# Patient Record
Sex: MALE | Race: WHITE | NOT HISPANIC OR LATINO
[De-identification: names, ages, dates, MRNs, and addresses within clinical notes are randomized per-mention and may not be internally consistent; named-entity substitution may affect disease eponyms.]

---

## 2017-07-13 ENCOUNTER — APPOINTMENT (OUTPATIENT)
Dept: UROLOGY | Facility: CLINIC | Age: 62
End: 2017-07-13

## 2017-07-13 DIAGNOSIS — Z87.09 PERSONAL HISTORY OF OTHER DISEASES OF THE RESPIRATORY SYSTEM: ICD-10-CM

## 2017-07-13 DIAGNOSIS — Z87.19 PERSONAL HISTORY OF OTHER DISEASES OF THE DIGESTIVE SYSTEM: ICD-10-CM

## 2017-07-13 DIAGNOSIS — R97.20 ELEVATED PROSTATE, SPECIFIC ANTIGEN [PSA]: ICD-10-CM

## 2017-08-02 ENCOUNTER — FORM ENCOUNTER (OUTPATIENT)
Age: 62
End: 2017-08-02

## 2017-08-03 ENCOUNTER — OUTPATIENT (OUTPATIENT)
Dept: OUTPATIENT SERVICES | Facility: HOSPITAL | Age: 62
LOS: 1 days | End: 2017-08-03
Payer: COMMERCIAL

## 2017-08-03 ENCOUNTER — APPOINTMENT (OUTPATIENT)
Dept: MRI IMAGING | Facility: IMAGING CENTER | Age: 62
End: 2017-08-03
Payer: COMMERCIAL

## 2017-08-03 DIAGNOSIS — R97.20 ELEVATED PROSTATE SPECIFIC ANTIGEN [PSA]: ICD-10-CM

## 2017-08-03 PROCEDURE — 72197 MRI PELVIS W/O & W/DYE: CPT

## 2017-08-03 PROCEDURE — 82565 ASSAY OF CREATININE: CPT

## 2017-08-03 PROCEDURE — A9585: CPT

## 2017-08-03 PROCEDURE — 72197 MRI PELVIS W/O & W/DYE: CPT | Mod: 26

## 2017-08-15 ENCOUNTER — APPOINTMENT (OUTPATIENT)
Dept: UROLOGY | Facility: CLINIC | Age: 62
End: 2017-08-15
Payer: COMMERCIAL

## 2017-08-15 VITALS — SYSTOLIC BLOOD PRESSURE: 124 MMHG | HEART RATE: 82 BPM | DIASTOLIC BLOOD PRESSURE: 86 MMHG | TEMPERATURE: 98.7 F

## 2017-08-15 PROCEDURE — 99214 OFFICE O/P EST MOD 30 MIN: CPT

## 2017-08-18 ENCOUNTER — TRANSCRIPTION ENCOUNTER (OUTPATIENT)
Age: 62
End: 2017-08-18

## 2017-08-20 LAB — PSA SERPL-MCNC: 30.84 NG/ML

## 2017-09-05 ENCOUNTER — APPOINTMENT (OUTPATIENT)
Dept: UROLOGY | Facility: CLINIC | Age: 62
End: 2017-09-05
Payer: COMMERCIAL

## 2017-09-05 ENCOUNTER — RECORD ABSTRACTING (OUTPATIENT)
Age: 62
End: 2017-09-05

## 2017-09-05 ENCOUNTER — OUTPATIENT (OUTPATIENT)
Dept: OUTPATIENT SERVICES | Facility: HOSPITAL | Age: 62
LOS: 1 days | End: 2017-09-05
Payer: COMMERCIAL

## 2017-09-05 VITALS
TEMPERATURE: 98.4 F | HEART RATE: 67 BPM | SYSTOLIC BLOOD PRESSURE: 128 MMHG | RESPIRATION RATE: 16 BRPM | DIASTOLIC BLOOD PRESSURE: 78 MMHG

## 2017-09-05 DIAGNOSIS — R35.0 FREQUENCY OF MICTURITION: ICD-10-CM

## 2017-09-05 PROCEDURE — 76942 ECHO GUIDE FOR BIOPSY: CPT | Mod: 26,59

## 2017-09-05 PROCEDURE — 76872 US TRANSRECTAL: CPT

## 2017-09-05 PROCEDURE — 55700: CPT

## 2017-09-05 PROCEDURE — 76872 US TRANSRECTAL: CPT | Mod: 26

## 2017-09-05 PROCEDURE — 76942 ECHO GUIDE FOR BIOPSY: CPT | Mod: 59

## 2017-09-05 RX ORDER — AMIKACIN SULFATE 250 MG/ML
500 INJECTION, SOLUTION INTRAMUSCULAR; INTRAVENOUS
Refills: 0 | Status: COMPLETED | OUTPATIENT
Start: 2017-09-05

## 2017-09-05 RX ORDER — AMIKACIN SULFATE 250 MG/ML
500 INJECTION, SOLUTION INTRAMUSCULAR; INTRAVENOUS
Qty: 1 | Refills: 0 | Status: COMPLETED | OUTPATIENT
Start: 2017-09-05 | End: 2017-09-05

## 2017-09-05 RX ADMIN — AMIKACIN SULFATE 2 MG/2ML: 250 INJECTION, SOLUTION INTRAMUSCULAR; INTRAVENOUS at 00:00

## 2017-09-08 LAB — CORE LAB BIOPSY: NORMAL

## 2017-09-11 DIAGNOSIS — R97.20 ELEVATED PROSTATE SPECIFIC ANTIGEN [PSA]: ICD-10-CM

## 2017-09-19 ENCOUNTER — APPOINTMENT (OUTPATIENT)
Dept: UROLOGY | Facility: CLINIC | Age: 62
End: 2017-09-19

## 2017-09-26 ENCOUNTER — APPOINTMENT (OUTPATIENT)
Dept: UROLOGY | Facility: CLINIC | Age: 62
End: 2017-09-26
Payer: COMMERCIAL

## 2017-09-26 PROCEDURE — 99214 OFFICE O/P EST MOD 30 MIN: CPT

## 2017-10-03 ENCOUNTER — FORM ENCOUNTER (OUTPATIENT)
Age: 62
End: 2017-10-03

## 2017-10-04 ENCOUNTER — APPOINTMENT (OUTPATIENT)
Dept: NUCLEAR MEDICINE | Facility: IMAGING CENTER | Age: 62
End: 2017-10-04
Payer: COMMERCIAL

## 2017-10-04 ENCOUNTER — OUTPATIENT (OUTPATIENT)
Dept: OUTPATIENT SERVICES | Facility: HOSPITAL | Age: 62
LOS: 1 days | End: 2017-10-04
Payer: COMMERCIAL

## 2017-10-04 ENCOUNTER — APPOINTMENT (OUTPATIENT)
Dept: CT IMAGING | Facility: IMAGING CENTER | Age: 62
End: 2017-10-04
Payer: COMMERCIAL

## 2017-10-04 DIAGNOSIS — C61 MALIGNANT NEOPLASM OF PROSTATE: ICD-10-CM

## 2017-10-04 PROCEDURE — 78306 BONE IMAGING WHOLE BODY: CPT | Mod: 26

## 2017-10-04 PROCEDURE — 78320: CPT | Mod: 26

## 2017-10-04 PROCEDURE — 74177 CT ABD & PELVIS W/CONTRAST: CPT

## 2017-10-04 PROCEDURE — 82565 ASSAY OF CREATININE: CPT

## 2017-10-04 PROCEDURE — 78999 UNLISTED MISC PX DX NUC MED: CPT

## 2017-10-04 PROCEDURE — 74177 CT ABD & PELVIS W/CONTRAST: CPT | Mod: 26

## 2017-10-04 PROCEDURE — A9561: CPT

## 2017-10-04 PROCEDURE — 78306 BONE IMAGING WHOLE BODY: CPT

## 2017-10-11 ENCOUNTER — RECORD ABSTRACTING (OUTPATIENT)
Age: 62
End: 2017-10-11

## 2017-10-19 ENCOUNTER — OUTPATIENT (OUTPATIENT)
Dept: OUTPATIENT SERVICES | Facility: HOSPITAL | Age: 62
LOS: 1 days | End: 2017-10-19
Payer: COMMERCIAL

## 2017-10-19 VITALS
DIASTOLIC BLOOD PRESSURE: 98 MMHG | HEART RATE: 88 BPM | SYSTOLIC BLOOD PRESSURE: 134 MMHG | HEIGHT: 71 IN | TEMPERATURE: 98 F | WEIGHT: 197.09 LBS | RESPIRATION RATE: 18 BRPM

## 2017-10-19 DIAGNOSIS — H91.90 UNSPECIFIED HEARING LOSS, UNSPECIFIED EAR: ICD-10-CM

## 2017-10-19 DIAGNOSIS — K40.90 UNILATERAL INGUINAL HERNIA, WITHOUT OBSTRUCTION OR GANGRENE, NOT SPECIFIED AS RECURRENT: Chronic | ICD-10-CM

## 2017-10-19 DIAGNOSIS — C61 MALIGNANT NEOPLASM OF PROSTATE: ICD-10-CM

## 2017-10-19 DIAGNOSIS — K21.9 GASTRO-ESOPHAGEAL REFLUX DISEASE WITHOUT ESOPHAGITIS: ICD-10-CM

## 2017-10-19 DIAGNOSIS — I10 ESSENTIAL (PRIMARY) HYPERTENSION: ICD-10-CM

## 2017-10-19 DIAGNOSIS — G47.33 OBSTRUCTIVE SLEEP APNEA (ADULT) (PEDIATRIC): ICD-10-CM

## 2017-10-19 LAB
ALBUMIN SERPL ELPH-MCNC: 4.3 G/DL — SIGNIFICANT CHANGE UP (ref 3.3–5)
ALP SERPL-CCNC: 60 U/L — SIGNIFICANT CHANGE UP (ref 40–120)
ALT FLD-CCNC: 19 U/L — SIGNIFICANT CHANGE UP (ref 4–41)
APPEARANCE UR: CLEAR — SIGNIFICANT CHANGE UP
AST SERPL-CCNC: 21 U/L — SIGNIFICANT CHANGE UP (ref 4–40)
BILIRUB SERPL-MCNC: 0.3 MG/DL — SIGNIFICANT CHANGE UP (ref 0.2–1.2)
BILIRUB UR-MCNC: NEGATIVE — SIGNIFICANT CHANGE UP
BLOOD UR QL VISUAL: NEGATIVE — SIGNIFICANT CHANGE UP
BUN SERPL-MCNC: 20 MG/DL — SIGNIFICANT CHANGE UP (ref 7–23)
CALCIUM SERPL-MCNC: 9.4 MG/DL — SIGNIFICANT CHANGE UP (ref 8.4–10.5)
CHLORIDE SERPL-SCNC: 101 MMOL/L — SIGNIFICANT CHANGE UP (ref 98–107)
CO2 SERPL-SCNC: 28 MMOL/L — SIGNIFICANT CHANGE UP (ref 22–31)
COLOR SPEC: SIGNIFICANT CHANGE UP
CREAT SERPL-MCNC: 1.32 MG/DL — HIGH (ref 0.5–1.3)
GLUCOSE SERPL-MCNC: 76 MG/DL — SIGNIFICANT CHANGE UP (ref 70–99)
GLUCOSE UR-MCNC: NEGATIVE — SIGNIFICANT CHANGE UP
HCT VFR BLD CALC: 45.8 % — SIGNIFICANT CHANGE UP (ref 39–50)
HGB BLD-MCNC: 14.4 G/DL — SIGNIFICANT CHANGE UP (ref 13–17)
KETONES UR-MCNC: NEGATIVE — SIGNIFICANT CHANGE UP
LEUKOCYTE ESTERASE UR-ACNC: NEGATIVE — SIGNIFICANT CHANGE UP
MCHC RBC-ENTMCNC: 30.3 PG — SIGNIFICANT CHANGE UP (ref 27–34)
MCHC RBC-ENTMCNC: 31.4 % — LOW (ref 32–36)
MCV RBC AUTO: 96.2 FL — SIGNIFICANT CHANGE UP (ref 80–100)
MUCOUS THREADS # UR AUTO: SIGNIFICANT CHANGE UP
NITRITE UR-MCNC: NEGATIVE — SIGNIFICANT CHANGE UP
NRBC # FLD: 0 — SIGNIFICANT CHANGE UP
PH UR: 6 — SIGNIFICANT CHANGE UP (ref 4.6–8)
PLATELET # BLD AUTO: 204 K/UL — SIGNIFICANT CHANGE UP (ref 150–400)
PMV BLD: 12.2 FL — SIGNIFICANT CHANGE UP (ref 7–13)
POTASSIUM SERPL-MCNC: 4 MMOL/L — SIGNIFICANT CHANGE UP (ref 3.5–5.3)
POTASSIUM SERPL-SCNC: 4 MMOL/L — SIGNIFICANT CHANGE UP (ref 3.5–5.3)
PROT SERPL-MCNC: 7.5 G/DL — SIGNIFICANT CHANGE UP (ref 6–8.3)
PROT UR-MCNC: 20 — SIGNIFICANT CHANGE UP
RBC # BLD: 4.76 M/UL — SIGNIFICANT CHANGE UP (ref 4.2–5.8)
RBC # FLD: 12.4 % — SIGNIFICANT CHANGE UP (ref 10.3–14.5)
SODIUM SERPL-SCNC: 144 MMOL/L — SIGNIFICANT CHANGE UP (ref 135–145)
SP GR SPEC: 1.01 — SIGNIFICANT CHANGE UP (ref 1–1.03)
UROBILINOGEN FLD QL: NORMAL E.U. — SIGNIFICANT CHANGE UP (ref 0.1–0.2)
WBC # BLD: 5.1 K/UL — SIGNIFICANT CHANGE UP (ref 3.8–10.5)
WBC # FLD AUTO: 5.1 K/UL — SIGNIFICANT CHANGE UP (ref 3.8–10.5)
WBC UR QL: SIGNIFICANT CHANGE UP (ref 0–?)

## 2017-10-19 PROCEDURE — 93010 ELECTROCARDIOGRAM REPORT: CPT

## 2017-10-19 RX ORDER — SODIUM CHLORIDE 9 MG/ML
1000 INJECTION, SOLUTION INTRAVENOUS
Refills: 0 | Status: DISCONTINUED | OUTPATIENT
Start: 2017-10-30 | End: 2017-10-30

## 2017-10-19 NOTE — H&P PST ADULT - NEGATIVE ALLERGY TYPES
no reactions to food/no outdoor environmental allergies/no indoor environmental allergies/no reactions to medicines

## 2017-10-19 NOTE — H&P PST ADULT - NEGATIVE ENMT SYMPTOMS
no nasal congestion/no nasal obstruction/no post-nasal discharge/no tinnitus/no nasal discharge/no vertigo/no sinus symptoms/no ear pain/no throat pain/no dysphagia

## 2017-10-19 NOTE — H&P PST ADULT - FAMILY HISTORY
Mother  Still living? No  Family history of breast cancer, Age at diagnosis: Age Unknown     Father  Still living? No  Family history of stroke, Age at diagnosis: Age Unknown

## 2017-10-19 NOTE — H&P PST ADULT - ENMT COMMENTS
had an ear infection, treated with wrong med prefers use of , deaf right ear, 50% left ear - pt states, from using wrong medication for ear infection when he was a baby

## 2017-10-19 NOTE — H&P PST ADULT - PMH
Gastritis    GERD (gastroesophageal reflux disease)    Hyperlipidemia    Hypertension Gastritis    GERD (gastroesophageal reflux disease)    Hearing loss  deaf right ear, 50 % loss left ear  Hyperlipidemia    Hypertension

## 2017-10-19 NOTE — H&P PST ADULT - PROBLEM SELECTOR PLAN 1
Scheduled for robotic radical prostatectomy on 10/30/2017. labs done and results pending. Surgical scrub & preop instruction given and explained. Famotidine provided with instruction. Verbalized understanding.

## 2017-10-19 NOTE — H&P PST ADULT - HISTORY OF PRESENT ILLNESS
61 yo male with hx of HTN, HLD, GERD presents to have PST evaluation for robotic radical prostatectomy on 10/30/2017. Patient states, elevated PSA 61 yo male with hx of HTN, HLD, GERD presents to have PST evaluation for robotic radical prostatectomy on 10/30/2017. Patient c/o slowly rising PSA level over 7 years, with 4 negative biopsies, monitored by urologist, recent bx (9/2017) showed "malignancy", surgical intervention was recommended.    Patient is accompanied by .

## 2017-10-19 NOTE — H&P PST ADULT - ABILITY TO HEAR (WITH HEARING AID OR HEARING APPLIANCE IF NORMALLY USED):
Mildly to Moderately Impaired: difficulty hearing in some environments or speaker may need to increase volume or speak distinctly/prefers use of , deaf right ear, 50% left ear

## 2017-10-19 NOTE — H&P PST ADULT - NSANTHOSAYNRD_GEN_A_CORE
No. ANKUSH screening performed.  STOP BANG Legend: 0-2 = LOW Risk; 3-4 = INTERMEDIATE Risk; 5-8 = HIGH Risk

## 2017-10-20 LAB
BLD GP AB SCN SERPL QL: NEGATIVE — SIGNIFICANT CHANGE UP
RH IG SCN BLD-IMP: POSITIVE — SIGNIFICANT CHANGE UP

## 2017-10-21 LAB
BACTERIA UR CULT: SIGNIFICANT CHANGE UP
SPECIMEN SOURCE: SIGNIFICANT CHANGE UP

## 2017-10-22 ENCOUNTER — FORM ENCOUNTER (OUTPATIENT)
Age: 62
End: 2017-10-22

## 2017-10-23 ENCOUNTER — OUTPATIENT (OUTPATIENT)
Dept: OUTPATIENT SERVICES | Facility: HOSPITAL | Age: 62
LOS: 1 days | End: 2017-10-23
Payer: COMMERCIAL

## 2017-10-23 ENCOUNTER — APPOINTMENT (OUTPATIENT)
Dept: MRI IMAGING | Facility: IMAGING CENTER | Age: 62
End: 2017-10-23
Payer: COMMERCIAL

## 2017-10-23 DIAGNOSIS — K40.90 UNILATERAL INGUINAL HERNIA, WITHOUT OBSTRUCTION OR GANGRENE, NOT SPECIFIED AS RECURRENT: Chronic | ICD-10-CM

## 2017-10-23 DIAGNOSIS — C61 MALIGNANT NEOPLASM OF PROSTATE: ICD-10-CM

## 2017-10-23 PROCEDURE — 72195 MRI PELVIS W/O DYE: CPT | Mod: 26

## 2017-10-23 PROCEDURE — 72195 MRI PELVIS W/O DYE: CPT

## 2017-10-25 ENCOUNTER — LABORATORY RESULT (OUTPATIENT)
Age: 62
End: 2017-10-25

## 2017-10-26 ENCOUNTER — FORM ENCOUNTER (OUTPATIENT)
Age: 62
End: 2017-10-26

## 2017-10-26 LAB
INR PPP: 0.93 RATIO
PT BLD: 10.5 SEC

## 2017-10-27 ENCOUNTER — RESULT REVIEW (OUTPATIENT)
Age: 62
End: 2017-10-27

## 2017-10-27 ENCOUNTER — OUTPATIENT (OUTPATIENT)
Dept: OUTPATIENT SERVICES | Facility: HOSPITAL | Age: 62
LOS: 1 days | End: 2017-10-27
Payer: COMMERCIAL

## 2017-10-27 ENCOUNTER — APPOINTMENT (OUTPATIENT)
Dept: CT IMAGING | Facility: HOSPITAL | Age: 62
End: 2017-10-27
Payer: COMMERCIAL

## 2017-10-27 DIAGNOSIS — M89.9 DISORDER OF BONE, UNSPECIFIED: ICD-10-CM

## 2017-10-27 DIAGNOSIS — K40.90 UNILATERAL INGUINAL HERNIA, WITHOUT OBSTRUCTION OR GANGRENE, NOT SPECIFIED AS RECURRENT: Chronic | ICD-10-CM

## 2017-10-27 PROCEDURE — 20225 BONE BIOPSY TROCAR/NDL DEEP: CPT

## 2017-10-27 PROCEDURE — 77012 CT SCAN FOR NEEDLE BIOPSY: CPT

## 2017-10-27 PROCEDURE — 77012 CT SCAN FOR NEEDLE BIOPSY: CPT | Mod: 26

## 2017-10-27 PROCEDURE — 88305 TISSUE EXAM BY PATHOLOGIST: CPT

## 2017-10-27 PROCEDURE — 88305 TISSUE EXAM BY PATHOLOGIST: CPT | Mod: 26

## 2017-10-27 PROCEDURE — 88172 CYTP DX EVAL FNA 1ST EA SITE: CPT

## 2017-10-27 PROCEDURE — 88173 CYTOPATH EVAL FNA REPORT: CPT | Mod: 26

## 2017-10-27 PROCEDURE — 88173 CYTOPATH EVAL FNA REPORT: CPT

## 2017-10-27 NOTE — ASU PATIENT PROFILE, ADULT - PMH
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

## 2017-10-30 ENCOUNTER — RESULT REVIEW (OUTPATIENT)
Age: 62
End: 2017-10-30

## 2017-10-30 ENCOUNTER — APPOINTMENT (OUTPATIENT)
Dept: UROLOGY | Facility: HOSPITAL | Age: 62
End: 2017-10-30

## 2017-10-30 ENCOUNTER — INPATIENT (INPATIENT)
Facility: HOSPITAL | Age: 62
LOS: 1 days | Discharge: HOME | End: 2017-11-01
Attending: UROLOGY | Admitting: UROLOGY
Payer: COMMERCIAL

## 2017-10-30 VITALS
HEIGHT: 71 IN | RESPIRATION RATE: 16 BRPM | SYSTOLIC BLOOD PRESSURE: 145 MMHG | OXYGEN SATURATION: 96 % | TEMPERATURE: 98 F | WEIGHT: 197.09 LBS | DIASTOLIC BLOOD PRESSURE: 96 MMHG | HEART RATE: 92 BPM

## 2017-10-30 DIAGNOSIS — C61 MALIGNANT NEOPLASM OF PROSTATE: ICD-10-CM

## 2017-10-30 DIAGNOSIS — K40.90 UNILATERAL INGUINAL HERNIA, WITHOUT OBSTRUCTION OR GANGRENE, NOT SPECIFIED AS RECURRENT: Chronic | ICD-10-CM

## 2017-10-30 LAB
NON-GYNECOLOGICAL CYTOLOGY STUDY: SIGNIFICANT CHANGE UP
RH IG SCN BLD-IMP: POSITIVE — SIGNIFICANT CHANGE UP

## 2017-10-30 PROCEDURE — 88309 TISSUE EXAM BY PATHOLOGIST: CPT | Mod: 26

## 2017-10-30 PROCEDURE — 88307 TISSUE EXAM BY PATHOLOGIST: CPT | Mod: 26

## 2017-10-30 PROCEDURE — 55866 LAPS SURG PRST8ECT RPBIC RAD: CPT

## 2017-10-30 PROCEDURE — 38571 LAPAROSCOPY LYMPHADENECTOMY: CPT

## 2017-10-30 RX ORDER — FAMOTIDINE 10 MG/ML
20 INJECTION INTRAVENOUS
Refills: 0 | Status: DISCONTINUED | OUTPATIENT
Start: 2017-10-30 | End: 2017-11-01

## 2017-10-30 RX ORDER — OXYCODONE AND ACETAMINOPHEN 5; 325 MG/1; MG/1
2 TABLET ORAL EVERY 6 HOURS
Refills: 0 | Status: DISCONTINUED | OUTPATIENT
Start: 2017-10-30 | End: 2017-10-31

## 2017-10-30 RX ORDER — HEPARIN SODIUM 5000 [USP'U]/ML
5000 INJECTION INTRAVENOUS; SUBCUTANEOUS EVERY 8 HOURS
Refills: 0 | Status: DISCONTINUED | OUTPATIENT
Start: 2017-10-30 | End: 2017-11-01

## 2017-10-30 RX ORDER — OXYBUTYNIN CHLORIDE 5 MG
5 TABLET ORAL ONCE
Refills: 0 | Status: COMPLETED | OUTPATIENT
Start: 2017-10-30 | End: 2017-10-30

## 2017-10-30 RX ORDER — VALSARTAN 80 MG/1
160 TABLET ORAL DAILY
Refills: 0 | Status: DISCONTINUED | OUTPATIENT
Start: 2017-10-30 | End: 2017-11-01

## 2017-10-30 RX ORDER — ATORVASTATIN CALCIUM 80 MG/1
10 TABLET, FILM COATED ORAL AT BEDTIME
Refills: 0 | Status: DISCONTINUED | OUTPATIENT
Start: 2017-10-30 | End: 2017-11-01

## 2017-10-30 RX ORDER — MORPHINE SULFATE 50 MG/1
2 CAPSULE, EXTENDED RELEASE ORAL EVERY 6 HOURS
Refills: 0 | Status: DISCONTINUED | OUTPATIENT
Start: 2017-10-30 | End: 2017-10-31

## 2017-10-30 RX ORDER — SENNA PLUS 8.6 MG/1
2 TABLET ORAL AT BEDTIME
Refills: 0 | Status: DISCONTINUED | OUTPATIENT
Start: 2017-10-30 | End: 2017-11-01

## 2017-10-30 RX ORDER — HYDROCHLOROTHIAZIDE 25 MG
12.5 TABLET ORAL DAILY
Refills: 0 | Status: DISCONTINUED | OUTPATIENT
Start: 2017-10-30 | End: 2017-11-01

## 2017-10-30 RX ORDER — DOCUSATE SODIUM 100 MG
100 CAPSULE ORAL THREE TIMES A DAY
Refills: 0 | Status: DISCONTINUED | OUTPATIENT
Start: 2017-10-30 | End: 2017-11-01

## 2017-10-30 RX ORDER — HYDROMORPHONE HYDROCHLORIDE 2 MG/ML
0.5 INJECTION INTRAMUSCULAR; INTRAVENOUS; SUBCUTANEOUS
Refills: 0 | Status: DISCONTINUED | OUTPATIENT
Start: 2017-10-30 | End: 2017-10-30

## 2017-10-30 RX ORDER — SODIUM CHLORIDE 9 MG/ML
1000 INJECTION, SOLUTION INTRAVENOUS
Refills: 0 | Status: DISCONTINUED | OUTPATIENT
Start: 2017-10-30 | End: 2017-10-31

## 2017-10-30 RX ORDER — OXYCODONE AND ACETAMINOPHEN 5; 325 MG/1; MG/1
1 TABLET ORAL EVERY 4 HOURS
Refills: 0 | Status: DISCONTINUED | OUTPATIENT
Start: 2017-10-30 | End: 2017-11-01

## 2017-10-30 RX ORDER — ONDANSETRON 8 MG/1
4 TABLET, FILM COATED ORAL ONCE
Refills: 0 | Status: DISCONTINUED | OUTPATIENT
Start: 2017-10-30 | End: 2017-10-30

## 2017-10-30 RX ADMIN — HYDROMORPHONE HYDROCHLORIDE 0.5 MILLIGRAM(S): 2 INJECTION INTRAMUSCULAR; INTRAVENOUS; SUBCUTANEOUS at 15:20

## 2017-10-30 RX ADMIN — ATORVASTATIN CALCIUM 10 MILLIGRAM(S): 80 TABLET, FILM COATED ORAL at 21:14

## 2017-10-30 RX ADMIN — SENNA PLUS 2 TABLET(S): 8.6 TABLET ORAL at 21:14

## 2017-10-30 RX ADMIN — HYDROMORPHONE HYDROCHLORIDE 0.5 MILLIGRAM(S): 2 INJECTION INTRAMUSCULAR; INTRAVENOUS; SUBCUTANEOUS at 15:05

## 2017-10-30 RX ADMIN — Medication 100 MILLIGRAM(S): at 21:14

## 2017-10-30 RX ADMIN — Medication 5 MILLIGRAM(S): at 16:27

## 2017-10-30 RX ADMIN — FAMOTIDINE 20 MILLIGRAM(S): 10 INJECTION INTRAVENOUS at 19:53

## 2017-10-30 RX ADMIN — HYDROMORPHONE HYDROCHLORIDE 0.5 MILLIGRAM(S): 2 INJECTION INTRAMUSCULAR; INTRAVENOUS; SUBCUTANEOUS at 14:50

## 2017-10-30 RX ADMIN — HEPARIN SODIUM 5000 UNIT(S): 5000 INJECTION INTRAVENOUS; SUBCUTANEOUS at 19:53

## 2017-10-30 NOTE — PROGRESS NOTE ADULT - ASSESSMENT
63 yo M s/p RALP with post-op pain likely bladder spasms, otherwise appears well    Strict I&O's  Analgesia Antiemetics  DVT prophylaxis  Incentive spirometry  Clears / OOB  AM labs  Keep monroe until outpatient visit    Walt Yuan, PGY-1  Urology, pager #[phone]

## 2017-10-30 NOTE — PROGRESS NOTE ADULT - SUBJECTIVE AND OBJECTIVE BOX
Note    Post op Check    s/p : RALP, JOANA drain and monroe in place    Pt seen / examined, complaining of suprapubic pain unrelieved by 2mg dilaudid. After irrigating catheter to exclude retention 5mg ditropan ordered for bladder spasms. Monroe secured in stat-lock    Vital Signs Last 24 Hrs  T(C): 36.5 (30 Oct 2017 14:30), Max: 36.5 (30 Oct 2017 09:43)  T(F): 97.7 (30 Oct 2017 14:30), Max: 97.7 (30 Oct 2017 09:43)  HR: 69 (30 Oct 2017 16:15) (66 - 92)  BP: 137/73 (30 Oct 2017 16:15) (122/67 - 158/87)  BP(mean): --  RR: 98 (30 Oct 2017 16:15) (10 - 98)  SpO2: 98% (30 Oct 2017 16:15) (95% - 98%)    I&O's Summary    30 Oct 2017 07:01  -  30 Oct 2017 16:47  --------------------------------------------------------  IN: 215 mL / OUT: 225 mL / NET: -10 mL        PHYSICAL EXAM:       Constitutional: Gen: appears in pain    Respiratory: normal rate and work of breathing    Cardiovascular: normal rate, RR    Gastrointestinal: Soft, tender suprapubic, ice pack in place. JOANA SS    Genitourinary: Monroe in place, secured with stat-lock, draining yellow urine    Extremities: pink, warm

## 2017-10-30 NOTE — BRIEF OPERATIVE NOTE - PROCEDURE
<<-----Click on this checkbox to enter Procedure Prostatectomy, laparoscopic, using da Adarsh Si system  10/30/2017    Active  PKHANDGE

## 2017-10-31 LAB
BASOPHILS # BLD AUTO: 0.01 K/UL — SIGNIFICANT CHANGE UP (ref 0–0.2)
BASOPHILS NFR BLD AUTO: 0.1 % — SIGNIFICANT CHANGE UP (ref 0–2)
BUN SERPL-MCNC: 12 MG/DL — SIGNIFICANT CHANGE UP (ref 7–23)
CALCIUM SERPL-MCNC: 8.3 MG/DL — LOW (ref 8.4–10.5)
CHLORIDE SERPL-SCNC: 101 MMOL/L — SIGNIFICANT CHANGE UP (ref 98–107)
CO2 SERPL-SCNC: 26 MMOL/L — SIGNIFICANT CHANGE UP (ref 22–31)
CREAT SERPL-MCNC: 1.07 MG/DL — SIGNIFICANT CHANGE UP (ref 0.5–1.3)
EOSINOPHIL # BLD AUTO: 0.01 K/UL — SIGNIFICANT CHANGE UP (ref 0–0.5)
EOSINOPHIL NFR BLD AUTO: 0.1 % — SIGNIFICANT CHANGE UP (ref 0–6)
GLUCOSE SERPL-MCNC: 87 MG/DL — SIGNIFICANT CHANGE UP (ref 70–99)
HCT VFR BLD CALC: 39.1 % — SIGNIFICANT CHANGE UP (ref 39–50)
HGB BLD-MCNC: 12.5 G/DL — LOW (ref 13–17)
IMM GRANULOCYTES # BLD AUTO: 0.03 # — SIGNIFICANT CHANGE UP
IMM GRANULOCYTES NFR BLD AUTO: 0.4 % — SIGNIFICANT CHANGE UP (ref 0–1.5)
LYMPHOCYTES # BLD AUTO: 1.12 K/UL — SIGNIFICANT CHANGE UP (ref 1–3.3)
LYMPHOCYTES # BLD AUTO: 15.7 % — SIGNIFICANT CHANGE UP (ref 13–44)
MCHC RBC-ENTMCNC: 30.6 PG — SIGNIFICANT CHANGE UP (ref 27–34)
MCHC RBC-ENTMCNC: 32 % — SIGNIFICANT CHANGE UP (ref 32–36)
MCV RBC AUTO: 95.8 FL — SIGNIFICANT CHANGE UP (ref 80–100)
MONOCYTES # BLD AUTO: 0.74 K/UL — SIGNIFICANT CHANGE UP (ref 0–0.9)
MONOCYTES NFR BLD AUTO: 10.4 % — SIGNIFICANT CHANGE UP (ref 2–14)
NEUTROPHILS # BLD AUTO: 5.23 K/UL — SIGNIFICANT CHANGE UP (ref 1.8–7.4)
NEUTROPHILS NFR BLD AUTO: 73.3 % — SIGNIFICANT CHANGE UP (ref 43–77)
NRBC # FLD: 0 — SIGNIFICANT CHANGE UP
PLATELET # BLD AUTO: 168 K/UL — SIGNIFICANT CHANGE UP (ref 150–400)
PMV BLD: 12.8 FL — SIGNIFICANT CHANGE UP (ref 7–13)
POTASSIUM SERPL-MCNC: 3.6 MMOL/L — SIGNIFICANT CHANGE UP (ref 3.5–5.3)
POTASSIUM SERPL-SCNC: 3.6 MMOL/L — SIGNIFICANT CHANGE UP (ref 3.5–5.3)
RBC # BLD: 4.08 M/UL — LOW (ref 4.2–5.8)
RBC # FLD: 12.2 % — SIGNIFICANT CHANGE UP (ref 10.3–14.5)
SODIUM SERPL-SCNC: 139 MMOL/L — SIGNIFICANT CHANGE UP (ref 135–145)
WBC # BLD: 7.14 K/UL — SIGNIFICANT CHANGE UP (ref 3.8–10.5)
WBC # FLD AUTO: 7.14 K/UL — SIGNIFICANT CHANGE UP (ref 3.8–10.5)

## 2017-10-31 RX ORDER — HYDROMORPHONE HYDROCHLORIDE 2 MG/ML
1 INJECTION INTRAMUSCULAR; INTRAVENOUS; SUBCUTANEOUS
Refills: 0 | Status: DISCONTINUED | OUTPATIENT
Start: 2017-10-31 | End: 2017-11-01

## 2017-10-31 RX ORDER — OXYCODONE AND ACETAMINOPHEN 5; 325 MG/1; MG/1
2 TABLET ORAL EVERY 4 HOURS
Refills: 0 | Status: DISCONTINUED | OUTPATIENT
Start: 2017-10-31 | End: 2017-11-01

## 2017-10-31 RX ORDER — SODIUM CHLORIDE 9 MG/ML
1000 INJECTION, SOLUTION INTRAVENOUS
Refills: 0 | Status: DISCONTINUED | OUTPATIENT
Start: 2017-10-31 | End: 2017-11-01

## 2017-10-31 RX ADMIN — OXYCODONE AND ACETAMINOPHEN 2 TABLET(S): 5; 325 TABLET ORAL at 10:19

## 2017-10-31 RX ADMIN — SENNA PLUS 2 TABLET(S): 8.6 TABLET ORAL at 21:42

## 2017-10-31 RX ADMIN — OXYCODONE AND ACETAMINOPHEN 2 TABLET(S): 5; 325 TABLET ORAL at 23:53

## 2017-10-31 RX ADMIN — FAMOTIDINE 20 MILLIGRAM(S): 10 INJECTION INTRAVENOUS at 17:55

## 2017-10-31 RX ADMIN — HEPARIN SODIUM 5000 UNIT(S): 5000 INJECTION INTRAVENOUS; SUBCUTANEOUS at 21:42

## 2017-10-31 RX ADMIN — Medication 12.5 MILLIGRAM(S): at 05:47

## 2017-10-31 RX ADMIN — SODIUM CHLORIDE 75 MILLILITER(S): 9 INJECTION, SOLUTION INTRAVENOUS at 21:42

## 2017-10-31 RX ADMIN — VALSARTAN 160 MILLIGRAM(S): 80 TABLET ORAL at 07:28

## 2017-10-31 RX ADMIN — OXYCODONE AND ACETAMINOPHEN 2 TABLET(S): 5; 325 TABLET ORAL at 16:11

## 2017-10-31 RX ADMIN — OXYCODONE AND ACETAMINOPHEN 2 TABLET(S): 5; 325 TABLET ORAL at 04:03

## 2017-10-31 RX ADMIN — ATORVASTATIN CALCIUM 10 MILLIGRAM(S): 80 TABLET, FILM COATED ORAL at 21:42

## 2017-10-31 RX ADMIN — FAMOTIDINE 20 MILLIGRAM(S): 10 INJECTION INTRAVENOUS at 05:47

## 2017-10-31 RX ADMIN — OXYCODONE AND ACETAMINOPHEN 2 TABLET(S): 5; 325 TABLET ORAL at 09:47

## 2017-10-31 RX ADMIN — Medication 100 MILLIGRAM(S): at 13:05

## 2017-10-31 RX ADMIN — Medication 100 MILLIGRAM(S): at 05:47

## 2017-10-31 RX ADMIN — HEPARIN SODIUM 5000 UNIT(S): 5000 INJECTION INTRAVENOUS; SUBCUTANEOUS at 04:04

## 2017-10-31 RX ADMIN — OXYCODONE AND ACETAMINOPHEN 2 TABLET(S): 5; 325 TABLET ORAL at 16:52

## 2017-10-31 RX ADMIN — HEPARIN SODIUM 5000 UNIT(S): 5000 INJECTION INTRAVENOUS; SUBCUTANEOUS at 13:02

## 2017-10-31 RX ADMIN — Medication 100 MILLIGRAM(S): at 21:42

## 2017-10-31 NOTE — PROGRESS NOTE ADULT - SUBJECTIVE AND OBJECTIVE BOX
POD #1    Afeb 126/70 91 96%RA    Pt has no c/o; is able to read lips; hears enough to answer questions    Abd- soft NT ND; no flatus      wounds C&D    Alex 500  JOANA 25

## 2017-10-31 NOTE — PROGRESS NOTE ADULT - ASSESSMENT
61 yo M s/p RALP    Strict I&O's  Analgesia Antiemetics  DVT prophylaxis  Incentive spirometry  Clears / OOB  AM labs  Keep monroe until outpatient visit    Walt Yuan, PGY-1  Urology, pager #[phone] 61 yo M s/p VISHAL

## 2017-11-01 ENCOUNTER — TRANSCRIPTION ENCOUNTER (OUTPATIENT)
Age: 62
End: 2017-11-01

## 2017-11-01 VITALS
TEMPERATURE: 99 F | OXYGEN SATURATION: 97 % | DIASTOLIC BLOOD PRESSURE: 80 MMHG | HEART RATE: 106 BPM | RESPIRATION RATE: 16 BRPM | SYSTOLIC BLOOD PRESSURE: 141 MMHG

## 2017-11-01 LAB
BUN SERPL-MCNC: 9 MG/DL — SIGNIFICANT CHANGE UP (ref 7–23)
CALCIUM SERPL-MCNC: 8.5 MG/DL — SIGNIFICANT CHANGE UP (ref 8.4–10.5)
CHLORIDE SERPL-SCNC: 104 MMOL/L — SIGNIFICANT CHANGE UP (ref 98–107)
CO2 SERPL-SCNC: 29 MMOL/L — SIGNIFICANT CHANGE UP (ref 22–31)
CREAT FLD-MCNC: 1.07 MG/DL — SIGNIFICANT CHANGE UP
CREAT SERPL-MCNC: 1.01 MG/DL — SIGNIFICANT CHANGE UP (ref 0.5–1.3)
GLUCOSE SERPL-MCNC: 100 MG/DL — HIGH (ref 70–99)
POTASSIUM SERPL-MCNC: 3.9 MMOL/L — SIGNIFICANT CHANGE UP (ref 3.5–5.3)
POTASSIUM SERPL-SCNC: 3.9 MMOL/L — SIGNIFICANT CHANGE UP (ref 3.5–5.3)
SODIUM SERPL-SCNC: 143 MMOL/L — SIGNIFICANT CHANGE UP (ref 135–145)

## 2017-11-01 RX ORDER — INFLUENZA VIRUS VACCINE 15; 15; 15; 15 UG/.5ML; UG/.5ML; UG/.5ML; UG/.5ML
0.5 SUSPENSION INTRAMUSCULAR ONCE
Refills: 0 | Status: COMPLETED | OUTPATIENT
Start: 2017-11-01 | End: 2017-11-01

## 2017-11-01 RX ADMIN — Medication 12.5 MILLIGRAM(S): at 06:18

## 2017-11-01 RX ADMIN — VALSARTAN 160 MILLIGRAM(S): 80 TABLET ORAL at 06:18

## 2017-11-01 RX ADMIN — FAMOTIDINE 20 MILLIGRAM(S): 10 INJECTION INTRAVENOUS at 06:18

## 2017-11-01 RX ADMIN — HEPARIN SODIUM 5000 UNIT(S): 5000 INJECTION INTRAVENOUS; SUBCUTANEOUS at 15:00

## 2017-11-01 RX ADMIN — HEPARIN SODIUM 5000 UNIT(S): 5000 INJECTION INTRAVENOUS; SUBCUTANEOUS at 06:17

## 2017-11-01 RX ADMIN — OXYCODONE AND ACETAMINOPHEN 2 TABLET(S): 5; 325 TABLET ORAL at 15:30

## 2017-11-01 RX ADMIN — Medication 100 MILLIGRAM(S): at 06:18

## 2017-11-01 RX ADMIN — OXYCODONE AND ACETAMINOPHEN 2 TABLET(S): 5; 325 TABLET ORAL at 14:59

## 2017-11-01 RX ADMIN — Medication 100 MILLIGRAM(S): at 15:00

## 2017-11-01 RX ADMIN — INFLUENZA VIRUS VACCINE 0.5 MILLILITER(S): 15; 15; 15; 15 SUSPENSION INTRAMUSCULAR at 17:15

## 2017-11-01 RX ADMIN — OXYCODONE AND ACETAMINOPHEN 2 TABLET(S): 5; 325 TABLET ORAL at 10:01

## 2017-11-01 RX ADMIN — OXYCODONE AND ACETAMINOPHEN 2 TABLET(S): 5; 325 TABLET ORAL at 00:25

## 2017-11-01 RX ADMIN — OXYCODONE AND ACETAMINOPHEN 2 TABLET(S): 5; 325 TABLET ORAL at 09:31

## 2017-11-01 NOTE — DISCHARGE NOTE ADULT - MEDICATION SUMMARY - MEDICATIONS TO TAKE
I will START or STAY ON the medications listed below when I get home from the hospital:    oxyCODONE-acetaminophen 5 mg-325 mg oral tablet  -- 1-2 tab(s) by mouth every 6 hours, As Needed -Moderate Pain (4 - 6) MDD:8  -- Indication: For pain    atorvastatin 10 mg oral tablet  -- 1 tab(s) by mouth once a day am  -- Indication: For Home med    valsartan-hydrochlorothiazide 160mg-12.5mg oral tablet  -- 1 tab(s) by mouth once a day am  -- Indication: For Home med    famotidine 20 mg oral tablet  -- 1 tab(s) by mouth 2 times a day  -- Indication: For Home med

## 2017-11-01 NOTE — PROGRESS NOTE ADULT - SUBJECTIVE AND OBJECTIVE BOX
POD #2    Afeb 130/79 96  96%RA    Pt has no c/o    Abd- soft NT ND; no flatus     wounds C&D    Johnson 1600  JOANA 72    Has been OOB

## 2017-11-01 NOTE — DISCHARGE NOTE ADULT - CONDITIONS AT DISCHARGE
stable stable, abdominal steri strips MERYL clean dry and intact, monroe to leg bag drainage draing without difficulty, oob, tolerating diet stable, abdominal steri strips MERYL clean dry and intact, monroe to leg bag drainage draining without difficulty, oob, tolerating diet

## 2017-11-01 NOTE — DISCHARGE NOTE ADULT - PATIENT PORTAL LINK FT
“You can access the FollowHealth Patient Portal, offered by [facility], by registering with the following website: [email or web address]”

## 2017-11-01 NOTE — DISCHARGE NOTE ADULT - HOSPITAL COURSE
Pt had RALP; did well; OOB; tolerating clears; passed gas this AM and murphy reg diet; JOANA creat/serum OK; JOANA removed; home with monroe

## 2017-11-01 NOTE — DISCHARGE NOTE ADULT - PLAN OF CARE
prostatectomy as above; drink plenty of fluids; change dressing daily on drain site or as needed until dry then may remove; monroe care as instructed; call office for fever over 101; nausea/vomiting; avoid constipation while taking pain meds with stool softener/fiber

## 2017-11-01 NOTE — DISCHARGE NOTE ADULT - NS AS DC FOLLOWUP STROKE INST
carenotes on surgical procedure, d/c medications, monroe and leg bag care carenotes on surgical procedure, d/c medications, monroe and leg bag care/Influenza vaccination (VIS Pub Date: August 19, 2014)

## 2017-11-01 NOTE — DISCHARGE NOTE ADULT - INSTRUCTIONS
as tolerated Notify Dr High if you experience any increase in pain not relieved with pain medication, or any redness drainage or swelling around incision or if you develop a fever >100.5.  Drink plenty of fluids.  Use leg bag during the day and large bedside bag at night.  Notify Dr High of in bright red blood or clots are present in urine.  use over the counter stool softeners to assist with constipation which can be a side effect of your pain medication.

## 2017-11-01 NOTE — DISCHARGE NOTE ADULT - CARE PLAN
Principal Discharge DX:	Malignant neoplasm of prostate  Goal:	prostatectomy  Instructions for follow-up, activity and diet:	as above; drink plenty of fluids; change dressing daily on drain site or as needed until dry then may remove; monroe care as instructed; call office for fever over 101; nausea/vomiting; avoid constipation while taking pain meds with stool softener/fiber

## 2017-11-02 LAB — SURGICAL PATHOLOGY STUDY: SIGNIFICANT CHANGE UP

## 2017-11-07 ENCOUNTER — APPOINTMENT (OUTPATIENT)
Dept: UROLOGY | Facility: CLINIC | Age: 62
End: 2017-11-07

## 2017-11-08 RX ORDER — AMOXICILLIN AND CLAVULANATE POTASSIUM 875; 125 MG/1; MG/1
875-125 TABLET, COATED ORAL
Qty: 12 | Refills: 0 | Status: COMPLETED | OUTPATIENT
Start: 2017-08-15 | End: 2017-11-08

## 2017-11-10 ENCOUNTER — APPOINTMENT (OUTPATIENT)
Dept: UROLOGY | Facility: CLINIC | Age: 62
End: 2017-11-10
Payer: COMMERCIAL

## 2017-11-10 ENCOUNTER — TRANSCRIPTION ENCOUNTER (OUTPATIENT)
Age: 62
End: 2017-11-10

## 2017-11-10 PROCEDURE — 99024 POSTOP FOLLOW-UP VISIT: CPT

## 2017-11-20 ENCOUNTER — OUTPATIENT (OUTPATIENT)
Dept: OUTPATIENT SERVICES | Facility: HOSPITAL | Age: 62
LOS: 1 days | Discharge: HOME | End: 2017-11-20

## 2017-11-20 ENCOUNTER — APPOINTMENT (OUTPATIENT)
Dept: RADIATION ONCOLOGY | Facility: CLINIC | Age: 62
End: 2017-11-20
Payer: COMMERCIAL

## 2017-11-20 VITALS
RESPIRATION RATE: 16 BRPM | SYSTOLIC BLOOD PRESSURE: 142 MMHG | OXYGEN SATURATION: 98 % | TEMPERATURE: 98.1 F | DIASTOLIC BLOOD PRESSURE: 90 MMHG | HEIGHT: 71 IN | HEART RATE: 114 BPM | WEIGHT: 192.79 LBS | BODY MASS INDEX: 26.99 KG/M2

## 2017-11-20 DIAGNOSIS — K40.90 UNILATERAL INGUINAL HERNIA, WITHOUT OBSTRUCTION OR GANGRENE, NOT SPECIFIED AS RECURRENT: Chronic | ICD-10-CM

## 2017-11-20 PROCEDURE — 77263 THER RADIOLOGY TX PLNG CPLX: CPT

## 2017-11-20 PROCEDURE — 99205 OFFICE O/P NEW HI 60 MIN: CPT | Mod: 25

## 2017-12-05 PROCEDURE — 77470 SPECIAL RADIATION TREATMENT: CPT | Mod: 26

## 2017-12-06 LAB — PSA SERPL-MCNC: 1.69 NG/ML

## 2017-12-12 ENCOUNTER — OUTPATIENT (OUTPATIENT)
Dept: OUTPATIENT SERVICES | Facility: HOSPITAL | Age: 62
LOS: 1 days | End: 2017-12-12
Payer: COMMERCIAL

## 2017-12-12 ENCOUNTER — APPOINTMENT (OUTPATIENT)
Dept: UROLOGY | Facility: CLINIC | Age: 62
End: 2017-12-12
Payer: COMMERCIAL

## 2017-12-12 VITALS — HEART RATE: 79 BPM | DIASTOLIC BLOOD PRESSURE: 91 MMHG | SYSTOLIC BLOOD PRESSURE: 147 MMHG | TEMPERATURE: 97.5 F

## 2017-12-12 DIAGNOSIS — K40.90 UNILATERAL INGUINAL HERNIA, WITHOUT OBSTRUCTION OR GANGRENE, NOT SPECIFIED AS RECURRENT: Chronic | ICD-10-CM

## 2017-12-12 DIAGNOSIS — R35.0 FREQUENCY OF MICTURITION: ICD-10-CM

## 2017-12-12 PROCEDURE — 99024 POSTOP FOLLOW-UP VISIT: CPT

## 2017-12-12 PROCEDURE — 96402 CHEMO HORMON ANTINEOPL SQ/IM: CPT

## 2017-12-12 RX ORDER — LEUPROLIDE ACETATE 45 MG
45 KIT INTRAMUSCULAR
Qty: 1 | Refills: 0 | Status: COMPLETED | OUTPATIENT
Start: 2017-12-12 | End: 2017-12-12

## 2017-12-12 RX ORDER — LEUPROLIDE ACETATE 45 MG
45 KIT INTRAMUSCULAR
Qty: 1 | Refills: 0 | Status: COMPLETED | OUTPATIENT
Start: 2017-12-12

## 2017-12-12 RX ADMIN — LEUPROLIDE ACETATE 1 MG: KIT at 00:00

## 2017-12-18 DIAGNOSIS — C61 MALIGNANT NEOPLASM OF PROSTATE: ICD-10-CM

## 2018-01-08 PROCEDURE — 77300 RADIATION THERAPY DOSE PLAN: CPT | Mod: 26

## 2018-01-08 PROCEDURE — 77338 DESIGN MLC DEVICE FOR IMRT: CPT | Mod: 26

## 2018-01-08 PROCEDURE — 77301 RADIOTHERAPY DOSE PLAN IMRT: CPT | Mod: 26

## 2018-01-22 PROCEDURE — 77387B: CUSTOM | Mod: 26

## 2018-01-23 PROCEDURE — 77387B: CUSTOM | Mod: 26

## 2018-01-24 PROCEDURE — 77387B: CUSTOM | Mod: 26

## 2018-01-25 PROCEDURE — 77387B: CUSTOM | Mod: 26

## 2018-01-26 PROCEDURE — 77427 RADIATION TX MANAGEMENT X5: CPT

## 2018-01-26 PROCEDURE — 77387B: CUSTOM | Mod: 26

## 2018-01-29 PROCEDURE — 77387B: CUSTOM | Mod: 26

## 2018-01-30 PROCEDURE — 77387B: CUSTOM | Mod: 26

## 2018-01-31 PROCEDURE — 77387B: CUSTOM | Mod: 26

## 2018-02-01 PROCEDURE — 77387B: CUSTOM | Mod: 26

## 2018-02-02 PROCEDURE — 77387B: CUSTOM | Mod: 26

## 2018-02-02 PROCEDURE — 77427 RADIATION TX MANAGEMENT X5: CPT

## 2018-02-05 PROCEDURE — 77387B: CUSTOM | Mod: 26

## 2018-02-06 PROCEDURE — 77387B: CUSTOM | Mod: 26

## 2018-02-07 PROCEDURE — 77387B: CUSTOM | Mod: 26

## 2018-02-08 PROCEDURE — 77387B: CUSTOM | Mod: 26

## 2018-02-09 PROCEDURE — 77427 RADIATION TX MANAGEMENT X5: CPT

## 2018-02-09 PROCEDURE — 77387B: CUSTOM | Mod: 26

## 2018-02-12 PROCEDURE — 77387B: CUSTOM | Mod: 26

## 2018-02-13 PROCEDURE — 77387B: CUSTOM | Mod: 26

## 2018-02-14 PROCEDURE — 77387B: CUSTOM | Mod: 26

## 2018-02-15 PROCEDURE — 77387B: CUSTOM | Mod: 26

## 2018-02-16 PROCEDURE — 77387B: CUSTOM | Mod: 26

## 2018-02-16 PROCEDURE — 77427 RADIATION TX MANAGEMENT X5: CPT

## 2018-02-20 PROCEDURE — 77387B: CUSTOM | Mod: 26

## 2018-02-21 PROCEDURE — 77387B: CUSTOM | Mod: 26

## 2018-02-22 PROCEDURE — 77387B: CUSTOM | Mod: 26

## 2018-02-23 PROCEDURE — 77387B: CUSTOM | Mod: 26

## 2018-02-26 PROCEDURE — 77387B: CUSTOM | Mod: 26

## 2018-02-26 PROCEDURE — 77427 RADIATION TX MANAGEMENT X5: CPT

## 2018-02-27 PROCEDURE — 77387B: CUSTOM | Mod: 26

## 2018-02-28 PROCEDURE — 77387B: CUSTOM | Mod: 26

## 2018-03-01 PROCEDURE — 77387B: CUSTOM | Mod: 26

## 2018-03-02 PROCEDURE — 77387B: CUSTOM | Mod: 26

## 2018-03-05 PROCEDURE — 77387B: CUSTOM | Mod: 26

## 2018-03-05 PROCEDURE — 77427 RADIATION TX MANAGEMENT X5: CPT

## 2018-03-06 PROCEDURE — 77387B: CUSTOM | Mod: 26

## 2018-03-08 PROCEDURE — 77387B: CUSTOM | Mod: 26

## 2018-03-09 PROCEDURE — 77387B: CUSTOM | Mod: 26

## 2018-03-10 PROCEDURE — 77387B: CUSTOM | Mod: 26

## 2018-03-12 PROCEDURE — 77387B: CUSTOM | Mod: 26

## 2018-03-12 PROCEDURE — 77427 RADIATION TX MANAGEMENT X5: CPT

## 2018-03-13 PROCEDURE — 77387B: CUSTOM | Mod: 26

## 2018-03-14 PROCEDURE — 77387B: CUSTOM | Mod: 26

## 2018-03-15 PROCEDURE — 77387B: CUSTOM | Mod: 26

## 2018-03-16 PROCEDURE — 77387B: CUSTOM | Mod: 26

## 2018-04-19 ENCOUNTER — APPOINTMENT (OUTPATIENT)
Dept: RADIATION ONCOLOGY | Facility: CLINIC | Age: 63
End: 2018-04-19
Payer: COMMERCIAL

## 2018-04-19 VITALS
DIASTOLIC BLOOD PRESSURE: 85 MMHG | BODY MASS INDEX: 27.3 KG/M2 | HEIGHT: 71 IN | SYSTOLIC BLOOD PRESSURE: 120 MMHG | RESPIRATION RATE: 16 BRPM | HEART RATE: 83 BPM | WEIGHT: 195 LBS | TEMPERATURE: 98.2 F | OXYGEN SATURATION: 100 %

## 2018-04-19 PROCEDURE — 99024 POSTOP FOLLOW-UP VISIT: CPT

## 2018-09-20 ENCOUNTER — APPOINTMENT (OUTPATIENT)
Dept: RADIATION ONCOLOGY | Facility: CLINIC | Age: 63
End: 2018-09-20
Payer: COMMERCIAL

## 2018-09-20 VITALS
DIASTOLIC BLOOD PRESSURE: 89 MMHG | BODY MASS INDEX: 28.38 KG/M2 | TEMPERATURE: 98.2 F | WEIGHT: 202.71 LBS | OXYGEN SATURATION: 97 % | HEIGHT: 71 IN | SYSTOLIC BLOOD PRESSURE: 142 MMHG | HEART RATE: 8 BPM | RESPIRATION RATE: 16 BRPM

## 2018-09-20 PROCEDURE — 99213 OFFICE O/P EST LOW 20 MIN: CPT

## 2019-01-28 ENCOUNTER — APPOINTMENT (OUTPATIENT)
Dept: OPHTHALMOLOGY | Facility: CLINIC | Age: 64
End: 2019-01-28
Payer: COMMERCIAL

## 2019-01-28 DIAGNOSIS — H43.392 OTHER VITREOUS OPACITIES, LEFT EYE: ICD-10-CM

## 2019-01-28 DIAGNOSIS — H26.9 UNSPECIFIED CATARACT: ICD-10-CM

## 2019-01-28 PROCEDURE — 92004 COMPRE OPH EXAM NEW PT 1/>: CPT

## 2019-02-11 ENCOUNTER — APPOINTMENT (OUTPATIENT)
Dept: OPHTHALMOLOGY | Facility: CLINIC | Age: 64
End: 2019-02-11

## 2019-03-04 ENCOUNTER — TRANSCRIPTION ENCOUNTER (OUTPATIENT)
Age: 64
End: 2019-03-04

## 2019-03-21 ENCOUNTER — APPOINTMENT (OUTPATIENT)
Dept: RADIATION ONCOLOGY | Facility: CLINIC | Age: 64
End: 2019-03-21
Payer: COMMERCIAL

## 2019-03-21 VITALS
TEMPERATURE: 98.1 F | DIASTOLIC BLOOD PRESSURE: 88 MMHG | OXYGEN SATURATION: 99 % | WEIGHT: 199.52 LBS | SYSTOLIC BLOOD PRESSURE: 140 MMHG | RESPIRATION RATE: 16 BRPM | BODY MASS INDEX: 27.93 KG/M2 | HEIGHT: 71 IN | HEART RATE: 93 BPM

## 2019-03-21 PROCEDURE — 99213 OFFICE O/P EST LOW 20 MIN: CPT

## 2019-03-21 NOTE — REVIEW OF SYSTEMS
[Nocturia] : nocturia [Urinary Frequency: Grade 0] : Urinary Frequency: Grade 0 [Urinary Frequency] : no urinary frequency [FreeTextEntry2] : constant dribbling

## 2019-03-21 NOTE — HISTORY OF PRESENT ILLNESS
[FreeTextEntry1] : Mr. Turk is a 62 years old male with [place] 8 adenocarcinoma of the prostate. He was s/p radical prostatectomy on  10/30/17. He was treated with salvage radiation therapy with a dose of 7000 cGy. he completed treatment on 3/16/18. Patient tolerated radiation treatment well. He did not develop any grade 3 or higher acute toxicity or taking any unscheduled treatment breaks.\par \par Patient present here today for  follow up.  Patient report nocturia 1-2 times per night, urgency and constant dribbling. Patient stated he does not feel when he dribbling but only feel his pant wet. He denies dysuria, hematuria, and hesitancy. Has regular daily bowel function. Patient is not sexually active.

## 2019-03-21 NOTE — DISEASE MANAGEMENT
[BiopsyDate] : 9/5/17 [TotalCores] : 14 [TotalPositiveCores] : 4 [CTresults] : small sclerotic lesion in the right iliac bone is indeterminate. No lymphadenopathy. [MaxCoreInvolvement] : 95% [RadicalProstatectomyDate] : 10/30/17 [TotalNumberofnodesresected] : 3 [PositiveNodes] : 1

## 2019-07-23 NOTE — H&P PST ADULT - URINARY CATHETER
7/23/2019        RE: Tosha Barahona  [facility]  [address]  [address]  [facility] [address]        [facility]  [facility] Medical Record Number:  [id number]  Place of Service where encounter took place:  [facility] [[id number]]  Chief Complaint   Patient presents with     Nursing Home Acute       HPI:    Tosha Barahona  is a 72 year old (1946) female with PMH significant for dementia, asthma, CAD, DMTII, GERD, hypertension, seizure disorde, who is being seen today for an episodic care visit.      HPI information obtained from: facility chart records, facility staff, patient report, [facility] chart review and family/first contact friend (Jessica) report.     Resident of [facility] since October 2016, moved to Memory Care unit in March 2019 due to worsening cognition with safety concerns, potential to wander, with some psychotic features in the evening. She is managed with Donepezil, Memantine, and Quetiapine - neurology (Dr. Reza) manages these medications. She also takes Melatonin for sleep. She is followed by neurology for her dementia, as well as seizure disorder. Hospitalized for new onset seizure in 2016, started on levetiracetam, dose adjusted in Oct 2018 due to staring episodes with concern for postictal state. She was also hospitalized in December 2018 due to asthma exacerbation treated with steroids, supplemental oxygen, and nebulizer, convalesced in TCU, and returned to [facility] in January 2019. Current regimen in Budesonide nebs BID, Duonebs QID, Albuterol HFA PRN.    Other medical concerns include DMTII managed with basaglar and Prandial Humalog, metformin was stopped due to loose stools. Taking Plavix and Losartan. Hgb A1C 9.3% on 4/4/19. Hypertension managed with Atenolol, Losartan, and amlodipine. CAD on angiogram on 12/19/2001 at [facility] showed LAD 70-75% at D2, D2 40%, ramus intermedius 50-60%, and RCA 30%, managed with Plavix.  "Statin stopped due to memory concerns, patient and family have refused to resume, fenofibrate was also stopped. Seasonal allergies managed with certrizine and Fluticasone nasal spray. GERD managed with famotidine BID. Osteoporosis managed with vitamin D supplement and Fosamax (which she has been on for about 2.5 years), Loose stools intromittently over last several years, family has attributed to sugar substitute in diet soda, as well as dairy in diet. Improved with course of loperamide and Lactase TID with meals.     Today's concern is:  Follow-up today at request of friend, Jessica, to review Fosamax side effects. Concern that Fosamax causing increased phlegm production, family friend told her about GI side effects with medication. Fosamax started by her former PCP after compression fracture in back. Tosha does not want stop Fosamax, feels it is \"helping my bones.\"   PharmD reviewed FRAX score with Jessica today via telephone: BMI: 38.2 The ten year probability of fracture (%) without BMD Major osteoporotic 16 Hip Fracture 3.5. Resident denies stomach update or heartburn.   Says phlegm is much better. No loose stools or constipation since stopping loperamide.    Past Medical and Surgical History reviewed in Epic today.    MEDICATIONS:  Current Outpatient Medications   Medication Sig Dispense Refill     ACETAMINOPHEN PO Take 1,000 mg by mouth 3 times daily as needed for pain        albuterol (PROAIR HFA/PROVENTIL HFA/VENTOLIN HFA) 108 (90 Base) MCG/ACT inhaler Inhale 2 puffs into the lungs every 4 hours as needed for shortness of breath / dyspnea or wheezing       alendronate (FOSAMAX) 70 MG tablet TAKE 1 TABLET BY MOUTH ONCE WEEKLY ON AN EMPTY STOMACH WITH FULL GLASS OF H2O. 4 tablet 97     amLODIPine (NORVASC) 10 MG tablet TAKE 1 TABLET BY MOUTH ONCE DAILY 28 tablet 98     atenolol (TENORMIN) 100 MG tablet TAKE 1 TABLET BY MOUTH ONCE DAILY 28 tablet 98     Bioflavonoid Products (VITAMIN C) CHEW Take 1 tablet by mouth " every other day       budesonide (PULMICORT) 0.5 MG/2ML neb solution NEBULIZE THE CONTENT OF 1 VIAL INTO THE LUNGS TWICE DAILY 120 mL 97     cetirizine (ZYRTEC) 10 MG tablet TAKE 1 TABLET BY MOUTH ONCE DAILY 100 tablet 97     CLINDAMYCIN HCL PO Take 600 mg by mouth daily as needed (prior to dental work)       clopidogrel (PLAVIX) 75 MG tablet TAKE 1 TABLET BY MOUTH ONCE DAILY 28 tablet 98     donepezil (ARICEPT) 5 MG tablet TAKE 1 TABLET BY MOUTH ONCE DAILY 28 tablet 98     famotidine (PEPCID) 20 MG tablet TAKE 1 TABLET BY MOUTH TWICE DAILY 56 tablet 98     fluticasone (FLONASE) 50 MCG/ACT spray Spray 2 sprays into both nostrils daily       glucose 4 G CHEW chewable tablet Take 1-2 tablets by mouth as needed for low blood sugar 1 tablet for BS 70 or less  2 tablets for BS 70 or less and symptomatic       insulin lispro (HUMALOG VIAL) 100 UNIT/ML vial Give 4 units before breakfast, 10 units before lunch, and 12 units before dinner. Hold if not eating or  BG <120       insulin pen needle (NOVOFINE 30) 30G X 8 MM miscellaneous USE AS DIRECTED. TO INJECT INSULIN FOUR TIMES A  each 98     ipratropium - albuterol 0.5 mg/2.5 mg/3 mL (DUONEB) 0.5-2.5 (3) MG/3ML neb solution NEBULIZE THE CONTENT OF 1 VIAL INTO THE LUNGS FOUR TIMES A DAY;AND NEBULIZE THE CONTENT OF 1 VIAL INTO THE LUNGS TWICE DAILY AS NEEDED 180 mL 97     lactase (LACTAID) 3000 UNIT tablet Take 1 tablet (3,000 Units) by mouth 3 times daily (with meals) 90 tablet 97     levETIRAcetam (KEPPRA) 500 MG tablet TAKE 1 TABLET BY MOUTH EVERY MORNING 60 tablet 97     levETIRAcetam (KEPPRA) 750 MG tablet Take 750 mg by mouth At Bedtime       loperamide (IMODIUM) 2 MG capsule TAKE ONE CAPSULE BY MOUTH ONCE DAILY ON MONDAY, WEDNESDAY AND FRIDAY;& TAKE ONE CAPSULE BY MOUTH TWICE DAILY AS NEEDED FOR LOOSE STOOL. **DO 12 capsule 98     losartan (COZAAR) 100 MG tablet TAKE 1 TABLET BY MOUTH ONCE DAILY 28 tablet 98     MELATONIN PO Take 6 mg by mouth At Bedtime         memantine (NAMENDA) 10 MG tablet TAKE 1 TABLET BY MOUTH TWICE DAILY 120 tablet 0     menthol-zinc oxide (CALMOSEPTINE) 0.44-20.6 % OINT ointment Apply topically 4 times daily as needed for skin protection        miconazole (MICATIN; MICRO GUARD) 2 % powder twice daily as needed       QUEtiapine (SEROQUEL) 25 MG tablet TAKE ONE-FOURTH TABLET (6.25MG) BY MOUTH AT BEDTIME FOR 60 DOSES 7 tablet 98     senna-docusate (SENNA S) 8.6-50 MG tablet Take 2 tablets by mouth daily as needed for constipation       Skin Protectants, Misc. (EUCERIN) cream Apply topically 4 times daily as needed To lower extremeties       STATIN NOT PRESCRIBED (INTENTIONAL) Please choose reason not prescribed, below       triamcinolone (KENALOG) 0.1 % external cream Apply topically 2 times daily as needed for irritation       Vitamin D, Cholecalciferol, 1000 units CAPS Take 2,000 Units by mouth daily          REVIEW OF SYSTEMS:  4 point ROS including Respiratory, CV, GI and , other than that noted in the HPI,  is negative.  Meaningful history limited by cognitive impairment.     Objective:  /73   Pulse 83   Temp 97.7  F (36.5  C)   Resp 18   Wt 101.6 kg (224 lb)   BMI 37.28 kg/m     Exam:  GENERAL APPEARANCE:  Alert, in no distress, pleasant, cooperative, well dressed, in activity room.  EYES:  Sclera clear and conjunctiva normal, no discharge    RESP:  Non-labored breathing, palpation of chest normal, no chest wall tenderness, no respiratory distress, Lung sounds clear without adventitious breath sounds, patient is on room air. No cough.  CV:  Palpation - no murmur/non-displaced PMI, Auscultation - rate and rhythm regular, no murmur, no rub or gallop.   VASCULAR: No edema bilateral lower extremities.  ABDOMEN:  Rounded abd, normal bowel sounds, soft, nontender, no grimacing or guarding with palpation. .  M/S:   Gait and station ambulates independently with 4WW from activity room to her apartment..  NEURO: cranial nerves II-XII grossly  intact, no facial asymmetry, follows simple commands, moves all extremities symmetrically, no tremor  PSYCH: awake and alert, speech fluent, memory impaired, calm and cooperative.    Labs:   Recent labs in [facility] reviewed by me today.    CBC RESULTS:   Recent Labs   Lab Test 01/14/19  0554 01/07/19  0601   WBC 10.4 12.4*   RBC 4.38 4.51   HGB 12.4 12.6   HCT 38.3 39.2   MCV 87 87   MCH 28.3 27.9   MCHC 32.4 32.1   RDW 12.9 12.8    323       Last Basic Metabolic Panel:  Recent Labs   Lab Test 07/02/19 04/04/19    139   POTASSIUM 4.4 4.3   CHLORIDE 104 104   WU 9.0 9.1   CO2 35* 29   BUN 15 15   CR 0.79 0.75   GLC 62* 108*     GFR Estimate   Date Value Ref Range Status   07/02/2019 73 >60 ml/min/1.73_m2 Final     Liver Function Studies -   Recent Labs   Lab Test 10/01/18 11/25/17  2356   PROTTOTAL 7.2 7.1   ALBUMIN 3.6 3.6   BILITOTAL 0.4 0.3   ALKPHOS 58 69   AST 22 23   ALT 16 16       TSH   Date Value Ref Range Status   10/30/2018 1.03 0.40 - 4.00 mU/L Final   10/01/2018 0.05 (A) 0.40 - 4.00 mU/L Final     Lab Results   Component Value Date    A1C 9.3 04/04/2019    A1C 8.4 10/01/2018     DEXA 12/12/16   70 y.o. female with clinical osteoporosis by WHO/ISCD criteria  as evidenced by a T-score of -1.4 and a vertebral compression at the L2 spine.     The patient's 10 year probability of suffering a major osteoporotic fracture is 14% and the 10 year probability of suffering a hip fracture is 1.5%. The National Osteoporosis Foundation recommends pharmacologic   treatment for patient's with T-scores of negative 2.5 or less, patient's with prior history of fragility fractures, or patient's with 10 year probability of greater than 3% at hips or greater than 20% of suffering major osteoporotic fractures.    Consider pharmcologic therapy for unprovoked vertebral compression fracture which suggests osteoporosis.  A follow-up bone density measurement again in 2 years to assess efficacy of therapy is recommended.  Recommend continued Calcium and vitamin D supplementation and encourage regular weight bearing exercise.     Montserrat Booth MD CCD      ASSESSMENT/PLAN:  (M81.0) Osteoporosis   Comment: Started on Fosamax in January 2017 by PCP. Last DEXA January 2016. Family concerned about medication side effects, but patient denies any clear side effects and would like to continue medication.   Plan:   - Continue Fosamax  - Gets Calcium from diet, continue vitamin D supplement  - Since it has been two years consider repeat DEXA     (R19.5) Loose Stools  Comment: Loose stools resolved since starting Lactase. Family not open to stopping Aricept, aware it can have GI side effects. Not interested in GI follow-up.  Plan:  - Continue Lactase with meals    (R09.89) Phlegm in throat  Comment: Family concerned Phlegm in throat is side effect of Fosamax, PharmD reviewed today this is no typical side effect. Patient report Phlegm improved.  Plan:   - Continue to monitor clinically.     No new orders.    Reviewed family concerns with geriatric PharmD.    Electronically signed by:  REJI Red CNP       Sincerely,        REJI Red CNP     no

## 2019-10-01 ENCOUNTER — APPOINTMENT (OUTPATIENT)
Dept: UROLOGY | Facility: CLINIC | Age: 64
End: 2019-10-01
Payer: MEDICARE

## 2019-10-01 VITALS — DIASTOLIC BLOOD PRESSURE: 78 MMHG | HEART RATE: 91 BPM | SYSTOLIC BLOOD PRESSURE: 112 MMHG | TEMPERATURE: 98.8 F

## 2019-10-01 PROCEDURE — 99213 OFFICE O/P EST LOW 20 MIN: CPT

## 2019-10-01 NOTE — ASSESSMENT
[FreeTextEntry1] : Repeat PSA in 6 months\par Continuous dribbling noted on exam.  Incontinence pad moderately saturated\par Referral to Dr. Wadsworth given to patient to discuss surgical management.

## 2019-10-01 NOTE — HISTORY OF PRESENT ILLNESS
[FreeTextEntry1] : Patient underwent RALP in October 2017\par Pathology=GS 8, pT2, N1\par Started XRT in December 2017 and completed March 2018, received 1 dose of ADT in December 2017 but patient did not continue due to insurance not covering therapy.\par \par Most Recent PSA 9/9/19 was 1.06\par 3/2019-0.2\par 9/2018-0.1\par \par Denies any bone pain.  Negative biopsy of right iliac back in October 2017.\par Wears 3 pads a day, reports urinary urgency worse in am.  Krystal any hematuria or dysuria.  Stopped Tamsulosin related to side effects patient was experiencing.  States he has some palpitations and dry mouth from medication.

## 2019-10-01 NOTE — PHYSICAL EXAM
[General Appearance - Well Developed] : well developed [General Appearance - Well Nourished] : well nourished [Normal Appearance] : normal appearance [Well Groomed] : well groomed [General Appearance - In No Acute Distress] : no acute distress [] : no respiratory distress [Respiration, Rhythm And Depth] : normal respiratory rhythm and effort [Exaggerated Use Of Accessory Muscles For Inspiration] : no accessory muscle use [Oriented To Time, Place, And Person] : oriented to person, place, and time [Affect] : the affect was normal [Mood] : the mood was normal [Not Anxious] : not anxious [Abdomen Soft] : soft [Abdomen Tenderness] : non-tender [Costovertebral Angle Tenderness] : no ~M costovertebral angle tenderness [Normal Station and Gait] : the gait and station were normal for the patient's age

## 2019-10-04 ENCOUNTER — APPOINTMENT (OUTPATIENT)
Dept: UROLOGY | Facility: CLINIC | Age: 64
End: 2019-10-04
Payer: MEDICARE

## 2019-10-04 PROCEDURE — 99214 OFFICE O/P EST MOD 30 MIN: CPT

## 2019-10-04 NOTE — PHYSICAL EXAM
[General Appearance - Well Developed] : well developed [General Appearance - Well Nourished] : well nourished [Normal Appearance] : normal appearance [Well Groomed] : well groomed [General Appearance - In No Acute Distress] : no acute distress [Edema] : no peripheral edema [Respiration, Rhythm And Depth] : normal respiratory rhythm and effort [Exaggerated Use Of Accessory Muscles For Inspiration] : no accessory muscle use [Abdomen Soft] : soft [Abdomen Tenderness] : non-tender [Costovertebral Angle Tenderness] : no ~M costovertebral angle tenderness [Normal Station and Gait] : the gait and station were normal for the patient's age [] : no rash [Sensation] : the sensory exam was normal to light touch and pinprick [Oriented To Time, Place, And Person] : oriented to person, place, and time [Urethral Meatus] : meatus normal [Testes Tenderness] : no tenderness of the testes [Scrotum] : the scrotum was normal [Penis Abnormality] : normal uncircumcised penis [Testes Mass (___cm)] : there were no testicular masses [Anus Abnormality] : the anus and perineum were normal [FreeTextEntry1] : NEO - empty prostatic fossa.

## 2019-10-04 NOTE — HISTORY OF PRESENT ILLNESS
[FreeTextEntry1] : 63 yo gentleman with PMH prostate cancer s/p RALP 2017 with subsequent XRT referred for PPI. He c/o inc with cough, sneeze, lifting and exercise. He wears 5 pads during the day. He is dry at night, does not wear pads. He denies urge inc. He denies any other irr/obs urinary symptoms.

## 2019-10-04 NOTE — ASSESSMENT
[FreeTextEntry1] : \par \par Impression/plan: 63 yo gentleman with PMH prostate cancer s/p RALP 2017 with subsequent XRT referred for PPI.\par \par 1. UDS and cystoscopy.\par \par 2. Options for management of post-prostatectomy discussed with the patient at length today. Conservative options including pelvic floor physical therapy and exercises, behavioral modification techniques and bladder training, and Cunningham clamp were discussed at length. Surgical options discussed at length with the patient today as well. Surgical options include periurethral bulking agent, male sling, and artificial urinary sphincter. The patient is leaning towards an AUS. \par

## 2019-10-07 LAB — PSA SERPL-MCNC: 1.53 NG/ML

## 2019-10-10 ENCOUNTER — APPOINTMENT (OUTPATIENT)
Dept: RADIATION ONCOLOGY | Facility: CLINIC | Age: 64
End: 2019-10-10
Payer: MEDICARE

## 2019-10-10 VITALS
DIASTOLIC BLOOD PRESSURE: 81 MMHG | WEIGHT: 194.66 LBS | SYSTOLIC BLOOD PRESSURE: 119 MMHG | TEMPERATURE: 98.2 F | BODY MASS INDEX: 27.25 KG/M2 | HEART RATE: 86 BPM | HEIGHT: 71 IN | OXYGEN SATURATION: 96 % | RESPIRATION RATE: 16 BRPM

## 2019-10-10 PROCEDURE — 99213 OFFICE O/P EST LOW 20 MIN: CPT

## 2019-10-10 NOTE — DISEASE MANAGEMENT
[3] : T3 [a] : a [0] : M0 [5] : 5 [] : Patient had a Prostate MRI [IV] : IV [Radical Prostatectomy] : Radical Prostatectomy [Radiation Therapy] : Radiation  Therapy [8] : [place] Score 8 [Patient had a radical prostatectomy] : Patient had a radical prostatectomy  [Positive] : Positive margins [2] : T2 [X] : MX [1] : N1 [BiopsyDate] : 9/5/17 [TotalCores] : 14 [TotalPositiveCores] : 4 [MaxCoreInvolvement] : 95% [CTresults] : small sclerotic lesion in the right iliac bone is indeterminate. No lymphadenopathy. [RadicalProstatectomyDate] : 10/30/17 [TotalNumberofnodesresected] : 3 [PositiveNodes] : 1

## 2019-10-10 NOTE — HISTORY OF PRESENT ILLNESS
[FreeTextEntry1] : Mr. Turk is a 62 years old male with [place] 8 adenocarcinoma of the prostate. He was s/p radical prostatectomy on  10/30/17. He was treated with salvage radiation therapy with a dose of 7000 cGy. he completed treatment on 3/16/18. Patient tolerated radiation treatment well. He did not develop any grade 3 or higher acute toxicity or taking any unscheduled treatment breaks.\par \par Patient present here today for  follow up. His PSA is 1.53 ng/ml done on 10/1/19. Patient report nocturia 2-3 times per night and constant dribbling. uses 5 pads per day. He denies any other urinary bother. Has regular daily bowel function and not sexually active. Follow up with Dr. High on 10/1/19 regarding his dribbling. He was referred to Dr. Hampton who plan to do artificial urinary sphincter on patient in November.

## 2019-10-10 NOTE — REVIEW OF SYSTEMS
[Negative] : Endocrine [Urinary Incontinence: Grade 1 - Occasional (e.g., with coughing, sneezing, etc.), pads not indicated] : Urinary Incontinence: Grade 1 - Occasional (e.g., with coughing, sneezing, etc.), pads not indicated [Hematuria: Grade 0] : Hematuria: Grade 0 [Urinary Tract Pain: Grade 0] : Urinary Tract Pain: Grade 0 [Urinary Retention: Grade 0] : Urinary Retention: Grade 0 [Urinary Urgency: Grade 1 - Present] : Urinary Urgency: Grade 1 - Present [Urinary Frequency: Grade 0] : Urinary Frequency: Grade 0 [Ejaculation Disorder: Grade 1 - Diminished ejaculation] : Ejaculation Disorder: Grade 1 - Diminished ejaculation  [Erectile Dysfunction: Grade 1 - Decrease in erectile function (frequency or rigidity of erections) but intervention not indicated (e.g., medication or use of mechanical device, penile pump)] : Erectile Dysfunction: Grade 1 - Decrease in erectile function (frequency or rigidity of erections) but intervention not indicated (e.g., medication or use of mechanical device, penile pump) [Nocturia] : nocturia [IPSS Score (0-40): ___] : IPSS score: [unfilled] [EPIC-CP Score (0-60): ___] : EPIC-CP score: [unfilled] [FreeTextEntry2] : constant dribbling

## 2019-10-11 ENCOUNTER — APPOINTMENT (OUTPATIENT)
Dept: UROLOGY | Facility: CLINIC | Age: 64
End: 2019-10-11

## 2019-10-30 ENCOUNTER — TRANSCRIPTION ENCOUNTER (OUTPATIENT)
Age: 64
End: 2019-10-30

## 2019-11-15 ENCOUNTER — APPOINTMENT (OUTPATIENT)
Dept: UROLOGY | Facility: CLINIC | Age: 64
End: 2019-11-15
Payer: MEDICARE

## 2019-11-15 VITALS
BODY MASS INDEX: 27.16 KG/M2 | SYSTOLIC BLOOD PRESSURE: 124 MMHG | TEMPERATURE: 97.4 F | DIASTOLIC BLOOD PRESSURE: 85 MMHG | WEIGHT: 194 LBS | HEIGHT: 71 IN

## 2019-11-15 DIAGNOSIS — Z00.00 ENCOUNTER FOR GENERAL ADULT MEDICAL EXAMINATION W/OUT ABNORMAL FINDINGS: ICD-10-CM

## 2019-11-15 DIAGNOSIS — N39.41 URGE INCONTINENCE: ICD-10-CM

## 2019-11-15 LAB
BILIRUB UR QL STRIP: NORMAL
CLARITY UR: CLEAR
COLLECTION METHOD: NORMAL
GLUCOSE UR-MCNC: NORMAL
HCG UR QL: 0.2 EU/DL
HGB UR QL STRIP.AUTO: NORMAL
KETONES UR-MCNC: NORMAL
LEUKOCYTE ESTERASE UR QL STRIP: NORMAL
NITRITE UR QL STRIP: NORMAL
PH UR STRIP: 7
PROT UR STRIP-MCNC: NORMAL
SP GR UR STRIP: 1.02

## 2019-11-15 PROCEDURE — 51797 INTRAABDOMINAL PRESSURE TEST: CPT

## 2019-11-15 PROCEDURE — 51784 ANAL/URINARY MUSCLE STUDY: CPT

## 2019-11-15 PROCEDURE — 51728 CYSTOMETROGRAM W/VP: CPT

## 2019-11-15 PROCEDURE — 51741 ELECTRO-UROFLOWMETRY FIRST: CPT

## 2019-11-15 PROCEDURE — 81003 URINALYSIS AUTO W/O SCOPE: CPT | Mod: QW

## 2019-11-15 PROCEDURE — 99214 OFFICE O/P EST MOD 30 MIN: CPT | Mod: 25

## 2019-12-20 ENCOUNTER — OTHER (OUTPATIENT)
Age: 64
End: 2019-12-20

## 2020-01-21 ENCOUNTER — APPOINTMENT (OUTPATIENT)
Dept: UROLOGY | Facility: CLINIC | Age: 65
End: 2020-01-21
Payer: MEDICARE

## 2020-01-21 ENCOUNTER — APPOINTMENT (OUTPATIENT)
Dept: UROLOGY | Facility: CLINIC | Age: 65
End: 2020-01-21

## 2020-01-21 VITALS — TEMPERATURE: 98.1 F | HEART RATE: 103 BPM | SYSTOLIC BLOOD PRESSURE: 155 MMHG | DIASTOLIC BLOOD PRESSURE: 78 MMHG

## 2020-01-21 PROCEDURE — 99213 OFFICE O/P EST LOW 20 MIN: CPT

## 2020-01-21 NOTE — PHYSICAL EXAM
[Normal Appearance] : normal appearance [General Appearance - Well Developed] : well developed [General Appearance - Well Nourished] : well nourished [Well Groomed] : well groomed [General Appearance - In No Acute Distress] : no acute distress [Oriented To Time, Place, And Person] : oriented to person, place, and time [Affect] : the affect was normal [Mood] : the mood was normal [Not Anxious] : not anxious

## 2020-01-21 NOTE — HISTORY OF PRESENT ILLNESS
[FreeTextEntry1] : CC: Elevated PSA\par \par Patient underwent RALP in October 2017\par Pathology=GS 8, pT2, N1\par Started XRT in December 2017 and completed March 2018, received 1 dose of ADT in December 2017 but patient did not continue due to insurance not covering therapy.\par \par Most Recent PSA 1/7/20 2.64\par 3/2019-0.2\par 9/2018-0.1\par 10/2019-1.53\par \par Denies any bone pain. Negative biopsy of right iliac back in October 2017.\par

## 2020-01-31 ENCOUNTER — OUTPATIENT (OUTPATIENT)
Dept: OUTPATIENT SERVICES | Facility: HOSPITAL | Age: 65
LOS: 1 days | End: 2020-01-31
Payer: MEDICARE

## 2020-01-31 DIAGNOSIS — K40.90 UNILATERAL INGUINAL HERNIA, WITHOUT OBSTRUCTION OR GANGRENE, NOT SPECIFIED AS RECURRENT: Chronic | ICD-10-CM

## 2020-01-31 PROCEDURE — 78815 PET IMAGE W/CT SKULL-THIGH: CPT

## 2020-01-31 PROCEDURE — A9588: CPT

## 2020-01-31 PROCEDURE — 82962 GLUCOSE BLOOD TEST: CPT

## 2020-01-31 PROCEDURE — 78815 PET IMAGE W/CT SKULL-THIGH: CPT | Mod: 26,PI

## 2020-02-06 ENCOUNTER — APPOINTMENT (OUTPATIENT)
Dept: RADIATION ONCOLOGY | Facility: CLINIC | Age: 65
End: 2020-02-06

## 2020-02-08 ENCOUNTER — OUTPATIENT (OUTPATIENT)
Dept: OUTPATIENT SERVICES | Facility: HOSPITAL | Age: 65
LOS: 1 days | Discharge: HOME | End: 2020-02-08

## 2020-02-08 DIAGNOSIS — C61 MALIGNANT NEOPLASM OF PROSTATE: ICD-10-CM

## 2020-02-08 DIAGNOSIS — K40.90 UNILATERAL INGUINAL HERNIA, WITHOUT OBSTRUCTION OR GANGRENE, NOT SPECIFIED AS RECURRENT: Chronic | ICD-10-CM

## 2020-02-11 ENCOUNTER — APPOINTMENT (OUTPATIENT)
Dept: HEMATOLOGY ONCOLOGY | Facility: CLINIC | Age: 65
End: 2020-02-11
Payer: MEDICARE

## 2020-02-11 ENCOUNTER — RESULT REVIEW (OUTPATIENT)
Age: 65
End: 2020-02-11

## 2020-02-11 VITALS
TEMPERATURE: 98.06 F | HEART RATE: 113 BPM | SYSTOLIC BLOOD PRESSURE: 135 MMHG | WEIGHT: 196.21 LBS | OXYGEN SATURATION: 98 % | BODY MASS INDEX: 26.87 KG/M2 | RESPIRATION RATE: 17 BRPM | HEIGHT: 71.65 IN | DIASTOLIC BLOOD PRESSURE: 88 MMHG

## 2020-02-11 DIAGNOSIS — Z82.3 FAMILY HISTORY OF STROKE: ICD-10-CM

## 2020-02-11 DIAGNOSIS — Z80.3 FAMILY HISTORY OF MALIGNANT NEOPLASM OF BREAST: ICD-10-CM

## 2020-02-11 DIAGNOSIS — Z86.79 PERSONAL HISTORY OF OTHER DISEASES OF THE CIRCULATORY SYSTEM: ICD-10-CM

## 2020-02-11 DIAGNOSIS — Z81.1 FAMILY HISTORY OF ALCOHOL ABUSE AND DEPENDENCE: ICD-10-CM

## 2020-02-11 LAB
HCT VFR BLD CALC: 46.9 % — SIGNIFICANT CHANGE UP (ref 39–50)
HGB BLD-MCNC: 15 G/DL — SIGNIFICANT CHANGE UP (ref 13–17)
MCHC RBC-ENTMCNC: 30.8 PG — SIGNIFICANT CHANGE UP (ref 27–34)
MCHC RBC-ENTMCNC: 32.1 G/DL — SIGNIFICANT CHANGE UP (ref 32–36)
MCV RBC AUTO: 96 FL — SIGNIFICANT CHANGE UP (ref 80–100)
PLATELET # BLD AUTO: 185 K/UL — SIGNIFICANT CHANGE UP (ref 150–400)
RBC # BLD: 4.88 M/UL — SIGNIFICANT CHANGE UP (ref 4.2–5.8)
RBC # FLD: 11.3 % — SIGNIFICANT CHANGE UP (ref 10.3–14.5)
WBC # BLD: 5.2 K/UL — SIGNIFICANT CHANGE UP (ref 3.8–10.5)
WBC # FLD AUTO: 5.2 K/UL — SIGNIFICANT CHANGE UP (ref 3.8–10.5)

## 2020-02-11 PROCEDURE — 99205 OFFICE O/P NEW HI 60 MIN: CPT

## 2020-02-11 RX ORDER — TAMSULOSIN HYDROCHLORIDE 0.4 MG/1
0.4 CAPSULE ORAL
Qty: 90 | Refills: 3 | Status: DISCONTINUED | COMMUNITY
Start: 2018-01-29 | End: 2020-02-11

## 2020-02-11 RX ORDER — LOSARTAN POTASSIUM AND HYDROCHLOROTHIAZIDE 12.5; 1 MG/1; MG/1
100-12.5 TABLET ORAL
Refills: 0 | Status: DISCONTINUED | COMMUNITY
End: 2020-02-11

## 2020-02-11 RX ORDER — OXYBUTYNIN CHLORIDE 10 MG/1
10 TABLET, EXTENDED RELEASE ORAL DAILY
Qty: 30 | Refills: 6 | Status: DISCONTINUED | COMMUNITY
Start: 2019-11-15 | End: 2020-02-11

## 2020-02-11 RX ORDER — OMEPRAZOLE MAGNESIUM 20 MG/1
20 CAPSULE, DELAYED RELEASE ORAL
Refills: 0 | Status: DISCONTINUED | COMMUNITY
End: 2020-02-11

## 2020-02-11 RX ORDER — TAMSULOSIN HYDROCHLORIDE 0.4 MG/1
0.4 CAPSULE ORAL
Qty: 90 | Refills: 3 | Status: DISCONTINUED | COMMUNITY
Start: 2018-01-02 | End: 2020-02-11

## 2020-02-11 NOTE — REVIEW OF SYSTEMS
[Loss of Hearing] : loss of hearing [Incontinence] : incontinence [Fever] : no fever [Chills] : no chills [Fatigue] : no fatigue [Recent Change In Weight] : ~T no recent weight change [Dysphagia] : no dysphagia [Odynophagia] : no odynophagia [Nosebleeds] : no nosebleeds [Hoarseness] : no hoarseness [Mucosal Pain] : no mucosal pain [Chest Pain] : no chest pain [Lower Ext Edema] : no lower extremity edema [Palpitations] : no palpitations [SOB on Exertion] : no shortness of breath during exertion [Cough] : no cough [Abdominal Pain] : no abdominal pain [Vomiting] : no vomiting [Diarrhea] : no diarrhea [Dysuria] : no dysuria [Constipation] : no constipation [Joint Pain] : no joint pain [Joint Stiffness] : no joint stiffness [Muscle Pain] : no muscle pain [Skin Rash] : no skin rash [Dizziness] : no dizziness [Anxiety] : no anxiety [Depression] : no depression [Muscle Weakness] : no muscle weakness [Easy Bruising] : no tendency for easy bruising [Easy Bleeding] : no tendency for easy bleeding [FreeTextEntry3] : glasses for reading [FreeTextEntry2] : appetite good [FreeTextEntry4] : 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

## 2020-02-11 NOTE — ASSESSMENT
[FreeTextEntry1] : Isaac Turk was seen in consultation today. This visit was conducted with an . In 2017, he was found to have a markedly elevated PSA at nearly 31. He had a prior negative biopsy in 2015. He underwent a radical prostatectomy. His PSA was undetectable and he received salvage radiation therapy until March 2018. He received one 6 month dose of Lupron with the intention of treating him for 3 years duration, but there was some issue with insurance would not pay for it, and he only received one dose. His PSA has been rising recently. In January it reached 2.64.\par \par Today his PSA was 3.74. The testosterone level was normal. The chemistry panel was normal as was the CBC.\par \par He feels well. He reports no pains. Urinary flow is good. He does have incontinence. Nocturia occurs 2 times per night. He wears a pad during the day. He was apparently recommended a Cunningham clamp, but was then told not to use it at this particular time.\par \par A comprehensive history was obtained, the physical examination was performed.\par \par He had a recent auxumin PET scan done at [facility]. We had to call for the results as they apparently not found in our system. He were to areas of focal activity, associated with normal size lymph nodes. Due to dosimetry and normal appearance of the lymph nodes along with their small size, metastatic disease was not strongly suspected.\par \par We talked about the natural history of prostate cancer, the meaning of the [place] score. His risk for recurrence given his high risk features at the time of diagnosis. We discussed the role of androgen deprivation therapy along with the risks and benefits of such. Despite the fact that there were no findings of note on the PET scan, the elevated PSA indicates that there are malignant cells growing, but they are not detectable at this particular time. He understands that the objective of treatment is for disease control and not cure.\par \par We discussed the adverse effects of androgen deprivation therapy at length, and I am making arrangements for him to receive treatment this next week. I have asked him to return to see me in 3 months time where his PSA will be repeated and he will receive his next injection.\par \par All questions were answered to the best of my ability and to his apparent satisfaction.

## 2020-02-11 NOTE — REASON FOR VISIT
[Initial Consultation] : an initial consultation [FreeTextEntry2] : Milagros Keller [TWNoteComboBox1] : American Sign Language

## 2020-02-11 NOTE — CONSULT LETTER
[Dear  ___] : Dear  [unfilled], [Consult Letter:] : I had the pleasure of evaluating your patient, [unfilled]. [Please see my note below.] : Please see my note below. [Consult Closing:] : Thank you very much for allowing me to participate in the care of this patient.  If you have any questions, please do not hesitate to contact me. [Sincerely,] : Sincerely, [DrCheng  ___] : Dr. YEUNG

## 2020-02-11 NOTE — RESULTS/DATA
[FreeTextEntry1] : Final Diagnosis\par 1. Prostate, MRI target lesion, biopsy\par      -Benign fibromuscular tissue.\par      -Prostatic glands are not identified.\par 2, 3. Prostate, right base, right mid, biopsies\par      -Benign prostate tissue. \par 4. Prostate, right apex, biopsy\par      -Benign fibromuscular tissue.\par      -Prostatic glands are not identified.\par 5. Prostate, left base, biopsy\par      -Benign prostate tissue. \par 6. Prostate, left mid, biopsy\par      -Adenocarcinoma of the prostate, Prognostic Grade Group 5 ([place] score 4+5=9) involving 80% (11 mm in length) of 1 of 2 core(s).\par      Adenocarcinoma of the prostate, Prognostic Grade Group 4 ([place] score 4+4=8) involving 25% (3.5 mm in length) of 1 of 2 core(s).\par      -A total of 2 of 2 cores involved by carcinoma.\par 7. Prostate, left apex, biopsy\par      -Adenocarcinoma of the prostate, Prognostic Grade Group 4 ([place] score 4+4=8) involving 95% and 30% (16 mm and 1 mm in length) of 2 of 2 core(s).\par Comment:\par Dr. 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  Bone marrow elements show trilineage hematopoiesis. These changes are reactive in nature most compatible with bone infarction. No epithelial malignancy identified. Clinical-pathological-radiographic correlation is essential.\par This case was reviewed for  with bone pathologists Deborah Rouse and Nighat and cytopathologists who concur(s) with the diagnosis on 10/30/17.\par Dr. High was notified of the diagnosis on 10/30/17 11:10.\par Screened by: Leo Vickers CT(ASCP)\par Verified by: JADEN Urbano\par (Electronic Signature)\par Reported on: 10/30/17 1\par ****************************************************\par Final Diagnosis\par 1. Pelvic lymph nodes, bilateral, excision\par      - One of three lymph nodes positive for metastatic carcinoma (1/3)\par 2. Prostate and seminal vesicles, radical prostatectomy\par      - Adenocarcinoma of the prostate, Prognostic Grade Group 4 ([place] score 4+4=8),\par        Tertiary pattern: 5\par      - See synoptic summary\par Verified by: JADEN Urbano\par (Electronic Signature)\par Reported on: 11/02/17 16:34 EDT,[address] [place] [address]\par _________________________________________________________________\par Synoptic Summary\par PROSTATE GLAND: Radical Prostatectomy\par Prostate Size:  49 gm\par Histologic Type:  Adenocarcinoma (conventional, not otherwise specified)\par Histologic Grade:  4 + 4 = 8 (Group 4) Tertiary pattern: 5\par Treatment Effect on Carcinoma:  Not identified\par Tumor Quantitation:  Moderate, 10%-50% of the gland\par Location: Left anterior (apex to base), left posterior (apex, mid), right anterior (apex to base), right posterior (apex to mid) 	\par Extraprostatic Extension Absent\par Margins:  \par Margin(s) involved by invasive carcinoma: Location and nature of positive margin: \par      Left anterior apex focal (slide 2G)\par      Right  posterior apex focal  (slide AJ)    \par Seminal Vesicle Invasion:  Absent\par Pelvic Lymph Nodes (including all parts):  Metastatic carcinoma in 1 of 3 lymph nodes   Extranodal extension: not identified (tumor is present in lymphatics within adipose tissue)\par Diameter of largest lymph node metastasis: 9 mm\par Pathologic Staging (pTNM)\par      TNM Descriptors:  Not applicable\par      Primary Tumor (pT):  pT2:  Organ confined \par      Regional Lymph Nodes (pN):  pN1:Metastasis in regional lymph node or nodes\par      Distant Metastasis (pM):  pMX:	Distant metastasis cannot be assessed\par Lymph-Vascular Invasion:  Not identified\par Additional Pathologic Findings:  High-grade prostatic intraepithelial neoplasia (PIN)\par ******************************************************\par EXAM: MRI PELVIS W & W O CONTRAST \par PROCEDURE DATE: 08/03/2017 \par INTERPRETATION: CLINICAL INFORMATION: Elevated PSA of 28. Negative biopsy in 2015. \par 3T MRI of the prostate is performed using a phase array cardiac coil. \par The following sequences were obtained: \par Small field-of-view Axial, coronal and sagittal fast spin-echo T2-weighted images. \par Small field-of-view axial T1-weighted images. \par Dynamic Axial T1 prior and following administration of 10 cc of Gadavist. 0 cc were discarded. \par Large field-of-view axial T1 and T2 weight images of the pelvis. \par Axial Diffusion. \par Study was processed using a Drifty software. Gland segmentation and lesion marking are performed on a stand alone Drifty workstation. \par Comparison: None. \par FINDINGS: \par Prostate gland: \par Size: 5.0 x 4.2 x 3.6 cm. Volume 40 cc. \par Hemorrhage: none. \par Central gland: Mild nodular Central gland hyperplasia without mass effect on the urinary bladder. \par Peripheral zone: No suspicious lesions. \par Lesion #: 1 \par Location: Low apex central gland at the midline. \par Zone: 5 and 6 anterior. \par Slice# range: Series 5, image 20-22, Center: Image 21. \par Size (Transverse, AP, CC): 16 x 16 x 19 mm. \par T2: Low charcoal-like T2 signal, PIRAD score 4. \par ADC/Diffusion: ADC: Low signal. High B value: High signal, PIRAD score 5. \par Enhancement: Early, PIRAD score 4. \par Adjacent capsule: Lesion is in the very low apex where there is no fibrous capsule. \par Overall lesion suspicion score: 5 \par Neurovascular bundle: Unremarkable \par Seminal vesicles: Unremarkable \par Lymph nodes: No pelvic adenopathy \par Osseous structures: No suspicious lesions identified \par Urinary bladder: unremarkable \par Additional findings: None. \par IMPRESSION: \par Large central gland lesion in the low apex consistent with BI-RADS 5. This is amenable to MRI/ultrasound fusion biopsy. \par Overall score 5: Score 5= Clinically significant disease is highly likely to be present \par ESUR prostate MR guidelines 2012 Eur Radiol(2012) 22:746-757 \par Score 1= Clinically significant disease is highly unlikely to be present \par Score 2= Clinically significant disease is unlikely to be present \par Score 3= Clinically significant disease is equivocal \par Score 4= Clinically significant disease is likely to be present \par Score 5= Clinically significant disease is highly likely to be present \par KADIE FITZGERALD M.D., ATTENDING RADIOLOGIST \par This document has been electronically signed. Aug 7 2017 3\par ******************************************************\par EXAM: NM BONE IMG WHOLE BODY \par EXAM: NM BONE SPECT CT \par PROCEDURE DATE: 10/04/2017 \par INTERPRETATION: CLINICAL INFORMATION: 62 year-old male with prostate carcinoma and elevated PSA, referred for evaluation \par RADIOPHARMACEUTICAL: 21.2 mCi Tc-99m HDP, I.V. \par TECHNIQUE: Whole-body planar images were obtained in the anterior and posterior projections approximately 2-3 hours after tracer injection. \par SPECT/CT of the pelvis and lumbar spine was also performed. The CT component of the study was used for attenuation correction and anatomic localization. \par COMPARISON: No previous bone scan for comparison. \par FINDINGS: There are small foci of increased tracer accumulation in the spinous processes of L3 and L4 vertebrae and in the right posterior iliac \par bone adjacent to the right sacroiliac joint, with corresponding mild sclerosis on the CT component of the study. There are degenerative changes \par in the major joints. There is physiologic tracer distribution in the remainder of the visualized skeleton. \par Both kidneys are visualized and appear symmetric. \par IMPRESSION: Bone scan demonstrates: \par Small, non specific foci of increased tracer accumulation in the spinous processes of L3 and L4 vertebrae and in the right iliac bone adjacent to the \par right sacroiliac joint. While these findings may represent post traumatic or degenerative changes, metastatic disease cannot be excluded. \par Degenerative disease in the major joints. \par PIEDAD LARA M.D., NUCLEAR MEDICINE ATTENDING \par This document has been electronically signed. Oct 4 2017 4:51PM \par **************************************************************\par EXAM: CT ABDOMEN AND PELVIS OC IC \par PROCEDURE DATE: 10/04/2017 \par INTERPRETATION: CLINICAL INFORMATION: Prostate cancer. \par COMPARISON: None. \par PROCEDURE: \par CT of the Abdomen and Pelvis was performed with intravenous contrast. Intravenous contrast: 90 ml Omnipaque 350. 10 ml discarded. \par Oral contrast: positive contrast was administered. Sagittal and coronal reformats were performed. \par FINDINGS: \par LOWER CHEST: Within normal limits. \par LIVER: Within normal limits. \par BILE DUCTS: Normal caliber. \par GALLBLADDER: Within normal limits. \par SPLEEN: Within normal limits. \par PANCREAS: Within normal limits. \par ADRENALS: Within normal limits. \par KIDNEYS/URETERS: Within normal limits except for a subcentimeter hypodense right lower pole lesion too small to characterize. \par BLADDER: Within normal limits. \par REPRODUCTIVE ORGANS: Enlarged prostate. \par BOWEL: No bowel obstruction. Appendix is normal. \par PERITONEUM: No ascites. \par VESSELS: Within normal limits. \par RETROPERITONEUM: No lymphadenopathy. \par ABDOMINAL WALL: Within normal limits. \par BONES: Degenerative changes involving the L3 and L4 spinous processes. A small sclerotic lesion in the upper right iliac bone adjacent to the SI \par joint on series 2 image 83 is indeterminate. \par IMPRESSION: Small sclerotic lesion in the right iliac bone is indeterminate. \par No lymphadenopathy. \par MATIAS NIETO M.D., ATTENDING RADIOLOGIST \par This document has been electronically signed. Oct 5 2017 8:35AM \par *******************************************************\par EXAM: MRI PELVIS BONY ONLY WO CONT \par PROCEDURE DATE: 10/23/2017 \par INTERPRETATION: MRI OF THE BONY PELVIS. \par CLINICAL INFORMATION: Lesion seen in the right iliac bone on prior CT scan. Prostate cancer. \par TECHNIQUE: Multiplanar MR imaging was obtained of the bony pelvis. Comparison is made to prior CT scan from 10/4/2017 and prior bone scan from 10/4/2017. \par FINDINGS: \par There is a 1.2 cm round lesion immediately adjacent to the right SI joint within the right iliac bone. This does not demonstrate discernible fat on \par T1-weighted imaging and demonstrates central low signal with minimal edema signal peripherally on T2 fat-suppressed imaging. CT-guided biopsy is \par warranted for complete evaluation as this lesion is concerning for osseous metastatic disease. \par There is mild degenerative changes of the hips bilaterally with a small right hip joint effusion. The abductors, adductors, iliopsoas and hamstring \par muscles and tendons are normal. There is moderate multilevel lower lumbar facet arthrosis. \par IMPRESSION: \par 1.2 cm round low signal lesion adjacent to the right SI joint within the right iliac bone that does not demonstrate internal fat and has mild \par peripheral edema signal on T2 fat-suppressed imaging. CT-guided biopsy is warranted to exclude metastatic disease. \par ELANA ISBELL M.D., ATTENDING RADIOLOGIST \par This document has been electronically signed. Oct 25 2017 8:58AM \par *******************************************************\par EXAM: CT BX BONE NDL DEEP # \par PROCEDURE DATE: 10/27/2017 \par INTERPRETATION: \par CT GUIDED RIGHT ILIAC BONE BIOPSY. \par CLINICAL INFORMATION: 61-year-old male with history of prostate cancer presented for biopsy of a new sclerotic lesion in the right iliac bone. \par Comparison: MRI of the bony pelvis on 10/23/2017 [address] CT scan of the abdomen and pelvis on 10/4/2017. \par ANESTHESIA: Generalized \par CONSENT: The risks, benefits and alternatives of the procedure were explained to the patient and written informed consent was obtained. \par FINDINGS: \par Axial CT images demonstrate a 0.7 x 0.8 cm sclerotic lesion in the upper right iliac bone adjacent to the sacroiliac joint. \par After administration of local anesthesia a 13-gauge biopsy needle was advanced into the lesion with 3 core biopsies utilizing a coaxial technique \par obtained and 2 fine needle aspirations performed with a 22-gaugeneedle. \par Confirmation of needle placement was performed by CT. A cytopathologist was on site for evaluation of the specimen. \par The patient tolerated the procedure well and was discharged in stable condition. \par IMPRESSION: \par Successful biopsy of sclerotic lesion in the right iliac bone adjacent to the SI joint. \par SANDHYA MIMS M.D., RADIOLOGY FELLOW \par This document has been electronically signed. \par MARCELO MOROCHO M.D., ATTENDING RADIOLOGIST \par This document has been electronically signed. Oct 27 2017 3:51PM \par **********************************************\par PET scan at [facility] negative..\par \par \par

## 2020-02-11 NOTE — HISTORY OF PRESENT ILLNESS
[Disease: _____________________] : Disease: [unfilled] [T: ___] : T[unfilled] [N: ___] : N[unfilled] [de-identified] : John 4+4, tertiary pattern 5 at prostatectomy [de-identified] : Mr. Turk is seen in consultation on February 11, 2020. In 2017, he was found to have a markedly elevated PSA. PSA was 30.8. \par He had a history of prior negative prostate biopsy in (2015). He had an MRI performed which revealed clinically significant disease. 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 Tumor was grade group 5 involving 80% of one core. Another biopsy in the same zone was grade group for involving 25% of one core. The left apex showed adenocarcinoma of prostate, grade group 4 involving 95% and 30%, of 2 out of 2 cores. All other cores were negative. CT-guided core biopsy was performed of the right iliac bone, which did not reveal evidence of metastases. He underwent a radical prostatectomy in the latter part of 2017. Adenocarcinoma of the prostate, prognostic grade group for ([place] 4+4 equals 8) with a tertiary pattern 5 was present. Disease was confined to the gland. One regional lymph node of 3 resected was positive for tumor, measuring 9 mm in size.\par His PSA was not undetectable in the postoperative setting, and he was referred for radiation therapy. This was administered from January 22 of 2018 until March 16, 2018. The total dose was 7000 cGy. he received a 6 month Lupron injection post op, (12/12/17) planned for 3 years of treatment, but insurance changes and apparently denied. \par On January 7, 2020, he had a PSA of 2.64. He was referred for a PET scan, and referred for medical oncology consultation.\par PSA values that are available revealed 24.42 on March 28, 2017. On June 19, 2017 it was 28.86 in August 15, 2017 it was 30.84. Postoperatively, the PSA was 1.69 on December 5, 2017 and 1.06 on September 9, 2019. It was 1.53 on October 1, 2019 and 2.64 on January 7, 2020.\par He had a PET CT done, revealed no evidence of mets.\par Feels well, no pains noted. Urine flow is good, has incontinence. No blood, no dysuria. Nocturia x 2. Wears a pad during the day. He has been recommended a Cunningham clamp.

## 2020-02-13 LAB
ALBUMIN SERPL ELPH-MCNC: 4.4 G/DL
ALP BLD-CCNC: 56 U/L
ALT SERPL-CCNC: 27 U/L
ANION GAP SERPL CALC-SCNC: 12 MMOL/L
AST SERPL-CCNC: 27 U/L
BILIRUB SERPL-MCNC: 0.4 MG/DL
BUN SERPL-MCNC: 18 MG/DL
CALCIUM SERPL-MCNC: 9.7 MG/DL
CHLORIDE SERPL-SCNC: 104 MMOL/L
CO2 SERPL-SCNC: 27 MMOL/L
CREAT SERPL-MCNC: 1.22 MG/DL
GLUCOSE SERPL-MCNC: 87 MG/DL
POTASSIUM SERPL-SCNC: 4.5 MMOL/L
PROT SERPL-MCNC: 6.9 G/DL
PSA SERPL-MCNC: 3.74 NG/ML
SODIUM SERPL-SCNC: 142 MMOL/L
TESTOST SERPL-MCNC: 402 NG/DL

## 2020-02-14 RX ORDER — ATORVASTATIN CALCIUM 80 MG/1
1 TABLET, FILM COATED ORAL
Qty: 0 | Refills: 0 | DISCHARGE

## 2020-02-14 RX ORDER — FAMOTIDINE 10 MG/ML
1 INJECTION INTRAVENOUS
Qty: 0 | Refills: 0 | DISCHARGE

## 2020-02-19 ENCOUNTER — APPOINTMENT (OUTPATIENT)
Dept: INFUSION THERAPY | Facility: HOSPITAL | Age: 65
End: 2020-02-19

## 2020-04-09 PROBLEM — Z00.00 ENCOUNTER FOR PREVENTIVE HEALTH EXAMINATION: Status: ACTIVE | Noted: 2020-04-09

## 2020-05-12 NOTE — CONSULT LETTER
[Dear  ___] : Dear  [unfilled], [Courtesy Letter:] : I had the pleasure of seeing your patient, [unfilled], in my office today. [Please see my note below.] : Please see my note below. [Consult Closing:] : Thank you very much for allowing me to participate in the care of this patient.  If you have any questions, please do not hesitate to contact me. [Sincerely,] : Sincerely, [DrCheng  ___] : Dr. YEUNG

## 2020-05-13 ENCOUNTER — OUTPATIENT (OUTPATIENT)
Dept: OUTPATIENT SERVICES | Facility: HOSPITAL | Age: 65
LOS: 1 days | Discharge: HOME | End: 2020-05-13

## 2020-05-13 DIAGNOSIS — K40.90 UNILATERAL INGUINAL HERNIA, WITHOUT OBSTRUCTION OR GANGRENE, NOT SPECIFIED AS RECURRENT: Chronic | ICD-10-CM

## 2020-05-13 DIAGNOSIS — C61 MALIGNANT NEOPLASM OF PROSTATE: ICD-10-CM

## 2020-05-14 PROBLEM — H91.90 UNSPECIFIED HEARING LOSS, UNSPECIFIED EAR: Chronic | Status: ACTIVE | Noted: 2017-10-19

## 2020-05-14 PROBLEM — K29.70 GASTRITIS, UNSPECIFIED, WITHOUT BLEEDING: Chronic | Status: ACTIVE | Noted: 2017-10-19

## 2020-05-14 PROBLEM — E78.5 HYPERLIPIDEMIA, UNSPECIFIED: Chronic | Status: ACTIVE | Noted: 2017-10-19

## 2020-05-14 PROBLEM — I10 ESSENTIAL (PRIMARY) HYPERTENSION: Chronic | Status: ACTIVE | Noted: 2017-10-19

## 2020-05-14 PROBLEM — K21.9 GASTRO-ESOPHAGEAL REFLUX DISEASE WITHOUT ESOPHAGITIS: Chronic | Status: ACTIVE | Noted: 2017-10-19

## 2020-05-19 ENCOUNTER — APPOINTMENT (OUTPATIENT)
Dept: INFUSION THERAPY | Facility: HOSPITAL | Age: 65
End: 2020-05-19

## 2020-05-19 ENCOUNTER — RESULT REVIEW (OUTPATIENT)
Age: 65
End: 2020-05-19

## 2020-05-19 ENCOUNTER — APPOINTMENT (OUTPATIENT)
Dept: HEMATOLOGY ONCOLOGY | Facility: CLINIC | Age: 65
End: 2020-05-19
Payer: MEDICARE

## 2020-05-19 VITALS
BODY MASS INDEX: 27.92 KG/M2 | DIASTOLIC BLOOD PRESSURE: 88 MMHG | RESPIRATION RATE: 18 BRPM | TEMPERATURE: 97.6 F | WEIGHT: 203.92 LBS | SYSTOLIC BLOOD PRESSURE: 130 MMHG | OXYGEN SATURATION: 98 % | HEART RATE: 105 BPM

## 2020-05-19 LAB
BASOPHILS # BLD AUTO: 0.02 K/UL — SIGNIFICANT CHANGE UP (ref 0–0.2)
BASOPHILS NFR BLD AUTO: 0.5 % — SIGNIFICANT CHANGE UP (ref 0–2)
EOSINOPHIL # BLD AUTO: 0.07 K/UL — SIGNIFICANT CHANGE UP (ref 0–0.5)
EOSINOPHIL NFR BLD AUTO: 1.8 % — SIGNIFICANT CHANGE UP (ref 0–6)
HCT VFR BLD CALC: 40.6 % — SIGNIFICANT CHANGE UP (ref 39–50)
HGB BLD-MCNC: 13.8 G/DL — SIGNIFICANT CHANGE UP (ref 13–17)
IMM GRANULOCYTES NFR BLD AUTO: 0.3 % — SIGNIFICANT CHANGE UP (ref 0–1.5)
LYMPHOCYTES # BLD AUTO: 0.82 K/UL — LOW (ref 1–3.3)
LYMPHOCYTES # BLD AUTO: 20.6 % — SIGNIFICANT CHANGE UP (ref 13–44)
MCHC RBC-ENTMCNC: 31.6 PG — SIGNIFICANT CHANGE UP (ref 27–34)
MCHC RBC-ENTMCNC: 34 GM/DL — SIGNIFICANT CHANGE UP (ref 32–36)
MCV RBC AUTO: 92.9 FL — SIGNIFICANT CHANGE UP (ref 80–100)
MONOCYTES # BLD AUTO: 0.51 K/UL — SIGNIFICANT CHANGE UP (ref 0–0.9)
MONOCYTES NFR BLD AUTO: 12.8 % — SIGNIFICANT CHANGE UP (ref 2–14)
NEUTROPHILS # BLD AUTO: 2.55 K/UL — SIGNIFICANT CHANGE UP (ref 1.8–7.4)
NEUTROPHILS NFR BLD AUTO: 64 % — SIGNIFICANT CHANGE UP (ref 43–77)
NRBC # BLD: 0 /100 WBCS — SIGNIFICANT CHANGE UP (ref 0–0)
PLATELET # BLD AUTO: 196 K/UL — SIGNIFICANT CHANGE UP (ref 150–400)
RBC # BLD: 4.37 M/UL — SIGNIFICANT CHANGE UP (ref 4.2–5.8)
RBC # FLD: 12.1 % — SIGNIFICANT CHANGE UP (ref 10.3–14.5)
WBC # BLD: 3.98 K/UL — SIGNIFICANT CHANGE UP (ref 3.8–10.5)
WBC # FLD AUTO: 3.98 K/UL — SIGNIFICANT CHANGE UP (ref 3.8–10.5)

## 2020-05-19 PROCEDURE — 99213 OFFICE O/P EST LOW 20 MIN: CPT

## 2020-05-19 NOTE — PHYSICAL EXAM
[Fully active, able to carry on all pre-disease performance without restriction] : Status 0 - Fully active, able to carry on all pre-disease performance without restriction [Normal] : affect appropriate [de-identified] : no edema

## 2020-05-19 NOTE — ASSESSMENT
[Palliative Care Plan] : not applicable at this time [FreeTextEntry1] : Mr Turk is seen in the office today along with . He reports he feels well. Urinary flow is better when he seated. He does not have any incontinence but does have urgency at times. There is no dysuria or hematuria. He voids about 5 or 6 times during the day and twice during the night. He reports no back pains or other areas of pains. His appetite is good his weight is stable. Review of systems is unremarkable.\par \par On physical examination, he appears well. There are no notable findings on physical exam.\par \par In 2017 he underwent a radical nephrectomy for [place] 8 tumor. His PSA was approximately 3o at the time of his surgery. He had one positive lymph node present. His PSA did not decline and began to rise in recent times. His PSA a few months after surgery was not undetectable. It was 1.69. It was 1.53 and October of 2019 and an increased to 3.74 and February 11. An auxumin PET scan revealed some uptake in the bone biopsy was performed which revealed no evidence of metastases in the iliac bone.\par \par His PSA will be checked today, and if it continues to rise, we will likely move towards reimaging. This may depend on the rate of rise, as the recent scans did not show any definite evidence of disease.\par \par All questions were answered the best of my ability and to his apparent satisfaction. I will see him once again in 3 months time.

## 2020-05-19 NOTE — REVIEW OF SYSTEMS
[Negative] : Gastrointestinal [Fever] : no fever [Chills] : no chills [Fatigue] : no fatigue [Recent Change In Weight] : ~T no recent weight change [Chest Pain] : no chest pain [Palpitations] : no palpitations [Lower Ext Edema] : no lower extremity edema [SOB on Exertion] : no shortness of breath during exertion [Cough] : no cough [Dysuria] : no dysuria [Incontinence] : no incontinence [Joint Pain] : no joint pain [Muscle Pain] : no muscle pain [Joint Stiffness] : no joint stiffness [Skin Rash] : no skin rash [Depression] : no depression [Dizziness] : no dizziness [Anxiety] : no anxiety [Easy Bruising] : no tendency for easy bruising [Muscle Weakness] : no muscle weakness [FreeTextEntry9] : no cramps [de-identified] : No HA, no paresthesias

## 2020-05-19 NOTE — REASON FOR VISIT
[Follow-Up Visit] : a follow-up [ Service] : provided by  Service [FreeTextEntry2] : Kim Hirschberger [TWNoteComboBox1] : American Sign Language

## 2020-05-19 NOTE — HISTORY OF PRESENT ILLNESS
[Disease: _____________________] : Disease: [unfilled] [T: ___] : T[unfilled] [N: ___] : N[unfilled] [Date: ____________] : Patient's last distress assessment performed on [unfilled]. [0 - No Distress] : Distress Level: 0 [Patient given social work contact information and resource sheet] : Patient was given social work contact information and resource sheet [de-identified] : John 4+4, tertiary pattern 5 at prostatectomy [de-identified] : Mr. Turk is seen in consultation on February 11, 2020. In 2017, he was found to have a markedly elevated PSA. PSA was 30.8. \par He had a history of prior negative prostate biopsy in (2015). He had an MRI performed which revealed clinically significant disease. 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 Tumor was grade group 5 involving 80% of one core. Another biopsy in the same zone was grade group for involving 25% of one core. The left apex showed adenocarcinoma of prostate, grade group 4 involving 95% and 30%, of 2 out of 2 cores. All other cores were negative. CT-guided core biopsy was performed of the right iliac bone, which did not reveal evidence of metastases. He underwent a radical prostatectomy in the latter part of 2017. Adenocarcinoma of the prostate, prognostic grade group for ([place] 4+4 equals 8) with a tertiary pattern 5 was present. Disease was confined to the gland. One regional lymph node of 3 resected was positive for tumor, measuring 9 mm in size.\par His PSA was not undetectable in the postoperative setting, and he was referred for radiation therapy. This was administered from January 22 of 2018 until March 16, 2018. The total dose was 7000 cGy. he received a 6 month Lupron injection post op, (12/12/17) planned for 3 years of treatment, but insurance changes and apparently denied. \par On January 7, 2020, he had a PSA of 2.64. He was referred for a PET scan, and referred for medical oncology consultation.\par PSA values that are available revealed 24.42 on March 28, 2017. On June 19, 2017 it was 28.86 in August 15, 2017 it was 30.84. Postoperatively, the PSA was 1.69 on December 5, 2017 and 1.06 on September 9, 2019. It was 1.53 on October 1, 2019 and 2.64 on January 7, 2020.\par He had a PET CT done, revealed no evidence of mets.\par Feels well, no pains noted. Urine flow is good, has incontinence. No blood, no dysuria. Nocturia x 2. Wears a pad during the day. He has been recommended a Cunningham clamp.  [de-identified] : Feels fine at this time, last seen 3 months ago. No pains. Urine flow is good, better sitting compared with standing. He has urgency, no incontinence. No blood, no dysuria. Nocturia 2-3 times, senses some frequency during the day. No back pains, no weakness. No edema of the legs. Appetite is normal, weight is stable. No fevers, nom chills, no recent infections. No fatigue. No cough, no GRAFF.

## 2020-05-20 LAB
ALBUMIN SERPL ELPH-MCNC: 4.3 G/DL
ALP BLD-CCNC: 58 U/L
ALT SERPL-CCNC: 26 U/L
ANION GAP SERPL CALC-SCNC: 11 MMOL/L
AST SERPL-CCNC: 23 U/L
BILIRUB SERPL-MCNC: 0.2 MG/DL
BUN SERPL-MCNC: 23 MG/DL
CALCIUM SERPL-MCNC: 9.5 MG/DL
CHLORIDE SERPL-SCNC: 104 MMOL/L
CO2 SERPL-SCNC: 28 MMOL/L
CREAT SERPL-MCNC: 1.14 MG/DL
GLUCOSE SERPL-MCNC: 117 MG/DL
POTASSIUM SERPL-SCNC: 4.6 MMOL/L
PROT SERPL-MCNC: 6.7 G/DL
PSA SERPL-MCNC: 0.04 NG/ML
SODIUM SERPL-SCNC: 143 MMOL/L

## 2020-06-30 NOTE — DISCHARGE NOTE ADULT - INFLUENZA VACCINE DATE
1.  I reassured the patient that his carotid duplex of 12/17/2019 showed only mild (< 50%) stenosis of both ICAs.  Vertebral arteries are normal.  There is mild calcification of both common carotid arteries.  2.  Repeat carotid duplex in 2022.  
BP is well controlled, continue losartan 50 mg daily, metoprolol succinate 50 mg daily, amlodipine 5 mg daily, torsemide 10 mg daily.  
Hyperlipidemia is controlled, continue atorvastatin 20 mg daily.  
Nov 01 2017

## 2020-07-01 NOTE — PROGRESS NOTE ADULT - PROBLEM SELECTOR PLAN 1
Detail Level: Generalized OOB  check labs  awaiting flatus  clears Detail Level: Detailed Detail Level: Simple

## 2020-08-11 NOTE — CONSULT LETTER
[Dear  ___] : Dear  [unfilled], [Courtesy Letter:] : I had the pleasure of seeing your patient, [unfilled], in my office today. [Please see my note below.] : Please see my note below. [Sincerely,] : Sincerely, [Consult Closing:] : Thank you very much for allowing me to participate in the care of this patient.  If you have any questions, please do not hesitate to contact me. [DrCheng  ___] : Dr. YEUNG

## 2020-08-13 ENCOUNTER — OUTPATIENT (OUTPATIENT)
Dept: OUTPATIENT SERVICES | Facility: HOSPITAL | Age: 65
LOS: 1 days | Discharge: HOME | End: 2020-08-13

## 2020-08-13 DIAGNOSIS — C61 MALIGNANT NEOPLASM OF PROSTATE: ICD-10-CM

## 2020-08-13 DIAGNOSIS — K40.90 UNILATERAL INGUINAL HERNIA, WITHOUT OBSTRUCTION OR GANGRENE, NOT SPECIFIED AS RECURRENT: Chronic | ICD-10-CM

## 2020-08-18 ENCOUNTER — APPOINTMENT (OUTPATIENT)
Dept: HEMATOLOGY ONCOLOGY | Facility: CLINIC | Age: 65
End: 2020-08-18
Payer: MEDICARE

## 2020-08-18 ENCOUNTER — APPOINTMENT (OUTPATIENT)
Dept: INFUSION THERAPY | Facility: HOSPITAL | Age: 65
End: 2020-08-18

## 2020-08-18 VITALS
HEIGHT: 71.5 IN | DIASTOLIC BLOOD PRESSURE: 86 MMHG | HEART RATE: 83 BPM | BODY MASS INDEX: 28.02 KG/M2 | OXYGEN SATURATION: 98 % | SYSTOLIC BLOOD PRESSURE: 138 MMHG | WEIGHT: 204.59 LBS | RESPIRATION RATE: 17 BRPM | TEMPERATURE: 97.7 F

## 2020-08-18 PROCEDURE — 99213 OFFICE O/P EST LOW 20 MIN: CPT

## 2020-08-18 RX ORDER — LEUPROLIDE ACETATE 30 MG
30 KIT INTRAMUSCULAR
Refills: 0 | Status: ACTIVE | COMMUNITY

## 2020-08-18 NOTE — ASSESSMENT
[Palliative Care Plan] : not applicable at this time [FreeTextEntry1] : Mr. Turk is seen in the office today in follow-up.   Milagros was present.  He reports that he feels well.  There were no pains.  He does not experience any hot flushes.  There is no fatigue.  His urinary flow is good, but he has incontinence with changes in positions and he wears a pad.  There is no hematuria or dysuria.  Nocturia occurs from 1-3 times per night.  He has not been ill at all.  There is no cough or shortness of breath.  He denies any edema.  His appetite is good and his weight is stable.\par \par On physical examination, he appears well.  There were no palpable lymph nodes present.  There is no spinal column or chest wall tenderness to palpation or percussion.  The heart examination is normal and the chest is clear.  There is no palpable abnormality upon examination of the abdomen.  The extremities are normal.\par \par In May, his PSA was down to 0.04.  Repeat blood work will be performed today including a COVID–19 antibody test at his request.  All questions were answered to the best of my ability and to his apparent satisfaction.  I will see him once again in 4 months time.  His Lupron injection today is being changed to a 4-month injection, as the  is having difficulty supplying the 3-month injections.  This was explained to him by the .\par \par I will see him once again in 4 months.  All questions were answered to the best of my ability and to his apparent satisfaction.

## 2020-08-18 NOTE — HISTORY OF PRESENT ILLNESS
[Disease: _____________________] : Disease: [unfilled] [T: ___] : T[unfilled] [N: ___] : N[unfilled] [de-identified] : Mr. Turk is seen in consultation on February 11, 2020. In 2017, he was found to have a markedly elevated PSA. PSA was 30.8. \par He had a history of prior negative prostate biopsy in (2015). He had an MRI performed which revealed clinically significant disease. 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 Tumor was grade group 5 involving 80% of one core. Another biopsy in the same zone was grade group for involving 25% of one core. The left apex showed adenocarcinoma of prostate, grade group 4 involving 95% and 30%, of 2 out of 2 cores. All other cores were negative. CT-guided core biopsy was performed of the right iliac bone, which did not reveal evidence of metastases. He underwent a radical prostatectomy in the latter part of 2017. Adenocarcinoma of the prostate, prognostic grade group for ([place] 4+4 equals 8) with a tertiary pattern 5 was present. Disease was confined to the gland. One regional lymph node of 3 resected was positive for tumor, measuring 9 mm in size.\par His PSA was not undetectable in the postoperative setting, and he was referred for radiation therapy. This was administered from January 22 of 2018 until March 16, 2018. The total dose was 7000 cGy. he received a 6 month Lupron injection post op, (12/12/17) planned for 3 years of treatment, but insurance changes and apparently denied. \par On January 7, 2020, he had a PSA of 2.64. He was referred for a PET scan, and referred for medical oncology consultation.\par PSA values that are available revealed 24.42 on March 28, 2017. On June 19, 2017 it was 28.86 in August 15, 2017 it was 30.84. Postoperatively, the PSA was 1.69 on December 5, 2017 and 1.06 on September 9, 2019. It was 1.53 on October 1, 2019 and 2.64 on January 7, 2020.\par He had a PET CT done, revealed no evidence of mets.\par Feels well, no pains noted. Urine flow is good, has incontinence. No blood, no dysuria. Nocturia x 2. Wears a pad during the day. He has been recommended a Cunningham clamp. \par \par 5/19/20...Feels fine at this time, last seen 3 months ago. No pains. Urine flow is good, better sitting compared with standing. He has urgency, no incontinence. No blood, no dysuria. Nocturia 2-3 times, senses some frequency during the day. No back pains, no weakness. No edema of the legs. Appetite is normal, weight is stable. No fevers, nom chills, no recent infections. No fatigue. No cough, no GRAFF.  [de-identified] : John 4+4, tertiary pattern 5 at prostatectomy [de-identified] : Feels well. No pains. No hot flushes. No fatigue. Urine flow is good. has incontinence with change in positions. wears a pad. No blood, no dysuria. Nocturia x 1-3 variable at times. he feels that if he eats protein for dinner he gets up more. No cough, No GRAFF, No edema. Appetite has been good, weight stable. No coronavirus infection. Lives with a roommate. Would like to have a COVID antibody test.

## 2020-08-18 NOTE — REVIEW OF SYSTEMS
[Hoarseness] : hoarseness [Loss of Hearing] : loss of hearing [Incontinence] : incontinence [Negative] : Eyes [Fever] : no fever [Chills] : no chills [Night Sweats] : no night sweats [Fatigue] : no fatigue [Recent Change In Weight] : ~T no recent weight change [Dysphagia] : no dysphagia [Odynophagia] : no odynophagia [Chest Pain] : no chest pain [Lower Ext Edema] : no lower extremity edema [Palpitations] : no palpitations [Cough] : no cough [SOB on Exertion] : no shortness of breath during exertion [Abdominal Pain] : no abdominal pain [Vomiting] : no vomiting [Diarrhea] : no diarrhea [Constipation] : no constipation [Dysuria] : no dysuria [Muscle Pain] : no muscle pain [Joint Stiffness] : no joint stiffness [Joint Pain] : no joint pain [Skin Rash] : no skin rash [Dizziness] : no dizziness [Anxiety] : no anxiety [Depression] : no depression [Hot Flashes] : no hot flashes [Muscle Weakness] : no muscle weakness [Easy Bleeding] : no tendency for easy bleeding [Easy Bruising] : no tendency for easy bruising [de-identified] : No HA, no paresthesias

## 2020-08-18 NOTE — PHYSICAL EXAM
[Fully active, able to carry on all pre-disease performance without restriction] : Status 0 - Fully active, able to carry on all pre-disease performance without restriction [Normal] : affect appropriate [de-identified] : no edema [de-identified] : no gynecomastia

## 2020-12-07 ENCOUNTER — LABORATORY RESULT (OUTPATIENT)
Age: 65
End: 2020-12-07

## 2020-12-09 ENCOUNTER — APPOINTMENT (OUTPATIENT)
Dept: UROLOGY | Facility: CLINIC | Age: 65
End: 2020-12-09

## 2020-12-10 ENCOUNTER — APPOINTMENT (OUTPATIENT)
Dept: UROLOGY | Facility: AMBULATORY SURGERY CENTER | Age: 65
End: 2020-12-10

## 2020-12-11 ENCOUNTER — OUTPATIENT (OUTPATIENT)
Dept: OUTPATIENT SERVICES | Facility: HOSPITAL | Age: 65
LOS: 1 days | Discharge: HOME | End: 2020-12-11

## 2020-12-11 DIAGNOSIS — K40.90 UNILATERAL INGUINAL HERNIA, WITHOUT OBSTRUCTION OR GANGRENE, NOT SPECIFIED AS RECURRENT: Chronic | ICD-10-CM

## 2020-12-15 ENCOUNTER — RESULT REVIEW (OUTPATIENT)
Age: 65
End: 2020-12-15

## 2020-12-15 ENCOUNTER — APPOINTMENT (OUTPATIENT)
Dept: HEMATOLOGY ONCOLOGY | Facility: CLINIC | Age: 65
End: 2020-12-15
Payer: MEDICARE

## 2020-12-15 ENCOUNTER — APPOINTMENT (OUTPATIENT)
Dept: INFUSION THERAPY | Facility: HOSPITAL | Age: 65
End: 2020-12-15

## 2020-12-15 VITALS
WEIGHT: 214.51 LBS | SYSTOLIC BLOOD PRESSURE: 143 MMHG | HEART RATE: 92 BPM | RESPIRATION RATE: 18 BRPM | HEIGHT: 72 IN | BODY MASS INDEX: 29.05 KG/M2 | OXYGEN SATURATION: 96 % | TEMPERATURE: 97.7 F | DIASTOLIC BLOOD PRESSURE: 88 MMHG

## 2020-12-15 PROCEDURE — 99213 OFFICE O/P EST LOW 20 MIN: CPT

## 2020-12-15 NOTE — REVIEW OF SYSTEMS
[Loss of Hearing] : loss of hearing [Incontinence] : incontinence [Negative] : Eyes [Fever] : no fever [Chills] : no chills [Fatigue] : no fatigue [Recent Change In Weight] : ~T no recent weight change [Dysphagia] : no dysphagia [Hoarseness] : no hoarseness [Odynophagia] : no odynophagia [Mucosal Pain] : no mucosal pain [Chest Pain] : no chest pain [Palpitations] : no palpitations [Lower Ext Edema] : no lower extremity edema [Cough] : no cough [SOB on Exertion] : no shortness of breath during exertion [Abdominal Pain] : no abdominal pain [Vomiting] : no vomiting [Constipation] : no constipation [Diarrhea] : no diarrhea [Dysuria] : no dysuria [Joint Pain] : no joint pain [Joint Stiffness] : no joint stiffness [Muscle Pain] : no muscle pain [Skin Rash] : no skin rash [Dizziness] : no dizziness [Anxiety] : no anxiety [Depression] : no depression [Hot Flashes] : no hot flashes [Muscle Weakness] : no muscle weakness [Easy Bleeding] : no tendency for easy bleeding [Easy Bruising] : no tendency for easy bruising [de-identified] : No HA, no parestheias

## 2020-12-15 NOTE — REASON FOR VISIT
[Follow-Up Visit] : a follow-up [ Service] : provided by  Service [FreeTextEntry2] : [place] [TWNoteComboBox1] : American Sign Language

## 2020-12-15 NOTE — PHYSICAL EXAM
[Fully active, able to carry on all pre-disease performance without restriction] : Status 0 - Fully active, able to carry on all pre-disease performance without restriction [Normal] : affect appropriate [de-identified] : no gynecomastia

## 2020-12-15 NOTE — ASSESSMENT
[Palliative Care Plan] : not applicable at this time [FreeTextEntry1] : Isaac is seen in the office today.  He is on Lupron monotherapy for metastatic prostate cancer.  He reports no pains.  His appetite is good and there is no weight loss.  There were no hot flushes.  He denies any fatigue.  His urinary stream is weak in his urine sprays.  He sits to void.  He has incontinence.  He has nocturia on occasion.  He wears a pad.  There is no hematuria or dysuria.  He denies edema.  There is no cough or shortness of breath.  There is no fevers or chills.  He is independent in his activities of daily living.\par \par His last PSA was from day with a value of 0.04.  He is to receive his Eligard injection today.  On physical examination, there are no significant notable findings.\par \par He was to have surgery with Dr. Metz, and the surgical procedure was canceled.  There was some bacteria in the urine and I explained to him that infection and the type of surgery he is to have would be a very serious complication.  There were less than 10,000 normal genitourinary mario present.  Plan is for him to have an artificial sphincter.  I pulled up an image of what an artificial sphincter is not explained to him.  He did not seem to have good understanding as to what the procedure was to be.\par \par I contacted Dr. Metz afterwards by email, and confirmed that this was the reason for the cancellation.\par \par All questions were answered to the best of my ability and to his apparent satisfaction.  I will see him once again in 3 months time.  He will receive Eligard once again at that time.

## 2020-12-15 NOTE — HISTORY OF PRESENT ILLNESS
[Disease: _____________________] : Disease: [unfilled] [T: ___] : T[unfilled] [N: ___] : N[unfilled] [de-identified] : Mr. Turk is seen in consultation on February 11, 2020. In 2017, he was found to have a markedly elevated PSA. PSA was 30.8. \par He had a history of prior negative prostate biopsy in (2015). He had an MRI performed which revealed clinically significant disease. 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 Tumor was grade group 5 involving 80% of one core. Another biopsy in the same zone was grade group for involving 25% of one core. The left apex showed adenocarcinoma of prostate, grade group 4 involving 95% and 30%, of 2 out of 2 cores. All other cores were negative. CT-guided core biopsy was performed of the right iliac bone, which did not reveal evidence of metastases. He underwent a radical prostatectomy in the latter part of 2017. Adenocarcinoma of the prostate, prognostic grade group for ([place] 4+4 equals 8) with a tertiary pattern 5 was present. Disease was confined to the gland. One regional lymph node of 3 resected was positive for tumor, measuring 9 mm in size.\par His PSA was not undetectable in the postoperative setting, and he was referred for radiation therapy. This was administered from January 22 of 2018 until March 16, 2018. The total dose was 7000 cGy. he received a 6 month Lupron injection post op, (12/12/17) planned for 3 years of treatment, but insurance changes and apparently denied. \par On January 7, 2020, he had a PSA of 2.64. He was referred for a PET scan, and referred for medical oncology consultation.\par PSA values that are available revealed 24.42 on March 28, 2017. On June 19, 2017 it was 28.86 in August 15, 2017 it was 30.84. Postoperatively, the PSA was 1.69 on December 5, 2017 and 1.06 on September 9, 2019. It was 1.53 on October 1, 2019 and 2.64 on January 7, 2020.\par He had a PET CT done, revealed no evidence of mets.\par Feels well, no pains noted. Urine flow is good, has incontinence. No blood, no dysuria. Nocturia x 2. Wears a pad during the day. He has been recommended a Cunningham clamp. \par \par 5/19/20...Feels fine at this time, last seen 3 months ago. No pains. Urine flow is good, better sitting compared with standing. He has urgency, no incontinence. No blood, no dysuria. Nocturia 2-3 times, senses some frequency during the day. No back pains, no weakness. No edema of the legs. Appetite is normal, weight is stable. No fevers, nom chills, no recent infections. No fatigue. No cough, no GRAFF. \par \par 8/18/20...Feels well. No pains. No hot flushes. No fatigue. Urine flow is good. has incontinence with change in positions. wears a pad. No blood, no dysuria. Nocturia x 1-3 variable at times. he feels that if he eats protein for dinner he gets up more. No cough, No GRAFF, No edema. Appetite has been good, weight stable. No coronavirus infection. Lives with a roommate. Would like to have a COVID antibody test.  [de-identified] : John 4+4, tertiary pattern 5 at prostatectomy [de-identified] : Due Eligard today. , live. He was seen by Dr Metz via video link. I believe this as for an artificial sphincter discussion, but he does not have good understanding. No pains. Appetite is good, no weight loss. No hot flushes. No fatigue. Urine stream is weak and urine sprays, has to sit to void. Nocturia  occasionally. Wears a pad. NO blood, no dysuria. No edema. No pains, no cough, no dyspnea. No fevers, no chills. Independent in [place].

## 2020-12-16 LAB
ALBUMIN SERPL ELPH-MCNC: 4.1 G/DL
ALP BLD-CCNC: 71 U/L
ALT SERPL-CCNC: 23 U/L
ANION GAP SERPL CALC-SCNC: 13 MMOL/L
AST SERPL-CCNC: 26 U/L
BILIRUB SERPL-MCNC: 0.2 MG/DL
BUN SERPL-MCNC: 24 MG/DL
CALCIUM SERPL-MCNC: 9.7 MG/DL
CHLORIDE SERPL-SCNC: 104 MMOL/L
CO2 SERPL-SCNC: 23 MMOL/L
CREAT SERPL-MCNC: 1.11 MG/DL
GLUCOSE SERPL-MCNC: 100 MG/DL
POTASSIUM SERPL-SCNC: 4.7 MMOL/L
PROT SERPL-MCNC: 6.7 G/DL
PSA SERPL-MCNC: <0.01 NG/ML
SODIUM SERPL-SCNC: 140 MMOL/L

## 2021-01-07 ENCOUNTER — APPOINTMENT (OUTPATIENT)
Dept: UROLOGY | Facility: CLINIC | Age: 66
End: 2021-01-07
Payer: MEDICARE

## 2021-01-07 VITALS — SYSTOLIC BLOOD PRESSURE: 146 MMHG | TEMPERATURE: 97.6 F | HEART RATE: 108 BPM | DIASTOLIC BLOOD PRESSURE: 96 MMHG

## 2021-01-07 LAB
BILIRUB UR QL STRIP: NORMAL
CLARITY UR: NORMAL
COLLECTION METHOD: NORMAL
GLUCOSE UR-MCNC: NORMAL
HCG UR QL: 0.2 EU/DL
HGB UR QL STRIP.AUTO: NORMAL
KETONES UR-MCNC: NORMAL
LEUKOCYTE ESTERASE UR QL STRIP: NORMAL
NITRITE UR QL STRIP: NORMAL
PH UR STRIP: 5.5
PROT UR STRIP-MCNC: NORMAL
SP GR UR STRIP: 1.02

## 2021-01-07 PROCEDURE — 99214 OFFICE O/P EST MOD 30 MIN: CPT | Mod: 25

## 2021-01-07 PROCEDURE — 81003 URINALYSIS AUTO W/O SCOPE: CPT | Mod: QW

## 2021-01-11 LAB — BACTERIA UR CULT: NORMAL

## 2021-01-25 ENCOUNTER — TRANSCRIPTION ENCOUNTER (OUTPATIENT)
Age: 66
End: 2021-01-25

## 2021-01-26 ENCOUNTER — APPOINTMENT (OUTPATIENT)
Dept: UROLOGY | Facility: AMBULATORY SURGERY CENTER | Age: 66
End: 2021-01-26

## 2021-01-26 ENCOUNTER — OUTPATIENT (OUTPATIENT)
Dept: OUTPATIENT SERVICES | Facility: HOSPITAL | Age: 66
LOS: 1 days | Discharge: HOME | End: 2021-01-26
Payer: MEDICARE

## 2021-01-26 DIAGNOSIS — K40.90 UNILATERAL INGUINAL HERNIA, WITHOUT OBSTRUCTION OR GANGRENE, NOT SPECIFIED AS RECURRENT: Chronic | ICD-10-CM

## 2021-01-26 PROCEDURE — 53445 INSERT URO/VES NCK SPHINCTER: CPT

## 2021-01-26 RX ORDER — OXYCODONE AND ACETAMINOPHEN 5; 325 MG/1; MG/1
1 TABLET ORAL
Qty: 10 | Refills: 0
Start: 2021-01-26

## 2021-02-03 ENCOUNTER — APPOINTMENT (OUTPATIENT)
Dept: UROLOGY | Facility: CLINIC | Age: 66
End: 2021-02-03
Payer: MEDICARE

## 2021-02-03 VITALS — TEMPERATURE: 94.3 F

## 2021-02-03 PROCEDURE — 99024 POSTOP FOLLOW-UP VISIT: CPT

## 2021-02-03 NOTE — HISTORY OF PRESENT ILLNESS
[FreeTextEntry1] : 65-year-old gentleman who is 1 week status post artificial urinary sphincter placement.  He denies pain.  He is still having incontinence which is to be expected.  All of his incisions are healing well.  He states he has minimal bruising and swelling.\par \par On physical exam, incisions are healing well.  All dressings were removed.  Pump was easily palpable in the right hemiscrotum.  The pump was deactivated.PVR - 0 ml. \par \par Plan:\par Activity restrictions reviewed with the patient at length.  He will follow-up in a month for activation of his artificial urinary sphincter.

## 2021-03-05 ENCOUNTER — APPOINTMENT (OUTPATIENT)
Dept: UROLOGY | Facility: CLINIC | Age: 66
End: 2021-03-05
Payer: MEDICARE

## 2021-03-05 ENCOUNTER — OUTPATIENT (OUTPATIENT)
Dept: OUTPATIENT SERVICES | Facility: HOSPITAL | Age: 66
LOS: 1 days | Discharge: HOME | End: 2021-03-05

## 2021-03-05 DIAGNOSIS — K40.90 UNILATERAL INGUINAL HERNIA, WITHOUT OBSTRUCTION OR GANGRENE, NOT SPECIFIED AS RECURRENT: Chronic | ICD-10-CM

## 2021-03-05 DIAGNOSIS — C61 MALIGNANT NEOPLASM OF PROSTATE: ICD-10-CM

## 2021-03-05 PROCEDURE — 99024 POSTOP FOLLOW-UP VISIT: CPT

## 2021-03-05 NOTE — HISTORY OF PRESENT ILLNESS
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  He is doing well, denies pain.  Please note we used a  to carry out the appointment.\par \par On exam, his pump is in good position and easily palpated  It is easily activated and cycled.  The patient then demonstrated his ability to utilize the artificial urinary sphincter as well as the pump without any difficulty.  All incisions are well-healed.\par \par Plan:\par He may resume all activities as discussed previously.  He will follow-up in 6 months.

## 2021-03-10 ENCOUNTER — APPOINTMENT (OUTPATIENT)
Dept: INFUSION THERAPY | Facility: HOSPITAL | Age: 66
End: 2021-03-10

## 2021-03-10 ENCOUNTER — RESULT REVIEW (OUTPATIENT)
Age: 66
End: 2021-03-10

## 2021-03-10 ENCOUNTER — APPOINTMENT (OUTPATIENT)
Dept: HEMATOLOGY ONCOLOGY | Facility: CLINIC | Age: 66
End: 2021-03-10
Payer: MEDICARE

## 2021-03-10 VITALS
DIASTOLIC BLOOD PRESSURE: 87 MMHG | RESPIRATION RATE: 14 BRPM | SYSTOLIC BLOOD PRESSURE: 142 MMHG | OXYGEN SATURATION: 97 % | HEART RATE: 83 BPM | BODY MASS INDEX: 29.12 KG/M2 | WEIGHT: 217.35 LBS | HEIGHT: 72.52 IN | TEMPERATURE: 97.7 F

## 2021-03-10 LAB
BASOPHILS # BLD AUTO: 0.01 K/UL — SIGNIFICANT CHANGE UP (ref 0–0.2)
BASOPHILS NFR BLD AUTO: 0.2 % — SIGNIFICANT CHANGE UP (ref 0–2)
EOSINOPHIL # BLD AUTO: 0.08 K/UL — SIGNIFICANT CHANGE UP (ref 0–0.5)
EOSINOPHIL NFR BLD AUTO: 1.8 % — SIGNIFICANT CHANGE UP (ref 0–6)
HCT VFR BLD CALC: 41.2 % — SIGNIFICANT CHANGE UP (ref 39–50)
HGB BLD-MCNC: 13.5 G/DL — SIGNIFICANT CHANGE UP (ref 13–17)
IMM GRANULOCYTES NFR BLD AUTO: 0.5 % — SIGNIFICANT CHANGE UP (ref 0–1.5)
LYMPHOCYTES # BLD AUTO: 1.1 K/UL — SIGNIFICANT CHANGE UP (ref 1–3.3)
LYMPHOCYTES # BLD AUTO: 24.9 % — SIGNIFICANT CHANGE UP (ref 13–44)
MCHC RBC-ENTMCNC: 30.6 PG — SIGNIFICANT CHANGE UP (ref 27–34)
MCHC RBC-ENTMCNC: 32.8 G/DL — SIGNIFICANT CHANGE UP (ref 32–36)
MCV RBC AUTO: 93.4 FL — SIGNIFICANT CHANGE UP (ref 80–100)
MONOCYTES # BLD AUTO: 0.52 K/UL — SIGNIFICANT CHANGE UP (ref 0–0.9)
MONOCYTES NFR BLD AUTO: 11.8 % — SIGNIFICANT CHANGE UP (ref 2–14)
NEUTROPHILS # BLD AUTO: 2.69 K/UL — SIGNIFICANT CHANGE UP (ref 1.8–7.4)
NEUTROPHILS NFR BLD AUTO: 60.8 % — SIGNIFICANT CHANGE UP (ref 43–77)
NRBC # BLD: 0 /100 WBCS — SIGNIFICANT CHANGE UP (ref 0–0)
PLATELET # BLD AUTO: 214 K/UL — SIGNIFICANT CHANGE UP (ref 150–400)
RBC # BLD: 4.41 M/UL — SIGNIFICANT CHANGE UP (ref 4.2–5.8)
RBC # FLD: 11.9 % — SIGNIFICANT CHANGE UP (ref 10.3–14.5)
WBC # BLD: 4.42 K/UL — SIGNIFICANT CHANGE UP (ref 3.8–10.5)
WBC # FLD AUTO: 4.42 K/UL — SIGNIFICANT CHANGE UP (ref 3.8–10.5)

## 2021-03-10 PROCEDURE — 99213 OFFICE O/P EST LOW 20 MIN: CPT

## 2021-03-10 NOTE — ASSESSMENT
[Palliative Care Plan] : not applicable at this time [FreeTextEntry1] : Mr. Turk is seen in the office in follow-up.  He underwent a radical prostatectomy in the past followed by radiation therapy.  His tumor was [place] 9.  Since his last visit, he has had an artificial sphincter revision in January.  Urinary flow is better at this time and he says that he has full control.  Nocturia is less now at 2 or 3 times per night where it was previously 5 times per night.  There is no dysuria or hematuria.  His appetite is good and there is no weight loss.  He gained 3 pounds from his last visit.  He has not received the coronavirus vaccine as yet as he has been having difficulty in getting an appointment.  There are no respiratory complaints.  There were no GI complaints.  There is no fevers or chills.  He has no bone pains.  There were no hot flushes.  He is due for his Eligard today.  He reports that he had a colonoscopy and EGD without any significant issues recently.\par \par The entire visit was conducted with a video .\par \par On physical examination, he appears well.  His performance status is 0.  On cardiac examination, he seems to have an opening snap at the apex which radiates into the axilla.  There is no murmur.  I asked if he had an echocardiogram before, but there was some confusion and he told me he had a study done.  However this was an EKG not an echo.  He has some trace edema of the ankles.\par \par Today's CBC was normal.  Chemistry panel and PSA have not been reported as yet.  I will contact him once those values are available.  All questions were answered to the best of my ability and to his apparent satisfaction.  He gave me the name of his  primary care doctor so included him as a recipient of this letter.  He will be seen once again in 3 months time for his next Eligard injection along with an appointment with me.\par \par \par Reji Cardoso PCP

## 2021-03-10 NOTE — REASON FOR VISIT
[Follow-Up Visit] : a follow-up [ Service] : provided by  Service [FreeTextEntry1] : [id number] [FreeTextEntry2] : Neda [FreeTextEntry3] : video  for ASL [TWNoteComboBox1] : American Sign Language

## 2021-03-10 NOTE — REVIEW OF SYSTEMS
[Loss of Hearing] : loss of hearing [Negative] : Gastrointestinal [Fever] : no fever [Chills] : no chills [Fatigue] : no fatigue [Recent Change In Weight] : ~T no recent weight change [Dysphagia] : no dysphagia [Odynophagia] : no odynophagia [Mucosal Pain] : no mucosal pain [Chest Pain] : no chest pain [Palpitations] : no palpitations [Lower Ext Edema] : no lower extremity edema [Cough] : no cough [SOB on Exertion] : no shortness of breath during exertion [Dysuria] : no dysuria [Incontinence] : no incontinence [Joint Pain] : no joint pain [Joint Stiffness] : no joint stiffness [Muscle Pain] : no muscle pain [Muscle Weakness] : no muscle weakness [Skin Rash] : no skin rash [Dizziness] : no dizziness [Anxiety] : no anxiety [Depression] : no depression [Hot Flashes] : no hot flashes [Easy Bleeding] : no tendency for easy bleeding [Easy Bruising] : no tendency for easy bruising [de-identified] : No HA, no paresthesias

## 2021-03-10 NOTE — HISTORY OF PRESENT ILLNESS
[Disease: _____________________] : Disease: [unfilled] [T: ___] : T[unfilled] [N: ___] : N[unfilled] [AJCC Stage: ____] : AJCC Stage: [unfilled] [de-identified] : Mr. Turk is seen in consultation on February 11, 2020. In 2017, he was found to have a markedly elevated PSA. PSA was 30.8. \par He had a history of prior negative prostate biopsy in (2015). He had an MRI performed which revealed clinically significant disease. 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 Tumor was grade group 5 involving 80% of one core. Another biopsy in the same zone was grade group for involving 25% of one core. The left apex showed adenocarcinoma of prostate, grade group 4 involving 95% and 30%, of 2 out of 2 cores. All other cores were negative. CT-guided core biopsy was performed of the right iliac bone, which did not reveal evidence of metastases. He underwent a radical prostatectomy in the latter part of 2017. Adenocarcinoma of the prostate, prognostic grade group for ([place] 4+4 equals 8) with a tertiary pattern 5 was present. Disease was confined to the gland. One regional lymph node of 3 resected was positive for tumor, measuring 9 mm in size.\par His PSA was not undetectable in the postoperative setting, and he was referred for radiation therapy. This was administered from January 22 of 2018 until March 16, 2018. The total dose was 7000 cGy. he received a 6 month Lupron injection post op, (12/12/17) planned for 3 years of treatment, but insurance changes and apparently denied. \par On January 7, 2020, he had a PSA of 2.64. He was referred for a PET scan, and referred for medical oncology consultation.\par PSA values that are available revealed 24.42 on March 28, 2017. On June 19, 2017 it was 28.86 in August 15, 2017 it was 30.84. Postoperatively, the PSA was 1.69 on December 5, 2017 and 1.06 on September 9, 2019. It was 1.53 on October 1, 2019 and 2.64 on January 7, 2020.\par He had a PET CT done, revealed no evidence of mets.\par Feels well, no pains noted. Urine flow is good, has incontinence. No blood, no dysuria. Nocturia x 2. Wears a pad during the day. He has been recommended a Cunningham clamp. \par \par 5/19/20...Feels fine at this time, last seen 3 months ago. No pains. Urine flow is good, better sitting compared with standing. He has urgency, no incontinence. No blood, no dysuria. Nocturia 2-3 times, senses some frequency during the day. No back pains, no weakness. No edema of the legs. Appetite is normal, weight is stable. No fevers, nom chills, no recent infections. No fatigue. No cough, no GRAFF. \par \par 8/18/20...Feels well. No pains. No hot flushes. No fatigue. Urine flow is good. has incontinence with change in positions. wears a pad. No blood, no dysuria. Nocturia x 1-3 variable at times. he feels that if he eats protein for dinner he gets up more. No cough, No GRAFF, No edema. Appetite has been good, weight stable. No coronavirus infection. Lives with a roommate. Would like to have a COVID antibody test. \par \par 12/15/20...Due Eligard today. , live. He was seen by Dr Metz via video link. I believe this as for an artificial sphincter discussion, but he does not have good understanding. No pains. Appetite is good, no weight loss. No hot flushes. No fatigue. Urine stream is weak and urine sprays, has to sit to void. Nocturia  occasionally. Wears a pad. NO blood, no dysuria. No edema. No pains, no cough, no dyspnea. No fevers, no chills. Independent in ADls.  [de-identified] : John 4+4, tertiary pattern 5 at prostatectomy [de-identified] : radical prostatectomy followed by RT for John 9 prostate cancer, 2017. he had an artificial sphincter revision in January. Flow is better, with full control at this time. Nocturia is less, now at 2-3, prior 5. No dysuria. no blood. Appetite is good, no weight loss. Weight is up 3 pounds form last visit. No coronavirus vaccine as yet, unable to get an appt. No edema.no cough, no GRAFF. No fevers, no chills. No pains in the bones. No hot flushes. getting his Eligard today. No N/V/D/C. had colonoscopy and EGD, no issues.

## 2021-03-10 NOTE — CONSULT LETTER
[Dear  ___] : Dear  [unfilled], [Courtesy Letter:] : I had the pleasure of seeing your patient, [unfilled], in my office today. [Please see my note below.] : Please see my note below. [Consult Closing:] : Thank you very much for allowing me to participate in the care of this patient.  If you have any questions, please do not hesitate to contact me. [Sincerely,] : Sincerely, [DrCheng  ___] : Dr. YEUNG [DrCheng ___] : Dr. YEUNG

## 2021-03-10 NOTE — PHYSICAL EXAM
[Fully active, able to carry on all pre-disease performance without restriction] : Status 0 - Fully active, able to carry on all pre-disease performance without restriction [Normal] : affect appropriate [de-identified] : RRR, opening snap at apex, radiates to axilla [de-identified] : trace edema of the ankles

## 2021-03-11 ENCOUNTER — APPOINTMENT (OUTPATIENT)
Dept: HEMATOLOGY ONCOLOGY | Facility: CLINIC | Age: 66
End: 2021-03-11
Payer: MEDICARE

## 2021-03-11 LAB
ALBUMIN SERPL ELPH-MCNC: 4.2 G/DL
ALP BLD-CCNC: 73 U/L
ALT SERPL-CCNC: 19 U/L
ANION GAP SERPL CALC-SCNC: 10 MMOL/L
AST SERPL-CCNC: 22 U/L
BILIRUB SERPL-MCNC: 0.3 MG/DL
BUN SERPL-MCNC: 18 MG/DL
CALCIUM SERPL-MCNC: 9.6 MG/DL
CHLORIDE SERPL-SCNC: 104 MMOL/L
CO2 SERPL-SCNC: 28 MMOL/L
CREAT SERPL-MCNC: 1.22 MG/DL
GLUCOSE SERPL-MCNC: 108 MG/DL
POTASSIUM SERPL-SCNC: 4.5 MMOL/L
PROT SERPL-MCNC: 6.8 G/DL
PSA SERPL-MCNC: <0.01 NG/ML
SODIUM SERPL-SCNC: 142 MMOL/L

## 2021-06-07 ENCOUNTER — OUTPATIENT (OUTPATIENT)
Dept: OUTPATIENT SERVICES | Facility: HOSPITAL | Age: 66
LOS: 1 days | Discharge: HOME | End: 2021-06-07

## 2021-06-07 DIAGNOSIS — C61 MALIGNANT NEOPLASM OF PROSTATE: ICD-10-CM

## 2021-06-07 DIAGNOSIS — K40.90 UNILATERAL INGUINAL HERNIA, WITHOUT OBSTRUCTION OR GANGRENE, NOT SPECIFIED AS RECURRENT: Chronic | ICD-10-CM

## 2021-06-09 ENCOUNTER — RESULT REVIEW (OUTPATIENT)
Age: 66
End: 2021-06-09

## 2021-06-09 ENCOUNTER — APPOINTMENT (OUTPATIENT)
Dept: HEMATOLOGY ONCOLOGY | Facility: CLINIC | Age: 66
End: 2021-06-09
Payer: MEDICARE

## 2021-06-09 ENCOUNTER — APPOINTMENT (OUTPATIENT)
Dept: INFUSION THERAPY | Facility: HOSPITAL | Age: 66
End: 2021-06-09

## 2021-06-09 VITALS
WEIGHT: 216.71 LBS | HEART RATE: 89 BPM | RESPIRATION RATE: 16 BRPM | TEMPERATURE: 97.2 F | HEIGHT: 72.44 IN | SYSTOLIC BLOOD PRESSURE: 120 MMHG | DIASTOLIC BLOOD PRESSURE: 90 MMHG | OXYGEN SATURATION: 98 % | BODY MASS INDEX: 29.03 KG/M2

## 2021-06-09 LAB
BASOPHILS # BLD AUTO: 0.02 K/UL — SIGNIFICANT CHANGE UP (ref 0–0.2)
BASOPHILS NFR BLD AUTO: 0.4 % — SIGNIFICANT CHANGE UP (ref 0–2)
EOSINOPHIL # BLD AUTO: 0.08 K/UL — SIGNIFICANT CHANGE UP (ref 0–0.5)
EOSINOPHIL NFR BLD AUTO: 1.8 % — SIGNIFICANT CHANGE UP (ref 0–6)
HCT VFR BLD CALC: 41.7 % — SIGNIFICANT CHANGE UP (ref 39–50)
HGB BLD-MCNC: 13.5 G/DL — SIGNIFICANT CHANGE UP (ref 13–17)
IMM GRANULOCYTES NFR BLD AUTO: 0.4 % — SIGNIFICANT CHANGE UP (ref 0–1.5)
LYMPHOCYTES # BLD AUTO: 1.31 K/UL — SIGNIFICANT CHANGE UP (ref 1–3.3)
LYMPHOCYTES # BLD AUTO: 28.9 % — SIGNIFICANT CHANGE UP (ref 13–44)
MCHC RBC-ENTMCNC: 30 PG — SIGNIFICANT CHANGE UP (ref 27–34)
MCHC RBC-ENTMCNC: 32.4 G/DL — SIGNIFICANT CHANGE UP (ref 32–36)
MCV RBC AUTO: 92.7 FL — SIGNIFICANT CHANGE UP (ref 80–100)
MONOCYTES # BLD AUTO: 0.52 K/UL — SIGNIFICANT CHANGE UP (ref 0–0.9)
MONOCYTES NFR BLD AUTO: 11.5 % — SIGNIFICANT CHANGE UP (ref 2–14)
NEUTROPHILS # BLD AUTO: 2.59 K/UL — SIGNIFICANT CHANGE UP (ref 1.8–7.4)
NEUTROPHILS NFR BLD AUTO: 57 % — SIGNIFICANT CHANGE UP (ref 43–77)
NRBC # BLD: 0 /100 WBCS — SIGNIFICANT CHANGE UP (ref 0–0)
PLATELET # BLD AUTO: 190 K/UL — SIGNIFICANT CHANGE UP (ref 150–400)
PSA SERPL-MCNC: <0.01 NG/ML
RBC # BLD: 4.5 M/UL — SIGNIFICANT CHANGE UP (ref 4.2–5.8)
RBC # FLD: 12.3 % — SIGNIFICANT CHANGE UP (ref 10.3–14.5)
WBC # BLD: 4.54 K/UL — SIGNIFICANT CHANGE UP (ref 3.8–10.5)
WBC # FLD AUTO: 4.54 K/UL — SIGNIFICANT CHANGE UP (ref 3.8–10.5)

## 2021-06-09 PROCEDURE — 99213 OFFICE O/P EST LOW 20 MIN: CPT

## 2021-06-09 NOTE — ASSESSMENT
[Palliative Care Plan] : not applicable at this time [FreeTextEntry1] : Isaac Turk is seen in the office today in follow-up.  Milagros Keller served as an .  He was first seen by me in February 2020.  His PSA was 31 at the time of diagnosis in 2017.  He had a negative biopsy in 2015.  A targeted biopsy revealed John 9 in the left mid zone.  He underwent a prostatectomy in the latter part of 2017 with [place] 8, tertiary 5 pattern present 1 lymph node was positive for tumor.  His PSA increased in the postoperative setting, and he received radiation therapy.  A Frazier Schoeman PET scan was negative in February 2020.  He has been on androgen deprivation therapy, and his PSA has been undetectable.\par \par He reports that he feels well.  He is seeing his cardiologist next week for checkup, but there are no current issues.  He had a revision of his artificial sphincter, and he is happy with the results.  Urinary flow is good.  He does not have any incontinence at this time.  There is no dysuria or hematuria.  He has no incontinence.  He gets up anywhere from 0-3 times per night to empty his bladder.  He has some arthritic pains in the knees which have been present for several years.  He notes these mostly when he seated, and they do not bother him when he is active and walking.  Does not take any medications for it.  He does not have any hot flushes.  He has no edema, chest pain or chest pressure and there are no palpitations.  His appetite is good and his weight is stable.  He received his coronavirus vaccine.\par \par On physical examination, he appears well and in no acute distress.  His performance status is 0.  There is no palpable adenopathy present.  There is no spinal column or chest wall tenderness to palpation or percussion.  Extremities are normal.  The heart examination is normal and the lungs are clear.\par \par His PSA was 3.74 in February 2020 when he was started on androgen deprivation therapy.  The PSA declined to 0.04 in May 2020 and was undetectable in December 2020 in March 2021.\par \par Today's PSA is pending.  Baseline DEXA scan should be  considered and discussed at the time of his next visit.  All questions were answered to the best of my ability and to his apparent satisfaction.  I will see him once again in 3 months.  He received his Eligard injection today and will have another one in 3 months.

## 2021-06-09 NOTE — HISTORY OF PRESENT ILLNESS
[Disease: _____________________] : Disease: [unfilled] [T: ___] : T[unfilled] [N: ___] : N[unfilled] [AJCC Stage: ____] : AJCC Stage: [unfilled] [de-identified] : Mr. Turk is seen in consultation on February 11, 2020. In 2017, he was found to have a markedly elevated PSA. PSA was 30.8. \par He had a history of prior negative prostate biopsy in (2015). He had an MRI performed which revealed clinically significant disease. 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 Tumor was grade group 5 involving 80% of one core. Another biopsy in the same zone was grade group for involving 25% of one core. The left apex showed adenocarcinoma of prostate, grade group 4 involving 95% and 30%, of 2 out of 2 cores. All other cores were negative. CT-guided core biopsy was performed of the right iliac bone, which did not reveal evidence of metastases. He underwent a radical prostatectomy in the latter part of 2017. Adenocarcinoma of the prostate, prognostic grade group for ([place] 4+4 equals 8) with a tertiary pattern 5 was present. Disease was confined to the gland. One regional lymph node of 3 resected was positive for tumor, measuring 9 mm in size.\par His PSA was not undetectable in the postoperative setting, and he was referred for radiation therapy. This was administered from January 22 of 2018 until March 16, 2018. The total dose was 7000 cGy. he received a 6 month Lupron injection post op, (12/12/17) planned for 3 years of treatment, but insurance changes and apparently denied. \par On January 7, 2020, he had a PSA of 2.64. He was referred for a PET scan, and referred for medical oncology consultation.\par PSA values that are available revealed 24.42 on March 28, 2017. On June 19, 2017 it was 28.86 in August 15, 2017 it was 30.84. Postoperatively, the PSA was 1.69 on December 5, 2017 and 1.06 on September 9, 2019. It was 1.53 on October 1, 2019 and 2.64 on January 7, 2020.\par He had a PET CT done, revealed no evidence of mets.\par Feels well, no pains noted. Urine flow is good, has incontinence. No blood, no dysuria. Nocturia x 2. Wears a pad during the day. He has been recommended a Cunningham clamp. \par \par 5/19/20...Feels fine at this time, last seen 3 months ago. No pains. Urine flow is good, better sitting compared with standing. He has urgency, no incontinence. No blood, no dysuria. Nocturia 2-3 times, senses some frequency during the day. No back pains, no weakness. No edema of the legs. Appetite is normal, weight is stable. No fevers, nom chills, no recent infections. No fatigue. No cough, no GRAFF. \par \par 8/18/20...Feels well. No pains. No hot flushes. No fatigue. Urine flow is good. has incontinence with change in positions. wears a pad. No blood, no dysuria. Nocturia x 1-3 variable at times. he feels that if he eats protein for dinner he gets up more. No cough, No GRAFF, No edema. Appetite has been good, weight stable. No coronavirus infection. Lives with a roommate. Would like to have a COVID antibody test. \par \par 12/15/20...Due Eligard today. , live. He was seen by Dr Metz via video link. I believe this as for an artificial sphincter discussion, but he does not have good understanding. No pains. Appetite is good, no weight loss. No hot flushes. No fatigue. Urine stream is weak and urine sprays, has to sit to void. Nocturia  occasionally. Wears a pad. NO blood, no dysuria. No edema. No pains, no cough, no dyspnea. No fevers, no chills. Independent in ADls. \par \par 3/10/21 - radical prostatectomy followed by RT for [place] 9 prostate cancer, 2017. he had an artificial sphincter revision in January. Flow is better, with full control at this time. Nocturia is less, now at 2-3, prior 5. No dysuria. no blood. Appetite is good, no weight loss. Weight is up 3 pounds form last visit. No coronavirus vaccine as yet, unable to get an appt. No edema.no cough, no GRAFF. No fevers, no chills. No pains in the bones. No hot flushes. getting his Eligard today. No N/V/D/C. had colonoscopy and EGD, no issues.  [de-identified] : John 4+4, tertiary pattern 5 at prostatectomy [de-identified] : 6/9/21 - [facility] today. Feels well. seeing cardiologist next week for a check up, no issues. Has some knee arthritis, mostly when he is seated, not an issue with walking. Present gradually since 2013. NO meds used. Had CV vaccine. No hot flushes from the [facility]. No edema. no chest pains/palpitations. no chest pressure. Appetite is good, weight is stable. Urine flow is good, Has a new artificial sphincter, with no issues at all. No dysuria, no blood. No incontinence. Nocturia variable.

## 2021-06-09 NOTE — RESULTS/DATA
[FreeTextEntry1] : EXAM: PETCT SKUL THI AXUMIN INGAVINO \par PROCEDURE DATE: 01/31/2020 \par INTERPRETATION: PET/CT TUMOR IMAGING - FLUCICLOVINE \par Indication: Prostate cancer. Restaging to help determine subsequent treatment strategy. \par Procedure: Intravenous access was established and the patient was placed on the imaging table. He was then injected with 8.59 mCi of fluciclovine and \par PET/CT imaging was performed approximately 5 minutes later with the standard protocol from the mid thighs to the base of the skull. The CT scan is a low \par dose protocol with images used for attenuation correction and localization only. \par PET images were reconstructed in axial, sagittal and coronal planes using CT based attenuation correction. Images were displayed as PET, CT and fused \par data sets as well as maximum intensity pixel projections. \par FINDINGS: A normal amount of activity is seen in the liver, pancreas and bone marrow. A small amount of activity is also seen in the bowel. \par No abnormalities are seen in the pelvis. Specifically, no abnormal hany uptake is identified. \par Two areas of focal activity are seen, one in each inguinal region associated with normal sized lymph nodes. Activity on the left side is slightly greater \par than that on the right and in each case in individual lymph node contains all of the activity, but because of their symmetry, the normal appearance of \par the nodes and their small size, metastatic disease is not strongly suspected. \par IMPRESSION: No clear evidence of metastatic disease is identified. A small amount of activity is seen in a normal sized lymph node in each inguinal region. \par Thank you for the opportunity to participate in the care of this patient. \par ALEXUS MANCILLA M.D., ATTENDING RADIOLOGIST \par This document has been electronically signed. Feb 3 2020 12:43PM

## 2021-06-09 NOTE — REASON FOR VISIT
[Follow-Up Visit] : a follow-up [Other: ______] : provided by ANJANA [FreeTextEntry2] : Milagros Keller [FreeTextEntry4] : [facility] [TWNoteComboBox1] : American Sign Language

## 2021-06-09 NOTE — PHYSICAL EXAM
[Fully active, able to carry on all pre-disease performance without restriction] : Status 0 - Fully active, able to carry on all pre-disease performance without restriction [Normal] : affect appropriate [de-identified] : no edema

## 2021-06-09 NOTE — REVIEW OF SYSTEMS
[Loss of Hearing] : loss of hearing [Joint Pain] : joint pain [Fever] : no fever [Chills] : no chills [Fatigue] : no fatigue [Recent Change In Weight] : ~T no recent weight change [Dysphagia] : no dysphagia [Nosebleeds] : no nosebleeds [Hoarseness] : no hoarseness [Odynophagia] : no odynophagia [Chest Pain] : no chest pain [Palpitations] : no palpitations [Lower Ext Edema] : no lower extremity edema [Wheezing] : no wheezing [Cough] : no cough [SOB on Exertion] : no shortness of breath during exertion [Dysuria] : no dysuria [Incontinence] : no incontinence [Muscle Pain] : no muscle pain [Skin Rash] : no skin rash [Dizziness] : no dizziness [Anxiety] : no anxiety [Depression] : no depression [Hot Flashes] : no hot flashes [Muscle Weakness] : no muscle weakness [Easy Bruising] : no tendency for easy bruising [Negative] : Cardiovascular [FreeTextEntry9] : knees [de-identified] : No HA, no parethesias

## 2021-06-10 LAB
ALBUMIN SERPL ELPH-MCNC: 4.3 G/DL
ALP BLD-CCNC: 79 U/L
ALT SERPL-CCNC: 16 U/L
ANION GAP SERPL CALC-SCNC: 14 MMOL/L
AST SERPL-CCNC: 17 U/L
BILIRUB SERPL-MCNC: 0.4 MG/DL
BUN SERPL-MCNC: 22 MG/DL
CALCIUM SERPL-MCNC: 9.6 MG/DL
CHLORIDE SERPL-SCNC: 105 MMOL/L
CO2 SERPL-SCNC: 24 MMOL/L
CREAT SERPL-MCNC: 1.23 MG/DL
GLUCOSE SERPL-MCNC: 104 MG/DL
POTASSIUM SERPL-SCNC: 4.2 MMOL/L
PROT SERPL-MCNC: 6.9 G/DL
SODIUM SERPL-SCNC: 143 MMOL/L

## 2021-06-17 ENCOUNTER — NON-APPOINTMENT (OUTPATIENT)
Age: 66
End: 2021-06-17

## 2021-06-18 ENCOUNTER — NON-APPOINTMENT (OUTPATIENT)
Age: 66
End: 2021-06-18

## 2021-06-18 ENCOUNTER — APPOINTMENT (OUTPATIENT)
Dept: CARDIOLOGY | Facility: CLINIC | Age: 66
End: 2021-06-18
Payer: MEDICARE

## 2021-06-18 VITALS
BODY MASS INDEX: 28.67 KG/M2 | SYSTOLIC BLOOD PRESSURE: 154 MMHG | OXYGEN SATURATION: 98 % | WEIGHT: 214 LBS | HEART RATE: 82 BPM | DIASTOLIC BLOOD PRESSURE: 92 MMHG

## 2021-06-18 VITALS — DIASTOLIC BLOOD PRESSURE: 92 MMHG | SYSTOLIC BLOOD PRESSURE: 150 MMHG

## 2021-06-18 DIAGNOSIS — R60.0 LOCALIZED EDEMA: ICD-10-CM

## 2021-06-18 DIAGNOSIS — R00.2 PALPITATIONS: ICD-10-CM

## 2021-06-18 PROCEDURE — 99204 OFFICE O/P NEW MOD 45 MIN: CPT

## 2021-06-18 PROCEDURE — 93000 ELECTROCARDIOGRAM COMPLETE: CPT

## 2021-06-18 NOTE — DISCUSSION/SUMMARY
[FreeTextEntry1] : He is a 65-year-old with hypertension, hyperlipidemia and atypical chest discomfort with an ECG that shows PAC but is otherwise unremarkable.  His blood pressure control is not optimal and I have suggested that he increase his physical activity and reduce his overall caloric intake.  Goal weight of 200 pounds was dated.\par I suggested that he undergo echocardiography in order to exclude any structural heart disease that may be the source of his leg edema or chest palpitations.\par If his symptoms persist consideration for ischemic evaluation with either exercise treadmill (which may be difficult given his inability to hear (versus CT angiography of his coronary arteries which requires less coordinated instructions.\par I will forward you the results when I get them.\par

## 2021-06-18 NOTE — PHYSICAL EXAM
[Well Developed] : well developed [Well Nourished] : well nourished [No Acute Distress] : no acute distress [Normal Conjunctiva] : normal conjunctiva [Normal Venous Pressure] : normal venous pressure [Normal S1, S2] : normal S1, S2 [No Murmur] : no murmur [Clear Lung Fields] : clear lung fields [Good Air Entry] : good air entry [Soft] : abdomen soft [Non Tender] : non-tender [Normal Gait] : normal gait [No Rash] : no rash [Moves all extremities] : moves all extremities [Alert and Oriented] : alert and oriented [de-identified] : Bilateral ankle edema left greater than right.  Varicose veins noted [de-identified] : Hard of hearing though able to verbalize somewhat.

## 2021-06-18 NOTE — HISTORY OF PRESENT ILLNESS
[FreeTextEntry1] : Dear Will,\par Thank you for referring him for cardiovascular valuation.  He is a 65-year-old deaf person who wanted a cardiovascular evaluation.  He had recent prostate surgery and is currently on Lupron therapy for androgen deprivation therapy postoperatively.  He reports feeling okay overall though he has been eating a lot lately.  He notes some fluttering in his chest that happens when he exerts himself it is unclear whether there is any discomfort associated with this or not or any other associated symptoms.\par He also notes leg edema that is worse when he sits and seems to resolve with walking.\par He has no history of coronary artery disease, diabetes mellitus, smoking or family history of premature coronary artery disease.  He is currently being treated for hyperlipidemia and hypertension.\par Our entire interview was being mediated by a .\par

## 2021-07-23 ENCOUNTER — APPOINTMENT (OUTPATIENT)
Dept: DERMATOLOGY | Facility: CLINIC | Age: 66
End: 2021-07-23
Payer: MEDICARE

## 2021-07-23 DIAGNOSIS — L81.4 OTHER MELANIN HYPERPIGMENTATION: ICD-10-CM

## 2021-07-23 DIAGNOSIS — L82.0 INFLAMED SEBORRHEIC KERATOSIS: ICD-10-CM

## 2021-07-23 PROCEDURE — 17000 DESTRUCT PREMALG LESION: CPT | Mod: 59

## 2021-07-23 PROCEDURE — 99202 OFFICE O/P NEW SF 15 MIN: CPT | Mod: 25

## 2021-07-23 PROCEDURE — 17003 DESTRUCT PREMALG LES 2-14: CPT | Mod: 59

## 2021-07-23 PROCEDURE — 17110 DESTRUCTION B9 LES UP TO 14: CPT

## 2021-07-28 PROBLEM — L82.0 INFLAMED SEBORRHEIC KERATOSIS: Status: ACTIVE | Noted: 2021-07-28

## 2021-07-28 PROBLEM — L81.4 LENTIGINES: Status: ACTIVE | Noted: 2021-07-28

## 2021-09-21 ENCOUNTER — APPOINTMENT (OUTPATIENT)
Dept: INFUSION THERAPY | Facility: HOSPITAL | Age: 66
End: 2021-09-21

## 2021-09-27 ENCOUNTER — OUTPATIENT (OUTPATIENT)
Dept: OUTPATIENT SERVICES | Facility: HOSPITAL | Age: 66
LOS: 1 days | Discharge: HOME | End: 2021-09-27

## 2021-09-27 DIAGNOSIS — K40.90 UNILATERAL INGUINAL HERNIA, WITHOUT OBSTRUCTION OR GANGRENE, NOT SPECIFIED AS RECURRENT: Chronic | ICD-10-CM

## 2021-09-27 DIAGNOSIS — C61 MALIGNANT NEOPLASM OF PROSTATE: ICD-10-CM

## 2021-09-28 ENCOUNTER — APPOINTMENT (OUTPATIENT)
Dept: INFUSION THERAPY | Facility: HOSPITAL | Age: 66
End: 2021-09-28

## 2021-09-28 ENCOUNTER — RESULT REVIEW (OUTPATIENT)
Age: 66
End: 2021-09-28

## 2021-09-28 ENCOUNTER — APPOINTMENT (OUTPATIENT)
Dept: HEMATOLOGY ONCOLOGY | Facility: CLINIC | Age: 66
End: 2021-09-28
Payer: MEDICARE

## 2021-09-28 VITALS
HEART RATE: 93 BPM | TEMPERATURE: 97.1 F | OXYGEN SATURATION: 95 % | HEIGHT: 72.44 IN | DIASTOLIC BLOOD PRESSURE: 80 MMHG | SYSTOLIC BLOOD PRESSURE: 120 MMHG | BODY MASS INDEX: 28.95 KG/M2 | WEIGHT: 216.05 LBS | RESPIRATION RATE: 17 BRPM

## 2021-09-28 LAB
BASOPHILS # BLD AUTO: 0.03 K/UL — SIGNIFICANT CHANGE UP (ref 0–0.2)
BASOPHILS NFR BLD AUTO: 0.6 % — SIGNIFICANT CHANGE UP (ref 0–2)
EOSINOPHIL # BLD AUTO: 0.08 K/UL — SIGNIFICANT CHANGE UP (ref 0–0.5)
EOSINOPHIL NFR BLD AUTO: 1.6 % — SIGNIFICANT CHANGE UP (ref 0–6)
HCT VFR BLD CALC: 43.7 % — SIGNIFICANT CHANGE UP (ref 39–50)
HGB BLD-MCNC: 14.3 G/DL — SIGNIFICANT CHANGE UP (ref 13–17)
IMM GRANULOCYTES NFR BLD AUTO: 0.4 % — SIGNIFICANT CHANGE UP (ref 0–1.5)
LYMPHOCYTES # BLD AUTO: 1.44 K/UL — SIGNIFICANT CHANGE UP (ref 1–3.3)
LYMPHOCYTES # BLD AUTO: 28 % — SIGNIFICANT CHANGE UP (ref 13–44)
MCHC RBC-ENTMCNC: 31 PG — SIGNIFICANT CHANGE UP (ref 27–34)
MCHC RBC-ENTMCNC: 32.7 G/DL — SIGNIFICANT CHANGE UP (ref 32–36)
MCV RBC AUTO: 94.8 FL — SIGNIFICANT CHANGE UP (ref 80–100)
MONOCYTES # BLD AUTO: 0.61 K/UL — SIGNIFICANT CHANGE UP (ref 0–0.9)
MONOCYTES NFR BLD AUTO: 11.8 % — SIGNIFICANT CHANGE UP (ref 2–14)
NEUTROPHILS # BLD AUTO: 2.97 K/UL — SIGNIFICANT CHANGE UP (ref 1.8–7.4)
NEUTROPHILS NFR BLD AUTO: 57.6 % — SIGNIFICANT CHANGE UP (ref 43–77)
NRBC # BLD: 0 /100 WBCS — SIGNIFICANT CHANGE UP (ref 0–0)
PLATELET # BLD AUTO: 217 K/UL — SIGNIFICANT CHANGE UP (ref 150–400)
RBC # BLD: 4.61 M/UL — SIGNIFICANT CHANGE UP (ref 4.2–5.8)
RBC # FLD: 12.4 % — SIGNIFICANT CHANGE UP (ref 10.3–14.5)
WBC # BLD: 5.15 K/UL — SIGNIFICANT CHANGE UP (ref 3.8–10.5)
WBC # FLD AUTO: 5.15 K/UL — SIGNIFICANT CHANGE UP (ref 3.8–10.5)

## 2021-09-28 PROCEDURE — 99213 OFFICE O/P EST LOW 20 MIN: CPT

## 2021-09-28 NOTE — PHYSICAL EXAM
[Fully active, able to carry on all pre-disease performance without restriction] : Status 0 - Fully active, able to carry on all pre-disease performance without restriction [Normal] : affect appropriate [de-identified] : trace pretibial edema [de-identified] : no gynecomastia

## 2021-09-28 NOTE — HISTORY OF PRESENT ILLNESS
[Disease: _____________________] : Disease: [unfilled] [T: ___] : T[unfilled] [N: ___] : N[unfilled] [AJCC Stage: ____] : AJCC Stage: [unfilled] [de-identified] : Mr. Turk is seen in consultation on February 11, 2020. In 2017, he was found to have a markedly elevated PSA. PSA was 30.8. \par He had a history of prior negative prostate biopsy in (2015). He had an MRI performed which revealed clinically significant disease. 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 Tumor was grade group 5 involving 80% of one core. Another biopsy in the same zone was grade group for involving 25% of one core. The left apex showed adenocarcinoma of prostate, grade group 4 involving 95% and 30%, of 2 out of 2 cores. All other cores were negative. CT-guided core biopsy was performed of the right iliac bone, which did not reveal evidence of metastases. He underwent a radical prostatectomy in the latter part of 2017. Adenocarcinoma of the prostate, prognostic grade group for ([place] 4+4 equals 8) with a tertiary pattern 5 was present. Disease was confined to the gland. One regional lymph node of 3 resected was positive for tumor, measuring 9 mm in size.\par His PSA was not undetectable in the postoperative setting, and he was referred for radiation therapy. This was administered from January 22 of 2018 until March 16, 2018. The total dose was 7000 cGy. he received a 6 month Lupron injection post op, (12/12/17) planned for 3 years of treatment, but insurance changes and apparently denied. \par On January 7, 2020, he had a PSA of 2.64. He was referred for a PET scan, and referred for medical oncology consultation.\par PSA values that are available revealed 24.42 on March 28, 2017. On June 19, 2017 it was 28.86 in August 15, 2017 it was 30.84. Postoperatively, the PSA was 1.69 on December 5, 2017 and 1.06 on September 9, 2019. It was 1.53 on October 1, 2019 and 2.64 on January 7, 2020.\par He had a PET CT done, revealed no evidence of mets.\par Feels well, no pains noted. Urine flow is good, has incontinence. No blood, no dysuria. Nocturia x 2. Wears a pad during the day. He has been recommended a Cunningham clamp. \par \par 5/19/20...Feels fine at this time, last seen 3 months ago. No pains. Urine flow is good, better sitting compared with standing. He has urgency, no incontinence. No blood, no dysuria. Nocturia 2-3 times, senses some frequency during the day. No back pains, no weakness. No edema of the legs. Appetite is normal, weight is stable. No fevers, nom chills, no recent infections. No fatigue. No cough, no GRAFF. \par \par 8/18/20...Feels well. No pains. No hot flushes. No fatigue. Urine flow is good. has incontinence with change in positions. wears a pad. No blood, no dysuria. Nocturia x 1-3 variable at times. he feels that if he eats protein for dinner he gets up more. No cough, No GRAFF, No edema. Appetite has been good, weight stable. No coronavirus infection. Lives with a roommate. Would like to have a COVID antibody test. \par \par 12/15/20...Due Eligard today. , live. He was seen by Dr Metz via video link. I believe this as for an artificial sphincter discussion, but he does not have good understanding. No pains. Appetite is good, no weight loss. No hot flushes. No fatigue. Urine stream is weak and urine sprays, has to sit to void. Nocturia  occasionally. Wears a pad. NO blood, no dysuria. No edema. No pains, no cough, no dyspnea. No fevers, no chills. Independent in ADls. \par \par 3/10/21 - radical prostatectomy followed by RT for [place] 9 prostate cancer, 2017. he had an artificial sphincter revision in January. Flow is better, with full control at this time. Nocturia is less, now at 2-3, prior 5. No dysuria. no blood. Appetite is good, no weight loss. Weight is up 3 pounds form last visit. No coronavirus vaccine as yet, unable to get an appt. No edema.no cough, no GRAFF. No fevers, no chills. No pains in the bones. No hot flushes. getting his Eligard today. No N/V/D/C. had colonoscopy and EGD, no issues. \par \par 6/9/21 - [facility] today. Feels well. seeing cardiologist next week for a check up, no issues. Has some knee arthritis, mostly when he is seated, not an issue with walking. Present gradually since 2013. NO meds used. Had CV vaccine. No hot flushes from the [facility]. No edema. no chest pains/palpitations. no chest pressure. Appetite is good, weight is stable. Urine flow is good, Has a new artificial sphincter, with no issues at all. No dysuria, no blood. No incontinence. Nocturia variable.  [de-identified] : John 4+4, tertiary pattern 5 at prostatectomy [de-identified] : 9/28/21 - went to derm as hr had some lesions removed. he then had cryo of the lesions./ Feels well overall. NO pains. Urine flow is improved when he sits, not strong when standing. Nocturia x 2, no incontinence. . No blood, no dysuria. No hot flushes, no significant fatigue. Appetite is good. No edema . No cough, no GRAFF. No chest pains/pressure.\par \par Derm note reviewed, they were SKs.

## 2021-09-28 NOTE — ASSESSMENT
[Palliative Care Plan] : not applicable at this time [FreeTextEntry1] : Mr. Turk is seen in follow-up today for metastatic prostate cancer.  He is on Lupron monotherapy and is due for his injection today.  His PSA has been undetectable since December 2020.  He was seen via the assistance of Milagros Keller, and .\par \par He went to dermatology as he had some lesions that were bothering him on his skin.  He said he had cryotherapy of the lesions.  The dermatology note was reviewed, and these included some seborrheic keratoses.  He feels well overall.  There were no pains.  Urinary flow is improved when he sits, but not strong when he stands to void.  Nocturia occurs twice.  There is no incontinence.  He has no hematuria or dysuria.  There is no urgency.  He does not experience any hot flushes.  There is no significant fatigue.  His appetite is good.  There were no respiratory complaints.  There is no chest pain or chest pressure.  There is no edema of the extremities.\par \par On physical examination, there are no significant findings.  There is trace pretibial edema present.  There is no spinal column or chest wall tenderness to palpation or percussion.\par \par Today's laboratory results are pending.  I will contact him when the results come through.  He received his injection of Lupron today and he will receive it again once again in 3 months when he sees me at the next visit.  All questions were answered to the best of my ability and to his apparent satisfaction.

## 2021-09-28 NOTE — REASON FOR VISIT
[Follow-Up Visit] : a follow-up [Other: ______] : provided by ANJANA [FreeTextEntry2] : prostate cancer [FreeTextEntry3] :  present, Milagros Keller

## 2021-09-28 NOTE — REVIEW OF SYSTEMS
[Loss of Hearing] : loss of hearing [Negative] : Gastrointestinal [Fever] : no fever [Chills] : no chills [Fatigue] : no fatigue [Recent Change In Weight] : ~T no recent weight change [Dysphagia] : no dysphagia [Nosebleeds] : no nosebleeds [Odynophagia] : no odynophagia [Mucosal Pain] : no mucosal pain [Chest Pain] : no chest pain [Palpitations] : no palpitations [Lower Ext Edema] : no lower extremity edema [Cough] : no cough [SOB on Exertion] : no shortness of breath during exertion [Dysuria] : no dysuria [Incontinence] : no incontinence [Joint Pain] : no joint pain [Joint Stiffness] : no joint stiffness [Muscle Pain] : no muscle pain [Skin Rash] : no skin rash [Dizziness] : no dizziness [Anxiety] : no anxiety [Depression] : no depression [Hot Flashes] : no hot flashes [Muscle Weakness] : no muscle weakness [Easy Bleeding] : no tendency for easy bleeding [Easy Bruising] : no tendency for easy bruising [FreeTextEntry9] : no cramps [de-identified] : see HPI [de-identified] : No HA

## 2021-09-29 LAB
ALBUMIN SERPL ELPH-MCNC: 4.6 G/DL
ALP BLD-CCNC: 90 U/L
ALT SERPL-CCNC: 16 U/L
ANION GAP SERPL CALC-SCNC: 11 MMOL/L
AST SERPL-CCNC: 19 U/L
BILIRUB SERPL-MCNC: 0.3 MG/DL
BUN SERPL-MCNC: 16 MG/DL
CALCIUM SERPL-MCNC: 9.9 MG/DL
CHLORIDE SERPL-SCNC: 104 MMOL/L
CO2 SERPL-SCNC: 28 MMOL/L
CREAT SERPL-MCNC: 1.15 MG/DL
GLUCOSE SERPL-MCNC: 103 MG/DL
POTASSIUM SERPL-SCNC: 4.5 MMOL/L
PROT SERPL-MCNC: 7.2 G/DL
PSA SERPL-MCNC: <0.01 NG/ML
SODIUM SERPL-SCNC: 143 MMOL/L

## 2021-11-09 ENCOUNTER — NON-APPOINTMENT (OUTPATIENT)
Age: 66
End: 2021-11-09

## 2021-11-16 ENCOUNTER — LABORATORY RESULT (OUTPATIENT)
Age: 66
End: 2021-11-16

## 2021-11-16 ENCOUNTER — APPOINTMENT (OUTPATIENT)
Dept: DERMATOLOGY | Facility: CLINIC | Age: 66
End: 2021-11-16
Payer: MEDICARE

## 2021-11-16 PROCEDURE — 17003 DESTRUCT PREMALG LES 2-14: CPT | Mod: GC

## 2021-11-16 PROCEDURE — 11104 PUNCH BX SKIN SINGLE LESION: CPT | Mod: GC

## 2021-11-16 PROCEDURE — 17000 DESTRUCT PREMALG LESION: CPT | Mod: 59,GC

## 2021-11-16 PROCEDURE — 99214 OFFICE O/P EST MOD 30 MIN: CPT | Mod: 25,GC

## 2021-11-16 PROCEDURE — 12031 INTMD RPR S/A/T/EXT 2.5 CM/<: CPT | Mod: 59,GC

## 2021-11-30 ENCOUNTER — NON-APPOINTMENT (OUTPATIENT)
Age: 66
End: 2021-11-30

## 2021-12-02 ENCOUNTER — APPOINTMENT (OUTPATIENT)
Dept: DERMATOLOGY | Facility: CLINIC | Age: 66
End: 2021-12-02
Payer: MEDICARE

## 2021-12-02 DIAGNOSIS — B36.0 PITYRIASIS VERSICOLOR: ICD-10-CM

## 2021-12-02 PROCEDURE — 99213 OFFICE O/P EST LOW 20 MIN: CPT

## 2021-12-27 ENCOUNTER — OUTPATIENT (OUTPATIENT)
Dept: OUTPATIENT SERVICES | Facility: HOSPITAL | Age: 66
LOS: 1 days | Discharge: HOME | End: 2021-12-27

## 2021-12-27 DIAGNOSIS — C61 MALIGNANT NEOPLASM OF PROSTATE: ICD-10-CM

## 2021-12-27 DIAGNOSIS — K40.90 UNILATERAL INGUINAL HERNIA, WITHOUT OBSTRUCTION OR GANGRENE, NOT SPECIFIED AS RECURRENT: Chronic | ICD-10-CM

## 2021-12-28 ENCOUNTER — APPOINTMENT (OUTPATIENT)
Dept: HEMATOLOGY ONCOLOGY | Facility: CLINIC | Age: 66
End: 2021-12-28
Payer: MEDICARE

## 2022-01-30 ENCOUNTER — OUTPATIENT (OUTPATIENT)
Dept: OUTPATIENT SERVICES | Facility: HOSPITAL | Age: 67
LOS: 1 days | Discharge: HOME | End: 2022-01-30

## 2022-01-30 DIAGNOSIS — K40.90 UNILATERAL INGUINAL HERNIA, WITHOUT OBSTRUCTION OR GANGRENE, NOT SPECIFIED AS RECURRENT: Chronic | ICD-10-CM

## 2022-01-30 DIAGNOSIS — C61 MALIGNANT NEOPLASM OF PROSTATE: ICD-10-CM

## 2022-02-02 ENCOUNTER — RESULT REVIEW (OUTPATIENT)
Age: 67
End: 2022-02-02

## 2022-02-02 ENCOUNTER — APPOINTMENT (OUTPATIENT)
Dept: INFUSION THERAPY | Facility: HOSPITAL | Age: 67
End: 2022-02-02

## 2022-02-02 ENCOUNTER — APPOINTMENT (OUTPATIENT)
Dept: HEMATOLOGY ONCOLOGY | Facility: CLINIC | Age: 67
End: 2022-02-02
Payer: MEDICARE

## 2022-02-02 VITALS
WEIGHT: 218.24 LBS | DIASTOLIC BLOOD PRESSURE: 81 MMHG | TEMPERATURE: 98.1 F | HEIGHT: 72.87 IN | RESPIRATION RATE: 16 BRPM | BODY MASS INDEX: 28.92 KG/M2 | HEART RATE: 98 BPM | OXYGEN SATURATION: 98 % | SYSTOLIC BLOOD PRESSURE: 141 MMHG

## 2022-02-02 LAB
ALBUMIN SERPL ELPH-MCNC: 4.4 G/DL
ALP BLD-CCNC: 83 U/L
ALT SERPL-CCNC: 22 U/L
ANION GAP SERPL CALC-SCNC: 11 MMOL/L
AST SERPL-CCNC: 21 U/L
BASOPHILS # BLD AUTO: 0.02 K/UL — SIGNIFICANT CHANGE UP (ref 0–0.2)
BASOPHILS NFR BLD AUTO: 0.5 % — SIGNIFICANT CHANGE UP (ref 0–2)
BILIRUB SERPL-MCNC: 0.2 MG/DL
BUN SERPL-MCNC: 18 MG/DL
CALCIUM SERPL-MCNC: 9.6 MG/DL
CHLORIDE SERPL-SCNC: 105 MMOL/L
CO2 SERPL-SCNC: 26 MMOL/L
CREAT SERPL-MCNC: 1.27 MG/DL
EOSINOPHIL # BLD AUTO: 0.09 K/UL — SIGNIFICANT CHANGE UP (ref 0–0.5)
EOSINOPHIL NFR BLD AUTO: 2.1 % — SIGNIFICANT CHANGE UP (ref 0–6)
GLUCOSE SERPL-MCNC: 110 MG/DL
HCT VFR BLD CALC: 43.5 % — SIGNIFICANT CHANGE UP (ref 39–50)
HGB BLD-MCNC: 13.9 G/DL — SIGNIFICANT CHANGE UP (ref 13–17)
IMM GRANULOCYTES NFR BLD AUTO: 0.2 % — SIGNIFICANT CHANGE UP (ref 0–1.5)
LYMPHOCYTES # BLD AUTO: 1.17 K/UL — SIGNIFICANT CHANGE UP (ref 1–3.3)
LYMPHOCYTES # BLD AUTO: 27.3 % — SIGNIFICANT CHANGE UP (ref 13–44)
MCHC RBC-ENTMCNC: 30.3 PG — SIGNIFICANT CHANGE UP (ref 27–34)
MCHC RBC-ENTMCNC: 32 G/DL — SIGNIFICANT CHANGE UP (ref 32–36)
MCV RBC AUTO: 94.8 FL — SIGNIFICANT CHANGE UP (ref 80–100)
MONOCYTES # BLD AUTO: 0.58 K/UL — SIGNIFICANT CHANGE UP (ref 0–0.9)
MONOCYTES NFR BLD AUTO: 13.6 % — SIGNIFICANT CHANGE UP (ref 2–14)
NEUTROPHILS # BLD AUTO: 2.41 K/UL — SIGNIFICANT CHANGE UP (ref 1.8–7.4)
NEUTROPHILS NFR BLD AUTO: 56.3 % — SIGNIFICANT CHANGE UP (ref 43–77)
NRBC # BLD: 0 /100 WBCS — SIGNIFICANT CHANGE UP (ref 0–0)
PLATELET # BLD AUTO: 210 K/UL — SIGNIFICANT CHANGE UP (ref 150–400)
POTASSIUM SERPL-SCNC: 4.7 MMOL/L
PROT SERPL-MCNC: 6.9 G/DL
PSA SERPL-MCNC: <0.01 NG/ML
RBC # BLD: 4.59 M/UL — SIGNIFICANT CHANGE UP (ref 4.2–5.8)
RBC # FLD: 12.6 % — SIGNIFICANT CHANGE UP (ref 10.3–14.5)
SODIUM SERPL-SCNC: 143 MMOL/L
WBC # BLD: 4.28 K/UL — SIGNIFICANT CHANGE UP (ref 3.8–10.5)
WBC # FLD AUTO: 4.28 K/UL — SIGNIFICANT CHANGE UP (ref 3.8–10.5)

## 2022-02-02 PROCEDURE — 99213 OFFICE O/P EST LOW 20 MIN: CPT

## 2022-02-02 NOTE — PHYSICAL EXAM
[Fully active, able to carry on all pre-disease performance without restriction] : Status 0 - Fully active, able to carry on all pre-disease performance without restriction [Normal] : affect appropriate [de-identified] : S1 increased at apex, no murmur, RRR [de-identified] : no edema [de-identified] : no gynecomastia

## 2022-02-02 NOTE — HISTORY OF PRESENT ILLNESS
[de-identified] : Mr. Turk is seen in consultation on February 11, 2020. In 2017, he was found to have a markedly elevated PSA. PSA was 30.8. \par He had a history of prior negative prostate biopsy in (2015). He had an MRI performed which revealed clinically significant disease. 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 Tumor was grade group 5 involving 80% of one core. Another biopsy in the same zone was grade group for involving 25% of one core. The left apex showed adenocarcinoma of prostate, grade group 4 involving 95% and 30%, of 2 out of 2 cores. All other cores were negative. CT-guided core biopsy was performed of the right iliac bone, which did not reveal evidence of metastases. He underwent a radical prostatectomy in the latter part of 2017. Adenocarcinoma of the prostate, prognostic grade group for ([place] 4+4 equals 8) with a tertiary pattern 5 was present. Disease was confined to the gland. One regional lymph node of 3 resected was positive for tumor, measuring 9 mm in size.\par His PSA was not undetectable in the postoperative setting, and he was referred for radiation therapy. This was administered from January 22 of 2018 until March 16, 2018. The total dose was 7000 cGy. he received a 6 month Lupron injection post op, (12/12/17) planned for 3 years of treatment, but insurance changes and apparently denied. \par On January 7, 2020, he had a PSA of 2.64. He was referred for a PET scan, and referred for medical oncology consultation.\par PSA values that are available revealed 24.42 on March 28, 2017. On June 19, 2017 it was 28.86 in August 15, 2017 it was 30.84. Postoperatively, the PSA was 1.69 on December 5, 2017 and 1.06 on September 9, 2019. It was 1.53 on October 1, 2019 and 2.64 on January 7, 2020.\par He had a PET CT done, revealed no evidence of mets.\par Feels well, no pains noted. Urine flow is good, has incontinence. No blood, no dysuria. Nocturia x 2. Wears a pad during the day. He has been recommended a Cunningham \par clamp. \par \par 5/19/20...Feels fine at this time, last seen 3 months ago. No pains. Urine flow is good, better sitting compared with standing. He has urgency, no incontinence. No blood, no dysuria. Nocturia 2-3 times, senses some frequency during the day. No back pains, no weakness. No edema of the legs. Appetite is normal, weight is stable. No fevers, nom chills, no recent infections. No fatigue. No cough, no GRAFF. \par \par 8/18/20...Feels well. No pains. No hot flushes. No fatigue. Urine flow is good. has incontinence with change in positions. wears a pad. No blood, no dysuria. Nocturia x 1-3 variable at times. he feels that if he eats protein for dinner he gets up more. No cough, No GRAFF, No edema. Appetite has been good, weight stable. No coronavirus infection. Lives with a roommate. Would like to have a COVID antibody test. \par \par 12/15/20...Due Eligard today. , live. He was seen by Dr Metz via video link. I believe this as for an artificial sphincter discussion, but he does not have good understanding. No pains. Appetite is good, no weight loss. No hot flushes. No fatigue. Urine stream is weak and urine sprays, has to sit to void. Nocturia  occasionally. Wears a pad. NO blood, no dysuria. No edema. No pains, no cough, no dyspnea. No fevers, no chills. Independent in ADls. 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 he had an artificial sphincter revision in January. Flow is better, with full control at this time. Nocturia is less, now at 2-3, prior 5. No dysuria. no blood. Appetite is good, no weight loss. Weight is up 3 pounds form last visit. No coronavirus vaccine as yet, unable to get an appt. No edema.no cough, no GRAFF. No fevers, no chills. No pains in the bones. No hot flushes. getting his Eligard today. No N/V/D/C. had colonoscopy and EGD, no issues. \par \par 6/9/21 - Eligard today. Feels well. seeing cardiologist next week for a check up, no issues. Has some knee arthritis, mostly when he is seated, not an issue with walking. Present gradually since 2013. NO meds used. Had CV vaccine. No hot flushes from the Eligard. No edema. no chest pains/palpitations. no chest pressure. Appetite is good, weight is stable. Urine flow is good, Has a new artificial sphincter, with no issues at all. No dysuria, no blood. No incontinence. Nocturia variable. \par \par 9/28/21 - went to derm as hr had some lesions removed. he then had cryo of the lesions./ Feels well overall. NO pains. Urine flow is improved when he sits, not strong when standing. Nocturia x 2, no incontinence. . No blood, no dysuria. No hot flushes, no significant fatigue. Appetite is good. No edema . No cough, no GRAFF. No chest pains/pressure.\par Derm note reviewed, they were SKs.   [de-identified] : Jhon 4+4, tertiary pattern 5 at prostatectomy [de-identified] : 2/2/22, rescheduled from 12/28/21...Using [employer] video . "I'm fine". No pains. No fatigue. No hot flushes. Appetite is good, weight is stable. Urine flow is good, no incontinence unless with lifting. Nocturia x 1-2, improved. No blood, no dysuria. No edema. No chest pain/pressure. No cough, no GRAFF. No fevers, no chills. He did have a fever last month and cancelled his appt one month ago. Had his Covid booster shot. he was not tested, had rhinorrhea and fever with cough. No pains noted.

## 2022-02-02 NOTE — REASON FOR VISIT
[FreeTextEntry2] : prostate cancer [Interpreters_IDNumber] : [id number] [Interpreters_FullName] : Aren [FreeTextEntry3] :  present, Milagros Keller

## 2022-02-02 NOTE — ASSESSMENT
[FreeTextEntry1] : Mr Turk is seen in the office in follow-up today. He was due to be seen on December 28, but he was ill at that time with a febrile illness. He had a fever and he canceled his appointment out of concern for infection. He did have his Covid booster shot. He was not tested during this illness. He complained of rhinorrhea as well some cough. He may have had the Covid omicron variant. He reports no pains. He states that he is "fine". There is no fatigue. He has no hot flushes. His appetite is good and his weight is stable. Urinary flow is good. There is no incontinence unless he is lifting something heavy. Nocturia occurs once or twice. This is improved over time. There is no hematuria dysuria or urgency. He has no edema. There is no chest pain or chest pressure. There is no cough or shortness of breath.\par \par On physical examination, he appears well. His performance status is 0. On the cardiac examination, the S1 is increased at the apex. There is no murmur and the rate is regular. There is no edema of the extremities. There is no gynecomastia. The lungs are clear and the abdominal examination is normal. There is no spinal column or chest wall tenderness to palpation or percussion.\par \par He remains on Lupron, and he was due for his injection on December 28. His appointment for an injection was not rescheduled, but we were able to accommodate him today to get him his scheduled ADT.\par \par Today's CBC was normal. The PSA and chemistry panel is pending. He usually has an in person , but none was arranged for today. We use the video [employer]  without any problems. All questions were answered to the best my ability and to his apparent satisfaction. I will contact him once the report of his chemistry panel and PSA is reported. I will see him once again in 3 months time with an injection at the same time.

## 2022-02-02 NOTE — REVIEW OF SYSTEMS
[Loss of Hearing] : loss of hearing [Joint Pain] : joint pain [Negative] : Gastrointestinal [Fever] : no fever [Chills] : no chills [Fatigue] : no fatigue [Recent Change In Weight] : ~T no recent weight change [Dysphagia] : no dysphagia [Hoarseness] : no hoarseness [Odynophagia] : no odynophagia [Mucosal Pain] : no mucosal pain [Chest Pain] : no chest pain [Palpitations] : no palpitations [Lower Ext Edema] : no lower extremity edema [Cough] : no cough [SOB on Exertion] : no shortness of breath during exertion [Dysuria] : no dysuria [Joint Stiffness] : no joint stiffness [Muscle Pain] : no muscle pain [Skin Rash] : no skin rash [Dizziness] : no dizziness [Anxiety] : no anxiety [Depression] : no depression [Hot Flashes] : no hot flashes [Muscle Weakness] : no muscle weakness [Easy Bleeding] : no tendency for easy bleeding [Easy Bruising] : no tendency for easy bruising [FreeTextEntry8] : minimal incontinence if he lifts something heavy [FreeTextEntry9] : prior minor joint pains at times, none recently [de-identified] : No HA, no paresthesias

## 2022-04-13 ENCOUNTER — APPOINTMENT (OUTPATIENT)
Dept: UROLOGY | Facility: CLINIC | Age: 67
End: 2022-04-13
Payer: MEDICARE

## 2022-04-13 PROCEDURE — 99213 OFFICE O/P EST LOW 20 MIN: CPT

## 2022-04-13 NOTE — HISTORY OF PRESENT ILLNESS
[FreeTextEntry1] : 66-year-old gentleman who is now 1 year status post artificial urinary sphincter.  We used a  for the appointment today.  He states he is ecstatic with the outcome of the surgery.  He is dry and able to live a normal life.  He wears a liner for backup.

## 2022-04-13 NOTE — ASSESSMENT
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  He is wearing a liner as backup.\par \par 1.  Follow-up as needed.

## 2022-04-13 NOTE — PHYSICAL EXAM
[General Appearance - Well Developed] : well developed [General Appearance - Well Nourished] : well nourished [Normal Appearance] : normal appearance [Well Groomed] : well groomed [General Appearance - In No Acute Distress] : no acute distress [Abdomen Soft] : soft [Abdomen Tenderness] : non-tender [Costovertebral Angle Tenderness] : no ~M costovertebral angle tenderness [FreeTextEntry1] : Scrotal pump in good position. [Edema] : no peripheral edema [] : no respiratory distress [Respiration, Rhythm And Depth] : normal respiratory rhythm and effort [Exaggerated Use Of Accessory Muscles For Inspiration] : no accessory muscle use [Oriented To Time, Place, And Person] : oriented to person, place, and time [Normal Station and Gait] : the gait and station were normal for the patient's age

## 2022-04-13 NOTE — LETTER BODY
[Dear  ___] : Dear  [unfilled], [Courtesy Letter:] : I had the pleasure of seeing your patient, [unfilled], in my office today. [Please see my note below.] : Please see my note below. [Consult Closing:] : Thank you very much for allowing me to participate in the care of this patient.  If you have any questions, please do not hesitate to contact me. [Sincerely,] : Sincerely, [FreeTextEntry3] : Memo Wadsworth MD\par System Director [facility]\par Department of Urology\par [facility] \par   at [facility]\par  of Urology\par [employer] [employer] at [facility]\par

## 2022-04-17 ENCOUNTER — TRANSCRIPTION ENCOUNTER (OUTPATIENT)
Age: 67
End: 2022-04-17

## 2022-04-28 ENCOUNTER — APPOINTMENT (OUTPATIENT)
Dept: DERMATOLOGY | Facility: CLINIC | Age: 67
End: 2022-04-28
Payer: MEDICARE

## 2022-04-28 DIAGNOSIS — L98.9 DISORDER OF THE SKIN AND SUBCUTANEOUS TISSUE, UNSPECIFIED: ICD-10-CM

## 2022-04-28 PROCEDURE — 99213 OFFICE O/P EST LOW 20 MIN: CPT | Mod: 25,GC

## 2022-04-28 PROCEDURE — 17003 DESTRUCT PREMALG LES 2-14: CPT | Mod: GC

## 2022-04-28 PROCEDURE — 17000 DESTRUCT PREMALG LESION: CPT | Mod: GC

## 2022-05-06 ENCOUNTER — OUTPATIENT (OUTPATIENT)
Dept: OUTPATIENT SERVICES | Facility: HOSPITAL | Age: 67
LOS: 1 days | Discharge: HOME | End: 2022-05-06

## 2022-05-06 DIAGNOSIS — C61 MALIGNANT NEOPLASM OF PROSTATE: ICD-10-CM

## 2022-05-06 DIAGNOSIS — K40.90 UNILATERAL INGUINAL HERNIA, WITHOUT OBSTRUCTION OR GANGRENE, NOT SPECIFIED AS RECURRENT: Chronic | ICD-10-CM

## 2022-05-11 ENCOUNTER — APPOINTMENT (OUTPATIENT)
Dept: HEMATOLOGY ONCOLOGY | Facility: CLINIC | Age: 67
End: 2022-05-11
Payer: MEDICARE

## 2022-05-11 ENCOUNTER — RESULT REVIEW (OUTPATIENT)
Age: 67
End: 2022-05-11

## 2022-05-11 ENCOUNTER — APPOINTMENT (OUTPATIENT)
Dept: INFUSION THERAPY | Facility: HOSPITAL | Age: 67
End: 2022-05-11

## 2022-05-11 VITALS
RESPIRATION RATE: 16 BRPM | HEART RATE: 57 BPM | SYSTOLIC BLOOD PRESSURE: 137 MMHG | OXYGEN SATURATION: 98 % | WEIGHT: 210.1 LBS | TEMPERATURE: 97.2 F | DIASTOLIC BLOOD PRESSURE: 82 MMHG | BODY MASS INDEX: 27.82 KG/M2

## 2022-05-11 LAB
ALBUMIN SERPL ELPH-MCNC: 4.4 G/DL
ALP BLD-CCNC: 66 U/L
ALT SERPL-CCNC: 16 U/L
ANION GAP SERPL CALC-SCNC: 11 MMOL/L
AST SERPL-CCNC: 19 U/L
BASOPHILS # BLD AUTO: 0.02 K/UL — SIGNIFICANT CHANGE UP (ref 0–0.2)
BASOPHILS NFR BLD AUTO: 0.4 % — SIGNIFICANT CHANGE UP (ref 0–2)
BILIRUB SERPL-MCNC: 0.5 MG/DL
BUN SERPL-MCNC: 25 MG/DL
CALCIUM SERPL-MCNC: 9.6 MG/DL
CHLORIDE SERPL-SCNC: 104 MMOL/L
CO2 SERPL-SCNC: 26 MMOL/L
CREAT SERPL-MCNC: 1.19 MG/DL
EGFR: 67 ML/MIN/1.73M2
EOSINOPHIL # BLD AUTO: 0.08 K/UL — SIGNIFICANT CHANGE UP (ref 0–0.5)
EOSINOPHIL NFR BLD AUTO: 1.6 % — SIGNIFICANT CHANGE UP (ref 0–6)
GLUCOSE SERPL-MCNC: 110 MG/DL
HCT VFR BLD CALC: 42.4 % — SIGNIFICANT CHANGE UP (ref 39–50)
HGB BLD-MCNC: 13.9 G/DL — SIGNIFICANT CHANGE UP (ref 13–17)
IMM GRANULOCYTES NFR BLD AUTO: 0.2 % — SIGNIFICANT CHANGE UP (ref 0–1.5)
LYMPHOCYTES # BLD AUTO: 1.49 K/UL — SIGNIFICANT CHANGE UP (ref 1–3.3)
LYMPHOCYTES # BLD AUTO: 29.8 % — SIGNIFICANT CHANGE UP (ref 13–44)
MCHC RBC-ENTMCNC: 31.2 PG — SIGNIFICANT CHANGE UP (ref 27–34)
MCHC RBC-ENTMCNC: 32.8 G/DL — SIGNIFICANT CHANGE UP (ref 32–36)
MCV RBC AUTO: 95.1 FL — SIGNIFICANT CHANGE UP (ref 80–100)
MONOCYTES # BLD AUTO: 0.7 K/UL — SIGNIFICANT CHANGE UP (ref 0–0.9)
MONOCYTES NFR BLD AUTO: 14 % — SIGNIFICANT CHANGE UP (ref 2–14)
NEUTROPHILS # BLD AUTO: 2.7 K/UL — SIGNIFICANT CHANGE UP (ref 1.8–7.4)
NEUTROPHILS NFR BLD AUTO: 54 % — SIGNIFICANT CHANGE UP (ref 43–77)
NRBC # BLD: 0 /100 WBCS — SIGNIFICANT CHANGE UP (ref 0–0)
PLATELET # BLD AUTO: 184 K/UL — SIGNIFICANT CHANGE UP (ref 150–400)
POTASSIUM SERPL-SCNC: 4.7 MMOL/L
PROT SERPL-MCNC: 7.3 G/DL
RBC # BLD: 4.46 M/UL — SIGNIFICANT CHANGE UP (ref 4.2–5.8)
RBC # FLD: 12.4 % — SIGNIFICANT CHANGE UP (ref 10.3–14.5)
SODIUM SERPL-SCNC: 141 MMOL/L
WBC # BLD: 5 K/UL — SIGNIFICANT CHANGE UP (ref 3.8–10.5)
WBC # FLD AUTO: 5 K/UL — SIGNIFICANT CHANGE UP (ref 3.8–10.5)

## 2022-05-11 PROCEDURE — 99213 OFFICE O/P EST LOW 20 MIN: CPT

## 2022-05-11 NOTE — REVIEW OF SYSTEMS
[Fatigue] : fatigue [Incontinence] : incontinence [Negative] : Gastrointestinal [Fever] : no fever [Chills] : no chills [Recent Change In Weight] : ~T no recent weight change [Chest Pain] : no chest pain [Palpitations] : no palpitations [Lower Ext Edema] : no lower extremity edema [Wheezing] : no wheezing [Cough] : no cough [SOB on Exertion] : no shortness of breath during exertion [Dysuria] : no dysuria [Joint Pain] : no joint pain [Muscle Pain] : no muscle pain [Skin Rash] : no skin rash [Dizziness] : no dizziness [Anxiety] : no anxiety [Depression] : no depression [Hot Flashes] : no hot flashes [Muscle Weakness] : no muscle weakness [Easy Bleeding] : no tendency for easy bleeding [Easy Bruising] : no tendency for easy bruising [de-identified] : NO HA, no paresthesias of the feet

## 2022-05-11 NOTE — HISTORY OF PRESENT ILLNESS
[Disease: _____________________] : Disease: [unfilled] [T: ___] : T[unfilled] [N: ___] : N[unfilled] [AJCC Stage: ____] : AJCC Stage: [unfilled] [de-identified] : Mr. Turk is seen in consultation on February 11, 2020. In 2017, he was found to have a markedly elevated PSA. PSA was 30.8. \par He had a history of prior negative prostate biopsy in (2015). He had an MRI performed which revealed clinically significant disease. 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 Tumor was grade group 5 involving 80% of one core. Another biopsy in the same zone was grade group for involving 25% of one core. The left apex showed adenocarcinoma of prostate, grade group 4 involving 95% and 30%, of 2 out of 2 cores. All other cores were negative. CT-guided core biopsy was performed of the right iliac bone, which did not reveal evidence of metastases. He underwent a radical prostatectomy in the latter part of 2017. Adenocarcinoma of the prostate, prognostic grade group for ([place] 4+4 equals 8) with a tertiary pattern 5 was present. Disease was confined to the gland. One regional lymph node of 3 resected was positive for tumor, measuring 9 mm in size.\par His PSA was not undetectable in the postoperative setting, and he was referred for radiation therapy. This was administered from January 22 of 2018 until March 16, 2018. The total dose was 7000 cGy. he received a 6 month Lupron injection post op, (12/12/17) planned for 3 years of treatment, but insurance changes and apparently denied. \par On January 7, 2020, he had a PSA of 2.64. He was referred for a PET scan, and referred for medical oncology consultation.\par PSA values that are available revealed 24.42 on March 28, 2017. On June 19, 2017 it was 28.86 in August 15, 2017 it was 30.84. Postoperatively, the PSA was 1.69 on December 5, 2017 and 1.06 on September 9, 2019. It was 1.53 on October 1, 2019 and 2.64 on January 7, 2020.\par He had a PET CT done, revealed no evidence of mets.\par Feels well, no pains noted. Urine flow is good, has incontinence. No blood, no dysuria. Nocturia x 2. Wears a pad during the day. He has been recommended a Cunningham \par clamp. \par \par 5/19/20...Feels fine at this time, last seen 3 months ago. No pains. Urine flow is good, better sitting compared with standing. He has urgency, no incontinence. No blood, no dysuria. Nocturia 2-3 times, senses some frequency during the day. No back pains, no weakness. No edema of the legs. Appetite is normal, weight is stable. No fevers, nom chills, no recent infections. No fatigue. No cough, no GRAFF. \par \par 8/18/20...Feels well. No pains. No hot flushes. No fatigue. Urine flow is good. has incontinence with change in positions. wears a pad. No blood, no dysuria. Nocturia x 1-3 variable at times. he feels that if he eats protein for dinner he gets up more. No cough, No GRAFF, No edema. Appetite has been good, weight stable. No coronavirus infection. Lives with a roommate. Would like to have a COVID antibody test. \par \par 12/15/20...Due Eligard today. , live. He was seen by Dr Metz via video link. I believe this as for an artificial sphincter discussion, but he does not have good understanding. No pains. Appetite is good, no weight loss. No hot flushes. No fatigue. Urine stream is weak and urine sprays, has to sit to void. Nocturia  occasionally. Wears a pad. NO blood, no dysuria. No edema. No pains, no cough, no dyspnea. No fevers, no chills. Independent in ADls. 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 he had an artificial sphincter revision in January. Flow is better, with full control at this time. Nocturia is less, now at 2-3, prior 5. No dysuria. no blood. Appetite is good, no weight loss. Weight is up 3 pounds form last visit. No coronavirus vaccine as yet, unable to get an appt. No edema.no cough, no GRAFF. No fevers, no chills. No pains in the bones. No hot flushes. getting his Eligard today. No N/V/D/C. had colonoscopy and EGD, no issues. \par \par 6/9/21 - Eligard today. Feels well. seeing cardiologist next week for a check up, no issues. Has some knee arthritis, mostly when he is seated, not an issue with walking. Present gradually since 2013. NO meds used. Had CV vaccine. No hot flushes from the Eligard. No edema. no chest pains/palpitations. no chest pressure. Appetite is good, weight is stable. Urine flow is good, Has a new artificial sphincter, with no issues at all. No dysuria, no blood. No incontinence. Nocturia variable. \par \par 9/28/21 - went to derm as hr had some lesions removed. he then had cryo of the lesions./ Feels well overall. NO pains. Urine flow is improved when he sits, not strong when standing. Nocturia x 2, no incontinence. . No blood, no dysuria. No hot flushes, no significant fatigue. Appetite is good. No edema . No cough, no GRAFF. No chest pains/pressure.\par Derm note reviewed, they were SKs.  \par \par 2/2/22, rescheduled from 12/28/21...Using [place] video . "I'm fine". No pains. No fatigue. No hot flushes. Appetite is good, weight is stable. Urine flow is good, no incontinence unless with lifting. Nocturia x 1-2, improved. No blood, no dysuria. No edema. No chest pain/pressure. No cough, no GRAFF. No fevers, no chills. He did have a fever last month and cancelled his appt one month ago. Had his Covid booster shot. he was not tested, had rhinorrhea and fever with cough. No pains noted.  [de-identified] : John 4+4, tertiary pattern 5 at prostatectomy [de-identified] : 5/11/22...had shingles in April. Started on atenolol. rash resolved, has residual pain except for an episode of stabbing pain last week for one day. He feels fine. However, he does experience some pain at night in the area, a pinch like discomfort. C/W post herpetic neuralgia. Appetite is good, dropped a few pounds, trying to lose weight. No hot flushes noted. Urine flow is good, sits to void, no incontinence he says, then describes a Cunningham clamp. Nocturia 4-6 times, more since the pain in the right thigh area. feels somewhat different after the shingles, feels he has more pain when sitting and when more active. No cough, no GRAFF. No chest pain/pressure/palpitations. No edema. No pains in the bones. No N/V/D/C.

## 2022-05-11 NOTE — REASON FOR VISIT
[Follow-Up Visit] : a follow-up [Other: ______] : provided by ANJANA [FreeTextEntry2] : prostate cancer [Interpreters_IDNumber] : [id number] [Interpreters_FullName] : Phong [FreeTextEntry3] :  present, Milagros Keller

## 2022-05-11 NOTE — PHYSICAL EXAM
[Fully active, able to carry on all pre-disease performance without restriction] : Status 0 - Fully active, able to carry on all pre-disease performance without restriction [Normal Male] : prostate smooth, symmetric with no modularity or induration [Normal] : affect appropriate [de-identified] : no edema [de-identified] : no gynecomastia

## 2022-05-11 NOTE — ASSESSMENT
[Palliative Care Plan] : not applicable at this time [FreeTextEntry1] : Isaac Turk is seen in the office in follow-up today.  He had shingles in April.  He had pain in the inner aspect of the thigh with a rash.  He still has some significant pain in the area which comes and goes.  It is a pinching like discomfort and occasional very sharp pain.  He was recently started on atenolol.  He reports that he feels fine otherwise.  His symptoms are consistent with postherpetic neuralgia.  He was treated with valacyclovir during the appearance of his rash.  His appetite is good.  He did lose a few pounds and is trying to lose weight.  He has no hot flushes at this time.  Urinary flow is good.  He has to sit to void.  He says there is no incontinence, but then describes using a Cunningham clamp.  His history was  obtained via an .  He has nocturia 4-6 times per night.  And he notes that this is more frequent than before as he awakens more frequently with the pain in the right thigh area.  He notes some additional pain when he sitting for a long time, and also when he is more active.  There is no cough or shortness of breath.  He has no chest pain chest pressure or palpitations.  There is no edema of the extremities.  He has no bone pains.\par \par On physical examination, he appears well.  His performance status is 0.  There is no palpable adenopathy present.  The chest is clear and the heart examination is normal.  He is no edema of the extremities.  There is no gynecomastia.  The abdominal examination is normal.  There is no edema of the extremities.  There is no spinal column or chest wall tenderness to palpation or percussion.\par \par 's chemistry panel today was normal.  The CBC was normal.  The PSA has yet to be reported, but has been undetectable for quite some time dating back to December 2020.  He continues on Eligard monotherapy at this time and he received his injection today.\par \par All questions were answered to the best of my ability and to his apparent satisfaction.  He will be seen in follow-up in 3 months.

## 2022-05-13 LAB — PSA SERPL-MCNC: <0.01 NG/ML

## 2022-05-31 ENCOUNTER — APPOINTMENT (OUTPATIENT)
Dept: UROLOGY | Facility: CLINIC | Age: 67
End: 2022-05-31
Payer: MEDICARE

## 2022-05-31 VITALS — HEART RATE: 67 BPM | DIASTOLIC BLOOD PRESSURE: 80 MMHG | TEMPERATURE: 97.2 F | SYSTOLIC BLOOD PRESSURE: 126 MMHG

## 2022-05-31 PROCEDURE — 99214 OFFICE O/P EST MOD 30 MIN: CPT

## 2022-05-31 NOTE — ASSESSMENT
[FreeTextEntry1] : Diagnosis: prostate cancer\par \par Doing well\par Continue LHRH agonist for now; discuss with Dr. Villatoro and possibly consider a "holiday" off therapy. \par \par Follow up with Dr. Wadsworth for AUS follow up\par \par Dedrick High MD, [facility] \par  of Urology [facility]\par Director of Laparoscopic and Robotic Surgery \par [facility] Director of Urology, [facility] \par Professor of Urology\par \par (Office) \par (Cell)  [phone] \par [email or web address] \par \par

## 2022-05-31 NOTE — PHYSICAL EXAM
[General Appearance - Well Developed] : well developed [General Appearance - Well Nourished] : well nourished [Normal Appearance] : normal appearance [Well Groomed] : well groomed [General Appearance - In No Acute Distress] : no acute distress [Edema] : no peripheral edema [] : no respiratory distress [Respiration, Rhythm And Depth] : normal respiratory rhythm and effort [Exaggerated Use Of Accessory Muscles For Inspiration] : no accessory muscle use [Abdomen Soft] : soft [Abdomen Tenderness] : non-tender [Costovertebral Angle Tenderness] : no ~M costovertebral angle tenderness [Urethral Meatus] : meatus normal [Penis Abnormality] : normal uncircumcised penis [Urinary Bladder Findings] : the bladder was normal on palpation [Scrotum] : the scrotum was normal [Epididymis] : the epididymides were normal [Testes Tenderness] : no tenderness of the testes [Testes Mass (___cm)] : there were no testicular masses [FreeTextEntry1] : AUS pump in place nontethered, no skin changes [No Focal Deficits] : no focal deficits [Oriented To Time, Place, And Person] : oriented to person, place, and time [Affect] : the affect was normal [Mood] : the mood was normal [Not Anxious] : not anxious [No Palpable Adenopathy] : no palpable adenopathy

## 2022-05-31 NOTE — HISTORY OF PRESENT ILLNESS
[FreeTextEntry1] : CC: prostate cancer\par \par Disease: adenocarcinoma of the prostate \par Pathology: John 4+4, tertiary pattern 5 at prostatectomy \par TNM stage: T2, N1 \par AJCC Stage: IV \par \par S/P RALP 2017 followed by XRT\par On Eligard monotherapy (last injection 5/1/2022) \par \par PSA <0.01 ng/ml 5/13/22\par \par No other associated symptoms/complaints\par Through  he communicates "everything is fine"\par \par FAMHX: negative for CAP \par SURGHX: left hernia, RALP, AUS\par SOCIAL: nonsmoker \par ROS: HTN, deaf, GERD, otherwise negative in 10 organ system review

## 2022-08-02 ENCOUNTER — OUTPATIENT (OUTPATIENT)
Dept: OUTPATIENT SERVICES | Facility: HOSPITAL | Age: 67
LOS: 1 days | Discharge: HOME | End: 2022-08-02

## 2022-08-02 DIAGNOSIS — K40.90 UNILATERAL INGUINAL HERNIA, WITHOUT OBSTRUCTION OR GANGRENE, NOT SPECIFIED AS RECURRENT: Chronic | ICD-10-CM

## 2022-08-02 DIAGNOSIS — C61 MALIGNANT NEOPLASM OF PROSTATE: ICD-10-CM

## 2022-08-10 ENCOUNTER — APPOINTMENT (OUTPATIENT)
Dept: INFUSION THERAPY | Facility: HOSPITAL | Age: 67
End: 2022-08-10

## 2022-08-10 ENCOUNTER — RESULT REVIEW (OUTPATIENT)
Age: 67
End: 2022-08-10

## 2022-08-10 ENCOUNTER — APPOINTMENT (OUTPATIENT)
Dept: HEMATOLOGY ONCOLOGY | Facility: CLINIC | Age: 67
End: 2022-08-10

## 2022-08-10 VITALS
SYSTOLIC BLOOD PRESSURE: 117 MMHG | OXYGEN SATURATION: 98 % | BODY MASS INDEX: 29.61 KG/M2 | HEART RATE: 60 BPM | HEIGHT: 71.22 IN | WEIGHT: 213.83 LBS | DIASTOLIC BLOOD PRESSURE: 81 MMHG | TEMPERATURE: 97.4 F | RESPIRATION RATE: 16 BRPM

## 2022-08-10 LAB
ALBUMIN SERPL ELPH-MCNC: 4.3 G/DL
ALP BLD-CCNC: 68 U/L
ALT SERPL-CCNC: 17 U/L
ANION GAP SERPL CALC-SCNC: 11 MMOL/L
AST SERPL-CCNC: 20 U/L
BASOPHILS # BLD AUTO: 0.02 K/UL — SIGNIFICANT CHANGE UP (ref 0–0.2)
BASOPHILS NFR BLD AUTO: 0.4 % — SIGNIFICANT CHANGE UP (ref 0–2)
BILIRUB SERPL-MCNC: 0.3 MG/DL
BUN SERPL-MCNC: 22 MG/DL
CALCIUM SERPL-MCNC: 9.8 MG/DL
CHLORIDE SERPL-SCNC: 104 MMOL/L
CO2 SERPL-SCNC: 26 MMOL/L
CREAT SERPL-MCNC: 1.26 MG/DL
EGFR: 62 ML/MIN/1.73M2
EOSINOPHIL # BLD AUTO: 0.1 K/UL — SIGNIFICANT CHANGE UP (ref 0–0.5)
EOSINOPHIL NFR BLD AUTO: 2 % — SIGNIFICANT CHANGE UP (ref 0–6)
GLUCOSE SERPL-MCNC: 115 MG/DL
HCT VFR BLD CALC: 39.6 % — SIGNIFICANT CHANGE UP (ref 39–50)
HGB BLD-MCNC: 13.2 G/DL — SIGNIFICANT CHANGE UP (ref 13–17)
IMM GRANULOCYTES NFR BLD AUTO: 0.2 % — SIGNIFICANT CHANGE UP (ref 0–1.5)
LYMPHOCYTES # BLD AUTO: 1.52 K/UL — SIGNIFICANT CHANGE UP (ref 1–3.3)
LYMPHOCYTES # BLD AUTO: 30.3 % — SIGNIFICANT CHANGE UP (ref 13–44)
MCHC RBC-ENTMCNC: 31.1 PG — SIGNIFICANT CHANGE UP (ref 27–34)
MCHC RBC-ENTMCNC: 33.3 G/DL — SIGNIFICANT CHANGE UP (ref 32–36)
MCV RBC AUTO: 93.4 FL — SIGNIFICANT CHANGE UP (ref 80–100)
MONOCYTES # BLD AUTO: 0.68 K/UL — SIGNIFICANT CHANGE UP (ref 0–0.9)
MONOCYTES NFR BLD AUTO: 13.5 % — SIGNIFICANT CHANGE UP (ref 2–14)
NEUTROPHILS # BLD AUTO: 2.69 K/UL — SIGNIFICANT CHANGE UP (ref 1.8–7.4)
NEUTROPHILS NFR BLD AUTO: 53.6 % — SIGNIFICANT CHANGE UP (ref 43–77)
NRBC # BLD: 0 /100 WBCS — SIGNIFICANT CHANGE UP (ref 0–0)
PLATELET # BLD AUTO: 188 K/UL — SIGNIFICANT CHANGE UP (ref 150–400)
POTASSIUM SERPL-SCNC: 4.7 MMOL/L
PROT SERPL-MCNC: 7 G/DL
RBC # BLD: 4.24 M/UL — SIGNIFICANT CHANGE UP (ref 4.2–5.8)
RBC # FLD: 12.2 % — SIGNIFICANT CHANGE UP (ref 10.3–14.5)
SODIUM SERPL-SCNC: 141 MMOL/L
WBC # BLD: 5.02 K/UL — SIGNIFICANT CHANGE UP (ref 3.8–10.5)
WBC # FLD AUTO: 5.02 K/UL — SIGNIFICANT CHANGE UP (ref 3.8–10.5)

## 2022-08-10 PROCEDURE — 99214 OFFICE O/P EST MOD 30 MIN: CPT

## 2022-08-10 RX ORDER — GABAPENTIN 100 MG/1
100 CAPSULE ORAL
Qty: 90 | Refills: 0 | Status: DISCONTINUED | COMMUNITY
Start: 2022-08-05

## 2022-08-10 RX ORDER — VALACYCLOVIR 1 G/1
1 TABLET, FILM COATED ORAL
Qty: 21 | Refills: 0 | Status: DISCONTINUED | COMMUNITY
Start: 2022-04-17

## 2022-08-10 NOTE — PHYSICAL EXAM
[Fully active, able to carry on all pre-disease performance without restriction] : Status 0 - Fully active, able to carry on all pre-disease performance without restriction [Normal] : affect appropriate [de-identified] : no edema [de-identified] : no gynecomastia

## 2022-08-10 NOTE — HISTORY OF PRESENT ILLNESS
[Disease: _____________________] : Disease: [unfilled] [T: ___] : T[unfilled] [N: ___] : N[unfilled] [M: ___] : M[unfilled] [AJCC Stage: ____] : AJCC Stage: [unfilled] [de-identified] : Mr. Turk is seen in consultation on February 11, 2020. In 2017, he was found to have a markedly elevated PSA. PSA was 30.8. \par He had a history of prior negative prostate biopsy in (2015). He had an MRI performed which revealed clinically significant disease. 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 Tumor was grade group 5 involving 80% of one core. Another biopsy in the same zone was grade group for involving 25% of one core. The left apex showed adenocarcinoma of prostate, grade group 4 involving 95% and 30%, of 2 out of 2 cores. All other cores were negative. CT-guided core biopsy was performed of the right iliac bone, which did not reveal evidence of metastases. He underwent a radical prostatectomy in the latter part of 2017. Adenocarcinoma of the prostate, prognostic grade group for ([place] 4+4 equals 8) with a tertiary pattern 5 was present. Disease was confined to the gland. One regional lymph node of 3 resected was positive for tumor, measuring 9 mm in size.\par His PSA was not undetectable in the postoperative setting, and he was referred for radiation therapy. This was administered from January 22 of 2018 until March 16, 2018. The total dose was 7000 cGy. he received a 6 month Lupron injection post op, (12/12/17) planned for 3 years of treatment, but insurance changes and apparently denied. \par On January 7, 2020, he had a PSA of 2.64. He was referred for a PET scan, and referred for medical oncology consultation.\par PSA values that are available revealed 24.42 on March 28, 2017. On June 19, 2017 it was 28.86 in August 15, 2017 it was 30.84. Postoperatively, the PSA was 1.69 on December 5, 2017 and 1.06 on September 9, 2019. It was 1.53 on October 1, 2019 and 2.64 on January 7, 2020.\par He had a PET CT done, revealed no evidence of mets.\par Feels well, no pains noted. Urine flow is good, has incontinence. No blood, no dysuria. Nocturia x 2. Wears a pad during the day. He has been recommended a Cunningham \par clamp. \par \par 5/19/20...Feels fine at this time, last seen 3 months ago. No pains. Urine flow is good, better sitting compared with standing. He has urgency, no incontinence. No blood, no dysuria. Nocturia 2-3 times, senses some frequency during the day. No back pains, no weakness. No edema of the legs. Appetite is normal, weight is stable. No fevers, nom chills, no recent infections. No fatigue. No cough, no GRAFF. \par \par 8/18/20...Feels well. No pains. No hot flushes. No fatigue. Urine flow is good. has incontinence with change in positions. wears a pad. No blood, no dysuria. Nocturia x 1-3 variable at times. he feels that if he eats protein for dinner he gets up more. No cough, No GRAFF, No edema. Appetite has been good, weight stable. No coronavirus infection. Lives with a roommate. Would like to have a COVID antibody test. \par \par 12/15/20...Due Eligard today. , live. He was seen by Dr Metz via video link. I believe this as for an artificial sphincter discussion, but he does not have good understanding. No pains. Appetite is good, no weight loss. No hot flushes. No fatigue. Urine stream is weak and urine sprays, has to sit to void. Nocturia  occasionally. Wears a pad. NO blood, no dysuria. No edema. No pains, no cough, no dyspnea. No fevers, no chills. Independent in ADls. 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 he had an artificial sphincter revision in January. Flow is better, with full control at this time. Nocturia is less, now at 2-3, prior 5. No dysuria. no blood. Appetite is good, no weight loss. Weight is up 3 pounds form last visit. No coronavirus vaccine as yet, unable to get an appt. No edema.no cough, no GRAFF. No fevers, no chills. No pains in the bones. No hot flushes. getting his Eligard today. No N/V/D/C. had colonoscopy and EGD, no issues. \par \par 6/9/21 - Eligard today. Feels well. seeing cardiologist next week for a check up, no issues. Has some knee arthritis, mostly when he is seated, not an issue with walking. Present gradually since 2013. NO meds used. Had CV vaccine. No hot flushes from the Eligard. No edema. no chest pains/palpitations. no chest pressure. Appetite is good, weight is stable. Urine flow is good, Has a new artificial sphincter, with no issues at all. No dysuria, no blood. No incontinence. Nocturia variable. \par \par 9/28/21 - went to derm as hr had some lesions removed. he then had cryo of the lesions./ Feels well overall. NO pains. Urine flow is improved when he sits, not strong when standing. Nocturia x 2, no incontinence. . No blood, no dysuria. No hot flushes, no significant fatigue. Appetite is good. No edema . No cough, no GRAFF. No chest pains/pressure.\par Derm note reviewed, they were SKs.  \par \par 2/2/22, rescheduled from 12/28/21...Using [place] video . "I'm fine". No pains. No fatigue. No hot flushes. Appetite is good, weight is stable. Urine flow is good, no incontinence unless with lifting. Nocturia x 1-2, improved. No blood, no dysuria. No edema. No chest pain/pressure. No cough, no GRAFF. No fevers, no chills. He did have a fever last month and cancelled his appt one month ago. Had his Covid booster shot. he was not tested, had rhinorrhea and fever with cough. No pains noted. \par \par 5/11/22...had shingles in April. Started on atenolol. rash resolved, has residual pain except for an episode of stabbing pain last week for one day. He feels fine. However, he does experience some pain at night in the area, a pinch like discomfort. C/W post herpetic neuralgia. Appetite is good, dropped a few pounds, trying to lose weight. No hot flushes noted. Urine flow is good, sits to void, no incontinence he says, then describes a Cunningham clamp. Nocturia 4-6 times, more since the pain in the right thigh area. feels somewhat different after the shingles, feels he has more pain when sitting and when more active. No cough, no GRAFF. No chest pain/pressure/palpitations. No edema. No pains in the bones. No N/V/D/C.  [de-identified] : John 4+4, tertiary pattern 5 at prostatectomy [de-identified] : 8/10/22...summary to date: John 9 disease documented in early 2017.  He had a prior negative biopsy.  His PSA was up to 30.  An MRI was performed.  He underwent a radical prostatectomy and this revealed John 8 (4+4) with tertiary pattern 5.  1 of 3 lymph nodes resected was positive for tumor with a metastatic deposit of 9 mm in size.  In the postoperative setting, his PSA was detectable and he was referred for radiation therapy.  He was initially started on Lupron in addition to radiation therapy with a plan for 3 years of treatment, but insurance denied that plan and he received at least 1 dose for 6 months.  In January 2020, he had a PSA of 2.64 and he had a PET scan performed.  He remains on Eligard and his PSA has been undetectable since December 15, 2020.  He has not had any recent imaging since the PET scan in January 2020.  2 areas of focal activity was seen, 1 in each inguinal region associated with normal-sized lymph nodes.  This was an [facility] PET/CT..  There was no clear evidence of metastatic disease.\par \par  utilized.  He reports that he feels well.  He has no pains.  He is due for Lupron today.  He does not have any hot flushes.  He denies any fatigue.  There were no headaches or dizziness.  His appetite is good and there is no weight loss.  He has no cough or shortness of breath.  There were no GI complaints.  Urinary flow is normal.  Nocturia occurs approximately once per night.  There is no incontinence or urgency.  There is no dysuria or hematuria.  He reports no edema.  There is no chest pain chest pressure or palpitations.\par

## 2022-08-10 NOTE — REASON FOR VISIT
[Follow-Up Visit] : a follow-up [Other: ______] : provided by ANJANA [FreeTextEntry2] : prostate cancer [Interpreters_IDNumber] : [id number] [Interpreters_FullName] : Tresa

## 2022-08-10 NOTE — REVIEW OF SYSTEMS
[Loss of Hearing] : loss of hearing [Negative] : Gastrointestinal [Fever] : no fever [Chills] : no chills [Fatigue] : no fatigue [Recent Change In Weight] : ~T no recent weight change [Dysphagia] : no dysphagia [Nosebleeds] : no nosebleeds [Hoarseness] : no hoarseness [Odynophagia] : no odynophagia [Mucosal Pain] : no mucosal pain [Chest Pain] : no chest pain [Palpitations] : no palpitations [Lower Ext Edema] : no lower extremity edema [Cough] : no cough [SOB on Exertion] : no shortness of breath during exertion [Dysuria] : no dysuria [Incontinence] : no incontinence [Joint Pain] : no joint pain [Joint Stiffness] : no joint stiffness [Skin Rash] : no skin rash [Dizziness] : no dizziness [Difficulty Walking] : no difficulty walking [Anxiety] : no anxiety [Depression] : no depression [Hot Flashes] : no hot flashes [Muscle Weakness] : no muscle weakness [Easy Bleeding] : no tendency for easy bleeding [Easy Bruising] : no tendency for easy bruising [de-identified] : No headaches, no paresthesias

## 2022-08-10 NOTE — ASSESSMENT
[Palliative Care Plan] : not applicable at this time [FreeTextEntry1] : Hardik is seen in the office today in follow-up.  He had [place] 9 prostate cancer documented in early 2017.  His PSA was about 30 at the time of diagnosis.  He had a radical prostatectomy revealing John 8 disease with a tertiary pattern of 5.  1 out of 3 lymph nodes resected was positive for tumor with a metastatic deposit of 9 mm in size.  In the postoperative setting his PSA was detectable and he was referred for radiation therapy.  He was started on Lupron in addition.  He received 1 dose of Lupron for a total of 6 months after his insurance denied further treatment.  Subsequently his PSA omari.  He was restarted and has remained remained on Eligard today.  He has not had any recent imaging since his PET scan in January 2020.  There were 2 focal activities seen at that time 1 in each inguinal region associated with normal size lymph nodes.  This was an [facility] PET/CT.  There was no clear evidence of metastatic disease.  An  was utilized.  He reports that he feels well.  He has no pains.  He is due for his Lupron today.  He does not have any hot flushes.  There is no fatigue.  He has no headaches or dizziness.  His appetite is good and there is no weight loss.  He has no cough or shortness of breath.  There were no GI complaints.  Urinary flow is normal.  Nocturia occurs once per night.  There is no incontinence or urgency.  There is no dysuria or hematuria.  He has no chest pain chest pressure or palpitations.  There is no edema of the extremities.\par \par On physical examination, he appears well and in no acute distress.  His performance status is 0.  There were no significant findings on physical examination.\par \par He remains on Lupron monotherapy at this time and his PSA has been undetectable for considerable time dating back to December 2020.\par \par Today's comprehensive metabolic panel revealed normal findings with exception of a glucose of 115.  The PSA was once again undetectable.  The CBC was normal.\par \par I will see him once again in 3 months time at the time of his next Lupron injection.  All questions were answered to the best of my ability and to his apparent satisfaction.

## 2022-08-11 ENCOUNTER — NON-APPOINTMENT (OUTPATIENT)
Age: 67
End: 2022-08-11

## 2022-08-11 ENCOUNTER — APPOINTMENT (OUTPATIENT)
Dept: RADIOLOGY | Facility: IMAGING CENTER | Age: 67
End: 2022-08-11

## 2022-08-11 ENCOUNTER — OUTPATIENT (OUTPATIENT)
Dept: OUTPATIENT SERVICES | Facility: HOSPITAL | Age: 67
LOS: 1 days | End: 2022-08-11
Payer: MEDICARE

## 2022-08-11 DIAGNOSIS — C61 MALIGNANT NEOPLASM OF PROSTATE: ICD-10-CM

## 2022-08-11 DIAGNOSIS — K40.90 UNILATERAL INGUINAL HERNIA, WITHOUT OBSTRUCTION OR GANGRENE, NOT SPECIFIED AS RECURRENT: Chronic | ICD-10-CM

## 2022-08-11 LAB — PSA SERPL-MCNC: <0.01 NG/ML

## 2022-08-11 PROCEDURE — 77080 DXA BONE DENSITY AXIAL: CPT

## 2022-08-11 PROCEDURE — 77080 DXA BONE DENSITY AXIAL: CPT | Mod: 26

## 2022-08-16 ENCOUNTER — NON-APPOINTMENT (OUTPATIENT)
Age: 67
End: 2022-08-16

## 2022-11-01 ENCOUNTER — OUTPATIENT (OUTPATIENT)
Dept: OUTPATIENT SERVICES | Facility: HOSPITAL | Age: 67
LOS: 1 days | Discharge: HOME | End: 2022-11-01

## 2022-11-01 DIAGNOSIS — C61 MALIGNANT NEOPLASM OF PROSTATE: ICD-10-CM

## 2022-11-01 DIAGNOSIS — K40.90 UNILATERAL INGUINAL HERNIA, WITHOUT OBSTRUCTION OR GANGRENE, NOT SPECIFIED AS RECURRENT: Chronic | ICD-10-CM

## 2022-11-09 ENCOUNTER — RESULT REVIEW (OUTPATIENT)
Age: 67
End: 2022-11-09

## 2022-11-09 ENCOUNTER — APPOINTMENT (OUTPATIENT)
Dept: INFUSION THERAPY | Facility: HOSPITAL | Age: 67
End: 2022-11-09

## 2022-11-09 ENCOUNTER — APPOINTMENT (OUTPATIENT)
Dept: HEMATOLOGY ONCOLOGY | Facility: CLINIC | Age: 67
End: 2022-11-09

## 2022-11-09 VITALS
SYSTOLIC BLOOD PRESSURE: 134 MMHG | RESPIRATION RATE: 16 BRPM | TEMPERATURE: 97.4 F | DIASTOLIC BLOOD PRESSURE: 84 MMHG | OXYGEN SATURATION: 98 % | HEART RATE: 57 BPM

## 2022-11-09 LAB
BASOPHILS # BLD AUTO: 0.02 K/UL — SIGNIFICANT CHANGE UP (ref 0–0.2)
BASOPHILS NFR BLD AUTO: 0.3 % — SIGNIFICANT CHANGE UP (ref 0–2)
EOSINOPHIL # BLD AUTO: 0.12 K/UL — SIGNIFICANT CHANGE UP (ref 0–0.5)
EOSINOPHIL NFR BLD AUTO: 2.1 % — SIGNIFICANT CHANGE UP (ref 0–6)
HCT VFR BLD CALC: 43.1 % — SIGNIFICANT CHANGE UP (ref 39–50)
HGB BLD-MCNC: 14.1 G/DL — SIGNIFICANT CHANGE UP (ref 13–17)
IMM GRANULOCYTES NFR BLD AUTO: 0.3 % — SIGNIFICANT CHANGE UP (ref 0–0.9)
LYMPHOCYTES # BLD AUTO: 1.49 K/UL — SIGNIFICANT CHANGE UP (ref 1–3.3)
LYMPHOCYTES # BLD AUTO: 25.7 % — SIGNIFICANT CHANGE UP (ref 13–44)
MCHC RBC-ENTMCNC: 30.9 PG — SIGNIFICANT CHANGE UP (ref 27–34)
MCHC RBC-ENTMCNC: 32.7 G/DL — SIGNIFICANT CHANGE UP (ref 32–36)
MCV RBC AUTO: 94.3 FL — SIGNIFICANT CHANGE UP (ref 80–100)
MONOCYTES # BLD AUTO: 0.71 K/UL — SIGNIFICANT CHANGE UP (ref 0–0.9)
MONOCYTES NFR BLD AUTO: 12.3 % — SIGNIFICANT CHANGE UP (ref 2–14)
NEUTROPHILS # BLD AUTO: 3.43 K/UL — SIGNIFICANT CHANGE UP (ref 1.8–7.4)
NEUTROPHILS NFR BLD AUTO: 59.3 % — SIGNIFICANT CHANGE UP (ref 43–77)
NRBC # BLD: 0 /100 WBCS — SIGNIFICANT CHANGE UP (ref 0–0)
PLATELET # BLD AUTO: 216 K/UL — SIGNIFICANT CHANGE UP (ref 150–400)
RBC # BLD: 4.57 M/UL — SIGNIFICANT CHANGE UP (ref 4.2–5.8)
RBC # FLD: 12.1 % — SIGNIFICANT CHANGE UP (ref 10.3–14.5)
WBC # BLD: 5.79 K/UL — SIGNIFICANT CHANGE UP (ref 3.8–10.5)
WBC # FLD AUTO: 5.79 K/UL — SIGNIFICANT CHANGE UP (ref 3.8–10.5)

## 2022-11-09 PROCEDURE — 99213 OFFICE O/P EST LOW 20 MIN: CPT

## 2022-11-10 LAB
ALBUMIN SERPL ELPH-MCNC: 4.2 G/DL
ALP BLD-CCNC: 78 U/L
ALT SERPL-CCNC: 18 U/L
ANION GAP SERPL CALC-SCNC: 11 MMOL/L
AST SERPL-CCNC: 20 U/L
BILIRUB SERPL-MCNC: 0.2 MG/DL
BUN SERPL-MCNC: 16 MG/DL
CALCIUM SERPL-MCNC: 9.7 MG/DL
CHLORIDE SERPL-SCNC: 105 MMOL/L
CO2 SERPL-SCNC: 26 MMOL/L
CREAT SERPL-MCNC: 1.14 MG/DL
EGFR: 70 ML/MIN/1.73M2
GLUCOSE SERPL-MCNC: 93 MG/DL
POTASSIUM SERPL-SCNC: 4.8 MMOL/L
PROT SERPL-MCNC: 7.1 G/DL
PSA SERPL-MCNC: <0.01 NG/ML
SODIUM SERPL-SCNC: 142 MMOL/L

## 2022-11-19 NOTE — ASSESSMENT
[Palliative Care Plan] : not applicable at this time [FreeTextEntry1] : Isaac Turk is seen for f/u of prostate cancer. He had [place] 9 prostate cancer documented in early 2017. His PSA was about 30 at the time of diagnosis. He had a radical prostatectomy revealing John 8 disease with a tertiary pattern of 5, 1 out of 3 lymph nodes resected was positive for tumor with a metastatic deposit of 9 mm in size. In the postoperative setting his PSA was detectable and he was referred for radiation therapy. He was started on Lupron in addition. He received 1 dose of Lupron for a total of 6 months after his insurance denied further treatment. Subsequently his PSA omari. He was restarted and has remained on Lupron monotherapy.\par \par Prostate cancer:\par - 1/31/20 PET [facility] shows no definite evidence of metastatic disease. \par - PSA has been undetectable since Dec 2020, last PSA on 8/10/22 was once again undetectable.\par - Repeat PSA today. No symptoms concerning for disease progression. \par - Continue Lupron inj q3mo, given today. \par \par Instructed to contact our office with any new/worsening symptoms.\par Pt educated regarding plan of care, all questions/concerns addressed to the best of my abilities and his apparent satisfaction.\par F/u in 3 months for provider visit + Lupron inj\par

## 2022-11-19 NOTE — HISTORY OF PRESENT ILLNESS
[Disease: _____________________] : Disease: [unfilled] [T: ___] : T[unfilled] [N: ___] : N[unfilled] [M: ___] : M[unfilled] [AJCC Stage: ____] : AJCC Stage: [unfilled] [de-identified] : Mr. Turk is seen in consultation on February 11, 2020. In 2017, he was found to have a markedly elevated PSA. PSA was 30.8. \par He had a history of prior negative prostate biopsy in (2015). He had an MRI performed which revealed clinically significant disease. He underwent a targeted biopsy which revealed [place] 9 in the left mid zone. Tumor was grade group 5 involving 80% of one core. Another biopsy in the same zone was grade group for involving 25% of one core. The left apex showed adenocarcinoma of prostate, grade group 4 involving 95% and 30%, of 2 out of 2 cores. All other cores were negative. CT-guided core biopsy was performed of the right iliac bone, which did not reveal evidence of metastases. He underwent a radical prostatectomy in the latter part of 2017. Adenocarcinoma of the prostate, prognostic grade group for ([place] 4+4 equals 8) with a tertiary pattern 5 was present. Disease was confined to the gland. One regional lymph node of 3 resected was positive for tumor, measuring 9 mm in size.\par His PSA was not undetectable in the postoperative setting, and he was referred for radiation therapy. This was administered from January 22 of 2018 until March 16, 2018. The total dose was 7000 cGy. he received a 6 month Lupron injection post op, (12/12/17) planned for 3 years of treatment, but insurance changes and apparently denied. \par On January 7, 2020, he had a PSA of 2.64. He was referred for a PET scan, and referred for medical oncology consultation.\par PSA values that are available revealed 24.42 on March 28, 2017. On June 19, 2017 it was 28.86 in August 15, 2017 it was 30.84. Postoperatively, the PSA was 1.69 on December 5, 2017 and 1.06 on September 9, 2019. It was 1.53 on October 1, 2019 and 2.64 on January 7, 2020.\par He had a PET CT done, revealed no evidence of mets.\par Feels well, no pains noted. Urine flow is good, has incontinence. No blood, no dysuria. Nocturia x 2. Wears a pad during the day. He has been recommended a Cunningham \par clamp. \par \par 5/19/20...Feels fine at this time, last seen 3 months ago. No pains. Urine flow is good, better sitting compared with standing. He has urgency, no incontinence. No blood, no dysuria. Nocturia 2-3 times, senses some frequency during the day. No back pains, no weakness. No edema of the legs. Appetite is normal, weight is stable. No fevers, nom chills, no recent infections. No fatigue. No cough, no GRAFF. \par \par 8/18/20...Feels well. No pains. No hot flushes. No fatigue. Urine flow is good. has incontinence with change in positions. wears a pad. No blood, no dysuria. Nocturia x 1-3 variable at times. he feels that if he eats protein for dinner he gets up more. No cough, No GRAFF, No edema. Appetite has been good, weight stable. No coronavirus infection. Lives with a roommate. Would like to have a COVID antibody test. \par \par 12/15/20...Due Eligard today. , live. He was seen by Dr Metz via video link. I believe this as for an artificial sphincter discussion, but he does not have good understanding. No pains. Appetite is good, no weight loss. No hot flushes. No fatigue. Urine stream is weak and urine sprays, has to sit to void. Nocturia  occasionally. Wears a pad. NO blood, no dysuria. No edema. No pains, no cough, no dyspnea. No fevers, no chills. Independent in ADls. 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 he had an artificial sphincter revision in January. Flow is better, with full control at this time. Nocturia is less, now at 2-3, prior 5. No dysuria. no blood. Appetite is good, no weight loss. Weight is up 3 pounds form last visit. No coronavirus vaccine as yet, unable to get an appt. No edema.no cough, no GRAFF. No fevers, no chills. No pains in the bones. No hot flushes. getting his Eligard today. No N/V/D/C. had colonoscopy and EGD, no issues. \par \par 6/9/21 - Eligard today. Feels well. seeing cardiologist next week for a check up, no issues. Has some knee arthritis, mostly when he is seated, not an issue with walking. Present gradually since 2013. NO meds used. Had CV vaccine. No hot flushes from the Eligard. No edema. no chest pains/palpitations. no chest pressure. Appetite is good, weight is stable. Urine flow is good, Has a new artificial sphincter, with no issues at all. No dysuria, no blood. No incontinence. Nocturia variable. \par \par 9/28/21 - went to derm as hr had some lesions removed. he then had cryo of the lesions./ Feels well overall. NO pains. Urine flow is improved when he sits, not strong when standing. Nocturia x 2, no incontinence. . No blood, no dysuria. No hot flushes, no significant fatigue. Appetite is good. No edema . No cough, no GRAFF. No chest pains/pressure.\par Derm note reviewed, they were SKs.  \par \par 2/2/22, rescheduled from 12/28/21...Using [place] video . "I'm fine". No pains. No fatigue. No hot flushes. Appetite is good, weight is stable. Urine flow is good, no incontinence unless with lifting. Nocturia x 1-2, improved. No blood, no dysuria. No edema. No chest pain/pressure. No cough, no GRAFF. No fevers, no chills. He did have a fever last month and cancelled his appt one month ago. Had his Covid booster shot. he was not tested, had rhinorrhea and fever with cough. No pains noted. \par \par 5/11/22...had shingles in April. Started on atenolol. rash resolved, has residual pain except for an episode of stabbing pain last week for one day. He feels fine. However, he does experience some pain at night in the area, a pinch like discomfort. C/W post herpetic neuralgia. Appetite is good, dropped a few pounds, trying to lose weight. No hot flushes noted. Urine flow is good, sits to void, no incontinence he says, then describes a Cunningham clamp. Nocturia 4-6 times, more since the pain in the right thigh area. feels somewhat different after the shingles, feels he has more pain when sitting and when more active. No cough, no GRAFF. No chest pain/pressure/palpitations. No edema. No pains in the bones. No N/V/D/C. \par \par 8/10/22...summary to date: John 9 disease documented in early 2017.  He had a prior negative biopsy.  His PSA was up to 30.  An MRI was performed.  He underwent a radical prostatectomy and this revealed John 8 (4+4) with tertiary pattern 5.  1 of 3 lymph nodes resected was positive for tumor with a metastatic deposit of 9 mm in size.  In the postoperative setting, his PSA was detectable and he was referred for radiation therapy.  He was initially started on Lupron in addition to radiation therapy with a plan for 3 years of treatment, but insurance denied that plan and he received at least 1 dose for 6 months.  In January 2020, he had a PSA of 2.64 and he had a PET scan performed.  He remains on Eligard and his PSA has been undetectable since December 15, 2020.  He has not had any recent imaging since the PET scan in January 2020.  2 areas of focal activity was seen, 1 in each inguinal region associated with normal-sized lymph nodes.  This was an [facility] PET/CT..  There was no clear evidence of metastatic disease.\par  utilized.  He reports that he feels well.  He has no pains.  He is due for Lupron today.  He does not have any hot flushes.  He denies any fatigue.  There were no headaches or dizziness.  His appetite is good and there is no weight loss.  He has no cough or shortness of breath.  There were no GI complaints.  Urinary flow is normal.  Nocturia occurs approximately once per night.  There is no incontinence or urgency.  There is no dysuria or hematuria.  He reports no edema.  There is no chest pain chest pressure or palpitations.\par  [de-identified] : John 4+4, tertiary pattern 5 at prostatectomy [de-identified] : 11/9/22 - continues on Lupron monotherapy.  not available for today's visit but pt able to read lips well. Pt states is feeling well. Offers no complaints. Nocturia 1-2. .  He denies any hot flushes.  He denies any fatigue.  There were no headaches or dizziness.  His appetite is good and there is no weight loss.  He has no cough or shortness of breath.  There were no GI complaints.  Urinary flow is normal.  Nocturia occurs approximately once per night.  There is no incontinence or urgency.  There is no dysuria or hematuria.  He reports no edema.  There is no chest pain chest pressure or palpitations. No new medications or changes. States he has started traveling and has been enjoying visiting family.

## 2022-11-19 NOTE — REASON FOR VISIT
[Follow-Up Visit] : a follow-up [Other: ______] : provided by ANJANA [FreeTextEntry2] : prostate cancer [Interpreters_IDNumber] : [id number] [Interpreters_FullName] : Tresa

## 2023-01-25 ENCOUNTER — OUTPATIENT (OUTPATIENT)
Dept: OUTPATIENT SERVICES | Facility: HOSPITAL | Age: 68
LOS: 1 days | Discharge: HOME | End: 2023-01-25

## 2023-01-25 DIAGNOSIS — K40.90 UNILATERAL INGUINAL HERNIA, WITHOUT OBSTRUCTION OR GANGRENE, NOT SPECIFIED AS RECURRENT: Chronic | ICD-10-CM

## 2023-01-25 DIAGNOSIS — C61 MALIGNANT NEOPLASM OF PROSTATE: ICD-10-CM

## 2023-01-27 PROBLEM — Z92.21 HISTORY OF ANTINEOPLASTIC THERAPY: Status: ACTIVE | Noted: 2022-08-04

## 2023-02-01 ENCOUNTER — RESULT REVIEW (OUTPATIENT)
Age: 68
End: 2023-02-01

## 2023-02-01 ENCOUNTER — APPOINTMENT (OUTPATIENT)
Dept: HEMATOLOGY ONCOLOGY | Facility: CLINIC | Age: 68
End: 2023-02-01
Payer: MEDICARE

## 2023-02-01 ENCOUNTER — APPOINTMENT (OUTPATIENT)
Dept: INFUSION THERAPY | Facility: HOSPITAL | Age: 68
End: 2023-02-01

## 2023-02-01 VITALS
SYSTOLIC BLOOD PRESSURE: 126 MMHG | RESPIRATION RATE: 16 BRPM | HEIGHT: 71.22 IN | DIASTOLIC BLOOD PRESSURE: 85 MMHG | OXYGEN SATURATION: 96 % | BODY MASS INDEX: 29.79 KG/M2 | TEMPERATURE: 97.2 F | WEIGHT: 215.17 LBS | HEART RATE: 62 BPM

## 2023-02-01 DIAGNOSIS — Z92.21 PERSONAL HISTORY OF ANTINEOPLASTIC CHEMOTHERAPY: ICD-10-CM

## 2023-02-01 LAB
BASOPHILS # BLD AUTO: 0.02 K/UL — SIGNIFICANT CHANGE UP (ref 0–0.2)
BASOPHILS NFR BLD AUTO: 0.4 % — SIGNIFICANT CHANGE UP (ref 0–2)
EOSINOPHIL # BLD AUTO: 0.1 K/UL — SIGNIFICANT CHANGE UP (ref 0–0.5)
EOSINOPHIL NFR BLD AUTO: 1.9 % — SIGNIFICANT CHANGE UP (ref 0–6)
HCT VFR BLD CALC: 42.7 % — SIGNIFICANT CHANGE UP (ref 39–50)
HGB BLD-MCNC: 13.9 G/DL — SIGNIFICANT CHANGE UP (ref 13–17)
IMM GRANULOCYTES NFR BLD AUTO: 0.6 % — SIGNIFICANT CHANGE UP (ref 0–0.9)
LYMPHOCYTES # BLD AUTO: 1.7 K/UL — SIGNIFICANT CHANGE UP (ref 1–3.3)
LYMPHOCYTES # BLD AUTO: 33.1 % — SIGNIFICANT CHANGE UP (ref 13–44)
MCHC RBC-ENTMCNC: 30.1 PG — SIGNIFICANT CHANGE UP (ref 27–34)
MCHC RBC-ENTMCNC: 32.6 G/DL — SIGNIFICANT CHANGE UP (ref 32–36)
MCV RBC AUTO: 92.4 FL — SIGNIFICANT CHANGE UP (ref 80–100)
MONOCYTES # BLD AUTO: 0.62 K/UL — SIGNIFICANT CHANGE UP (ref 0–0.9)
MONOCYTES NFR BLD AUTO: 12.1 % — SIGNIFICANT CHANGE UP (ref 2–14)
NEUTROPHILS # BLD AUTO: 2.66 K/UL — SIGNIFICANT CHANGE UP (ref 1.8–7.4)
NEUTROPHILS NFR BLD AUTO: 51.9 % — SIGNIFICANT CHANGE UP (ref 43–77)
NRBC # BLD: 0 /100 WBCS — SIGNIFICANT CHANGE UP (ref 0–0)
PLATELET # BLD AUTO: 196 K/UL — SIGNIFICANT CHANGE UP (ref 150–400)
RBC # BLD: 4.62 M/UL — SIGNIFICANT CHANGE UP (ref 4.2–5.8)
RBC # FLD: 12.5 % — SIGNIFICANT CHANGE UP (ref 10.3–14.5)
WBC # BLD: 5.13 K/UL — SIGNIFICANT CHANGE UP (ref 3.8–10.5)
WBC # FLD AUTO: 5.13 K/UL — SIGNIFICANT CHANGE UP (ref 3.8–10.5)

## 2023-02-01 PROCEDURE — 99214 OFFICE O/P EST MOD 30 MIN: CPT

## 2023-02-01 NOTE — REASON FOR VISIT
[Follow-Up Visit] : a follow-up [Other: ______] : provided by ANJANA [FreeTextEntry2] : prostate cancer [Interpreters_IDNumber] : [id number] [Interpreters_FullName] : Tresa

## 2023-02-01 NOTE — PHYSICAL EXAM
[Fully active, able to carry on all pre-disease performance without restriction] : Status 0 - Fully active, able to carry on all pre-disease performance without restriction [Normal] : affect appropriate [de-identified] : no edema [de-identified] : no gynecomastia

## 2023-02-01 NOTE — HISTORY OF PRESENT ILLNESS
[Disease: _____________________] : Disease: [unfilled] [T: ___] : T[unfilled] [N: ___] : N[unfilled] [M: ___] : M[unfilled] [AJCC Stage: ____] : AJCC Stage: [unfilled] [de-identified] : Mr. Turk is seen in consultation on February 11, 2020. In 2017, he was found to have a markedly elevated PSA. PSA was 30.8. \par He had a history of prior negative prostate biopsy in (2015). He had an MRI performed which revealed clinically significant disease. He underwent a targeted biopsy which revealed [place] 9 in the left mid zone. Tumor was grade group 5 involving 80% of one core. Another biopsy in the same zone was grade group for involving 25% of one core. The left apex showed adenocarcinoma of prostate, grade group 4 involving 95% and 30%, of 2 out of 2 cores. All other cores were negative. CT-guided core biopsy was performed of the right iliac bone, which did not reveal evidence of metastases. He underwent a radical prostatectomy in the latter part of 2017. Adenocarcinoma of the prostate, prognostic grade group for ([place] 4+4 equals 8) with a tertiary pattern 5 was present. Disease was confined to the gland. One regional lymph node of 3 resected was positive for tumor, measuring 9 mm in size.\par His PSA was not undetectable in the postoperative setting, and he was referred for radiation therapy. This was administered from January 22 of 2018 until March 16, 2018. The total dose was 7000 cGy. he received a 6 month Lupron injection post op, (12/12/17) planned for 3 years of treatment, but insurance changes and apparently denied. \par On January 7, 2020, he had a PSA of 2.64. He was referred for a PET scan, and referred for medical oncology consultation.\par PSA values that are available revealed 24.42 on March 28, 2017. On June 19, 2017 it was 28.86 in August 15, 2017 it was 30.84. Postoperatively, the PSA was 1.69 on December 5, 2017 and 1.06 on September 9, 2019. It was 1.53 on October 1, 2019 and 2.64 on January 7, 2020.\par He had a PET CT done, revealed no evidence of mets.\par Feels well, no pains noted. Urine flow is good, has incontinence. No blood, no dysuria. Nocturia x 2. Wears a pad during the day. He has been recommended a Cunningham \par clamp. \par \par 5/19/20...Feels fine at this time, last seen 3 months ago. No pains. Urine flow is good, better sitting compared with standing. He has urgency, no incontinence. No blood, no dysuria. Nocturia 2-3 times, senses some frequency during the day. No back pains, no weakness. No edema of the legs. Appetite is normal, weight is stable. No fevers, nom chills, no recent infections. No fatigue. No cough, no GRAFF. \par \par 8/18/20...Feels well. No pains. No hot flushes. No fatigue. Urine flow is good. has incontinence with change in positions. wears a pad. No blood, no dysuria. Nocturia x 1-3 variable at times. he feels that if he eats protein for dinner he gets up more. No cough, No GRAFF, No edema. Appetite has been good, weight stable. No coronavirus infection. Lives with a roommate. Would like to have a COVID antibody test. \par \par 12/15/20...Due Eligard today. , live. He was seen by Dr Metz via video link. I believe this as for an artificial sphincter discussion, but he does not have good understanding. No pains. Appetite is good, no weight loss. No hot flushes. No fatigue. Urine stream is weak and urine sprays, has to sit to void. Nocturia  occasionally. Wears a pad. NO blood, no dysuria. No edema. No pains, no cough, no dyspnea. No fevers, no chills. Independent in ADls. 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 he had an artificial sphincter revision in January. Flow is better, with full control at this time. Nocturia is less, now at 2-3, prior 5. No dysuria. no blood. Appetite is good, no weight loss. Weight is up 3 pounds form last visit. No coronavirus vaccine as yet, unable to get an appt. No edema.no cough, no GRAFF. No fevers, no chills. No pains in the bones. No hot flushes. getting his Eligard today. No N/V/D/C. had colonoscopy and EGD, no issues. \par \par 6/9/21 - Eligard today. Feels well. seeing cardiologist next week for a check up, no issues. Has some knee arthritis, mostly when he is seated, not an issue with walking. Present gradually since 2013. NO meds used. Had CV vaccine. No hot flushes from the Eligard. No edema. no chest pains/palpitations. no chest pressure. Appetite is good, weight is stable. Urine flow is good, Has a new artificial sphincter, with no issues at all. No dysuria, no blood. No incontinence. Nocturia variable. \par \par 9/28/21 - went to derm as hr had some lesions removed. he then had cryo of the lesions./ Feels well overall. NO pains. Urine flow is improved when he sits, not strong when standing. Nocturia x 2, no incontinence. . No blood, no dysuria. No hot flushes, no significant fatigue. Appetite is good. No edema . No cough, no GRAFF. No chest pains/pressure.\par Derm note reviewed, they were SKs.  \par \par 2/2/22, rescheduled from 12/28/21...Using [place] video . "I'm fine". No pains. No fatigue. No hot flushes. Appetite is good, weight is stable. Urine flow is good, no incontinence unless with lifting. Nocturia x 1-2, improved. No blood, no dysuria. No edema. No chest pain/pressure. No cough, no GRAFF. No fevers, no chills. He did have a fever last month and cancelled his appt one month ago. Had his Covid booster shot. he was not tested, had rhinorrhea and fever with cough. No pains noted. \par \par 5/11/22...had shingles in April. Started on atenolol. rash resolved, has residual pain except for an episode of stabbing pain last week for one day. He feels fine. However, he does experience some pain at night in the area, a pinch like discomfort. C/W post herpetic neuralgia. Appetite is good, dropped a few pounds, trying to lose weight. No hot flushes noted. Urine flow is good, sits to void, no incontinence he says, then describes a Cunningham clamp. Nocturia 4-6 times, more since the pain in the right thigh area. feels somewhat different after the shingles, feels he has more pain when sitting and when more active. No cough, no GRAFF. No chest pain/pressure/palpitations. No edema. No pains in the bones. No N/V/D/C. \par \par 8/10/22...summary to date: John 9 disease documented in early 2017.  He had a prior negative biopsy.  His PSA was up to 30.  An MRI was performed.  He underwent a radical prostatectomy and this revealed [place] 8 (4+4) with tertiary pattern 5.  1 of 3 lymph nodes resected was positive for tumor with a metastatic deposit of 9 mm in size.  In the postoperative setting, his PSA was detectable and he was referred for radiation therapy.  He was initially started on Lupron in addition to radiation therapy with a plan for 3 years of treatment, but insurance denied that plan and he received at least 1 dose for 6 months.  In January 2020, he had a PSA of 2.64 and he had a PET scan performed.  He remains on Eligard and his PSA has been undetectable since December 15, 2020.  He has not had any recent imaging since the PET scan in January 2020.  2 areas of focal activity was seen, 1 in each inguinal region associated with normal-sized lymph nodes.  This was an [facility] PET/CT..  There was no clear evidence of metastatic disease.\par  utilized.  He reports that he feels well.  He has no pains.  He is due for Lupron today.  He does not have any hot flushes.  He denies any fatigue.  There were no headaches or dizziness.  His appetite is good and there is no weight loss.  He has no cough or shortness of breath.  There were no GI complaints.  Urinary flow is normal.  Nocturia occurs approximately once per night.  There is no incontinence or urgency.  There is no dysuria or hematuria.  He reports no edema.  There is no chest pain chest pressure or palpitations.\par \par 11/9/22 - continues on Lupron monotherapy.  not available for today's visit but pt able to read lips well. Pt states is feeling well. Offers no complaints. Nocturia 1-2. .  He denies any hot flushes.  He denies any fatigue.  There were no headaches or dizziness.  His appetite is good and there is no weight loss.  He has no cough or shortness of breath.  There were no GI complaints.  Urinary flow is normal.  Nocturia occurs approximately once per night.  There is no incontinence or urgency.  There is no dysuria or hematuria.  He reports no edema.  There is no chest pain chest pressure or palpitations. No new medications or changes. States he has started traveling and has been enjoying visiting family. [de-identified] : John 4+4, tertiary pattern 5 at prostatectomy [de-identified] : 2/1/23 - continues on Lupron monotherapy. [place] 8, bone mets. PSA undetectable since December 2020. No recent imaging, last DEXA August 2022, normal. Overall feeling "good". Notes intermittent dizziness and vertigo approx twice a year when he feels "everything is moving" and very off balance, he has to close his eyes and lie down, it resolves after a day or so. urine flow is normal, nocturia x 2. No incontinence, NO blood, no dysuria. No hot flushes, no fatigue. Appetite is good, no weight loss. No headaches. No paresthesias. No N/V/D./C. No edema. No chest pain/pressure/palpitations.  Has some acid reflux at times. NO fatigue

## 2023-02-01 NOTE — REVIEW OF SYSTEMS
[Loss of Hearing] : loss of hearing [Negative] : Genitourinary [Fever] : no fever [Chills] : no chills [Fatigue] : no fatigue [Recent Change In Weight] : ~T no recent weight change [Dysphagia] : no dysphagia [Odynophagia] : no odynophagia [Chest Pain] : no chest pain [Palpitations] : no palpitations [Lower Ext Edema] : no lower extremity edema [Wheezing] : no wheezing [Cough] : no cough [SOB on Exertion] : no shortness of breath during exertion [Joint Pain] : no joint pain [Joint Stiffness] : no joint stiffness [Skin Rash] : no skin rash [Dizziness] : no dizziness [Anxiety] : no anxiety [Depression] : no depression [Hot Flashes] : no hot flashes [Muscle Weakness] : no muscle weakness [Easy Bleeding] : no tendency for easy bleeding [Easy Bruising] : no tendency for easy bruising [FreeTextEntry9] : no cramps [de-identified] : No HA, no paresthesias

## 2023-02-01 NOTE — ASSESSMENT
[Palliative Care Plan] : not applicable at this time [FreeTextEntry1] : Hardik is seen in the office in follow-up today.  He is seen with the assistance of an .  He continues on Lupron monotherapy.  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  His PSA has been undetectable since December 2020.  He was first seen by me in February 2020.  In 2017 he was found to have a markedly elevated PSA at 30.8.\par \par His last DEXA scan was in August 2022 and it was normal.  Overall, he states that he is feeling "good".  He notes some intermittent dizziness and vertigo symptoms approximately twice per year and he feels as if "everything is moving" he also notes balance issues at that time.  He has to close his eyes and lie down.  It resolves after a day or so.  This has not been investigated, and I asked him to see his primary care doctor in regards to this issue.  Urinary flow is normal.  Nocturia occurs twice.  There is no incontinence hematuria or dysuria.  There are no hot flushes.  He denies any fatigue.  His appetite is good and there is no weight loss.  He denies any headaches or paresthesias.  There were no GI complaints.  There is no edema of the extremities.  There is no chest pain chest pressure or palpitations.  He does have occasional symptoms of acid reflux.  He denies any fatigue.\par \par On physical examination, he appears well and in no acute distress.  His performance status is 0.  His blood pressure was 126/85 and his weight is 97.6 kg which is more or less his average weight.  The HEENT examination is normal.  There is no palpable adenopathy present.  The lungs are clear and the heart examination is normal.  There is no gynecomastia.  The abdominal examination is normal.  There is no spinal column or chest wall tenderness to palpation or percussion.  There is no edema of the extremities.\par \par A PET look [facility] scan showed no definitive evidence of metastatic disease in January 2020.  At the time of his prostatectomy, 1 of 3 lymph nodes were positive for metastatic carcinoma.  He had John score of 8 with tertiary pattern 5.  Tumor involves less than 50% of the gland.  Margins were involved by invasive carcinoma.  There was no seminal vesicle invasion.  Although the margins were involved, the pathologist said that this was a pathologic T2, organ confined.  There was a metastasis in the regional lymph node.  A bone biopsy was performed on a sclerotic lesion in the right iliac bone prior to the prostatectomy and this was negative for malignancy.\par \par Today's CBC has been reported, and its normal.  The chemistry panel and PSA are pending.  He received his Lupron injection today.  I will see him once again in 1 month's time for his next visit as well as his next Lupron injection.  All questions were answered to the best of my ability and to his apparent satisfaction.\par \par \par

## 2023-02-02 LAB
ALBUMIN SERPL ELPH-MCNC: 4.2 G/DL
ALP BLD-CCNC: 79 U/L
ALT SERPL-CCNC: 20 U/L
ANION GAP SERPL CALC-SCNC: 11 MMOL/L
AST SERPL-CCNC: 22 U/L
BILIRUB SERPL-MCNC: 0.4 MG/DL
BUN SERPL-MCNC: 25 MG/DL
CALCIUM SERPL-MCNC: 9.8 MG/DL
CHLORIDE SERPL-SCNC: 104 MMOL/L
CO2 SERPL-SCNC: 27 MMOL/L
CREAT SERPL-MCNC: 1.18 MG/DL
EGFR: 67 ML/MIN/1.73M2
GLUCOSE SERPL-MCNC: 103 MG/DL
POTASSIUM SERPL-SCNC: 4.7 MMOL/L
PROT SERPL-MCNC: 6.9 G/DL
PSA SERPL-MCNC: 0.03 NG/ML
SODIUM SERPL-SCNC: 141 MMOL/L

## 2023-04-18 ENCOUNTER — APPOINTMENT (OUTPATIENT)
Dept: UROLOGY | Facility: CLINIC | Age: 68
End: 2023-04-18
Payer: MEDICARE

## 2023-04-18 VITALS
DIASTOLIC BLOOD PRESSURE: 84 MMHG | TEMPERATURE: 96.9 F | SYSTOLIC BLOOD PRESSURE: 133 MMHG | HEART RATE: 67 BPM | OXYGEN SATURATION: 95 %

## 2023-04-18 DIAGNOSIS — N39.3 STRESS INCONTINENCE (FEMALE) (MALE): ICD-10-CM

## 2023-04-18 PROCEDURE — 99213 OFFICE O/P EST LOW 20 MIN: CPT

## 2023-04-18 NOTE — ASSESSMENT
[FreeTextEntry1] : \par \par Impression/plan: 67-year-old gentleman who is now 2 years status post artificial urinary sphincter for postprostatic incontinence.  Continues to enjoy continence, very happy with outcome of surgery.  At this point he can follow-up with me on an as-needed basis.  I did explain to him that the device does have a lifespan typically lasting approximately 8 to 9 years, he is aware.  Questions answered.

## 2023-04-18 NOTE — HISTORY OF PRESENT ILLNESS
[FreeTextEntry1] : 66-year-old gentleman who is now 1 year status post artificial urinary sphincter. We used a  for the appointment today. He states he is ecstatic with the outcome of the surgery. He is dry and able to live a normal life. He wears a liner for backup. \par \par 4/18/23\par \par 67-year-old patient who is here today for follow-up from all of his urinary sphincter placement 2 years ago.  We used a  for the appointment today.  He continues to be very happy with the outcome of his procedure.  He denies any significant incontinence.  He wears a pad for a backup.  Otherwise overall continues to enjoy benefits of the artificial urinary sphincter.

## 2023-04-18 NOTE — LETTER BODY
[Dear  ___] : Dear  [unfilled], [Courtesy Letter:] : I had the pleasure of seeing your patient, [unfilled], in my office today. [Please see my note below.] : Please see my note below. [Consult Closing:] : Thank you very much for allowing me to participate in the care of this patient.  If you have any questions, please do not hesitate to contact me. [Sincerely,] : Sincerely, [FreeTextEntry3] : Memo Wadsworth MD\par System Director Urogynecology/FPMRS\par Department of Urology\par [facility] \par   at [facility]\par  of Urology\par [facility] [employer] [facility] at [facility]\par

## 2023-04-19 ENCOUNTER — OUTPATIENT (OUTPATIENT)
Dept: OUTPATIENT SERVICES | Facility: HOSPITAL | Age: 68
LOS: 1 days | Discharge: HOME | End: 2023-04-19

## 2023-04-19 DIAGNOSIS — K40.90 UNILATERAL INGUINAL HERNIA, WITHOUT OBSTRUCTION OR GANGRENE, NOT SPECIFIED AS RECURRENT: Chronic | ICD-10-CM

## 2023-04-19 DIAGNOSIS — C61 MALIGNANT NEOPLASM OF PROSTATE: ICD-10-CM

## 2023-04-25 ENCOUNTER — APPOINTMENT (OUTPATIENT)
Dept: HEMATOLOGY ONCOLOGY | Facility: CLINIC | Age: 68
End: 2023-04-25
Payer: MEDICARE

## 2023-04-25 ENCOUNTER — RESULT REVIEW (OUTPATIENT)
Age: 68
End: 2023-04-25

## 2023-04-25 ENCOUNTER — APPOINTMENT (OUTPATIENT)
Dept: INFUSION THERAPY | Facility: HOSPITAL | Age: 68
End: 2023-04-25

## 2023-04-25 VITALS
HEIGHT: 71.3 IN | DIASTOLIC BLOOD PRESSURE: 79 MMHG | SYSTOLIC BLOOD PRESSURE: 134 MMHG | RESPIRATION RATE: 16 BRPM | HEART RATE: 55 BPM | OXYGEN SATURATION: 97 % | WEIGHT: 216.27 LBS | BODY MASS INDEX: 29.94 KG/M2 | TEMPERATURE: 97.2 F

## 2023-04-25 LAB
BASOPHILS # BLD AUTO: 0.03 K/UL — SIGNIFICANT CHANGE UP (ref 0–0.2)
BASOPHILS NFR BLD AUTO: 0.6 % — SIGNIFICANT CHANGE UP (ref 0–2)
EOSINOPHIL # BLD AUTO: 0.11 K/UL — SIGNIFICANT CHANGE UP (ref 0–0.5)
EOSINOPHIL NFR BLD AUTO: 2.2 % — SIGNIFICANT CHANGE UP (ref 0–6)
HCT VFR BLD CALC: 40.9 % — SIGNIFICANT CHANGE UP (ref 39–50)
HGB BLD-MCNC: 13.6 G/DL — SIGNIFICANT CHANGE UP (ref 13–17)
IMM GRANULOCYTES NFR BLD AUTO: 0.2 % — SIGNIFICANT CHANGE UP (ref 0–0.9)
LYMPHOCYTES # BLD AUTO: 1.68 K/UL — SIGNIFICANT CHANGE UP (ref 1–3.3)
LYMPHOCYTES # BLD AUTO: 33.6 % — SIGNIFICANT CHANGE UP (ref 13–44)
MCHC RBC-ENTMCNC: 30.7 PG — SIGNIFICANT CHANGE UP (ref 27–34)
MCHC RBC-ENTMCNC: 33.3 G/DL — SIGNIFICANT CHANGE UP (ref 32–36)
MCV RBC AUTO: 92.3 FL — SIGNIFICANT CHANGE UP (ref 80–100)
MONOCYTES # BLD AUTO: 0.42 K/UL — SIGNIFICANT CHANGE UP (ref 0–0.9)
MONOCYTES NFR BLD AUTO: 8.4 % — SIGNIFICANT CHANGE UP (ref 2–14)
NEUTROPHILS # BLD AUTO: 2.75 K/UL — SIGNIFICANT CHANGE UP (ref 1.8–7.4)
NEUTROPHILS NFR BLD AUTO: 55 % — SIGNIFICANT CHANGE UP (ref 43–77)
NRBC # BLD: 0 /100 WBCS — SIGNIFICANT CHANGE UP (ref 0–0)
PLATELET # BLD AUTO: 176 K/UL — SIGNIFICANT CHANGE UP (ref 150–400)
RBC # BLD: 4.43 M/UL — SIGNIFICANT CHANGE UP (ref 4.2–5.8)
RBC # FLD: 12.4 % — SIGNIFICANT CHANGE UP (ref 10.3–14.5)
WBC # BLD: 5 K/UL — SIGNIFICANT CHANGE UP (ref 3.8–10.5)
WBC # FLD AUTO: 5 K/UL — SIGNIFICANT CHANGE UP (ref 3.8–10.5)

## 2023-04-25 PROCEDURE — 99214 OFFICE O/P EST MOD 30 MIN: CPT

## 2023-04-25 RX ORDER — KETOCONAZOLE 20 MG/G
2 CREAM TOPICAL
Qty: 1 | Refills: 2 | Status: DISCONTINUED | COMMUNITY
Start: 2021-11-16 | End: 2023-04-25

## 2023-04-25 NOTE — HISTORY OF PRESENT ILLNESS
[de-identified] : Mr. Turk is seen in consultation on February 11, 2020. In 2017, he was found to have a markedly elevated PSA. PSA was 30.8. \par He had a history of prior negative prostate biopsy in (2015). He had an MRI performed which revealed clinically significant disease. He underwent a targeted biopsy which revealed [place] 9 in the left mid zone. Tumor was grade group 5 involving 80% of one core. Another biopsy in the same zone was grade group for involving 25% of one core. The left apex showed adenocarcinoma of prostate, grade group 4 involving 95% and 30%, of 2 out of 2 cores. All other cores were negative. CT-guided core biopsy was performed of the right iliac bone, which did not reveal evidence of metastases. He underwent a radical prostatectomy in the latter part of 2017. Adenocarcinoma of the prostate, prognostic grade group for ([place] 4+4 equals 8) with a tertiary pattern 5 was present. Disease was confined to the gland. One regional lymph node of 3 resected was positive for tumor, measuring 9 mm in size.\par His PSA was not undetectable in the postoperative setting, and he was referred for radiation therapy. This was administered from January 22 of 2018 until March 16, 2018. The total dose was 7000 cGy. he received a 6 month Lupron injection post op, (12/12/17) planned for 3 years of treatment, but insurance changes and apparently denied. \par On January 7, 2020, he had a PSA of 2.64. He was referred for a PET scan, and referred for medical oncology consultation.\par PSA values that are available revealed 24.42 on March 28, 2017. On June 19, 2017 it was 28.86 in August 15, 2017 it was 30.84. Postoperatively, the PSA was 1.69 on December 5, 2017 and 1.06 on September 9, 2019. It was 1.53 on October 1, 2019 and 2.64 on January 7, 2020.\par He had a PET CT done, revealed no evidence of mets.\par Feels well, no pains noted. Urine flow is good, has incontinence. No blood, no dysuria. Nocturia x 2. Wears a pad during the day. He has been recommended a Cunningham \par clamp. \par \par 5/19/20...Feels fine at this time, last seen 3 months ago. No pains. Urine flow is good, better sitting compared with standing. He has urgency, no incontinence. No blood, no dysuria. Nocturia 2-3 times, senses some frequency during the day. No back pains, no weakness. No edema of the legs. Appetite is normal, weight is stable. No fevers, nom chills, no recent infections. No fatigue. No cough, no GRAFF. \par \par 8/18/20...Feels well. No pains. No hot flushes. No fatigue. Urine flow is good. has incontinence with change in positions. wears a pad. No blood, no dysuria. Nocturia x 1-3 variable at times. he feels that if he eats protein for dinner he gets up more. No cough, No GRAFF, No edema. Appetite has been good, weight stable. No coronavirus infection. Lives with a roommate. Would like to have a COVID antibody test. \par \par 12/15/20...Due Eligard today. , live. He was seen by Dr Metz via video link. I believe this as for an artificial sphincter discussion, but he does not have good understanding. No pains. Appetite is good, no weight loss. No hot flushes. No fatigue. Urine stream is weak and urine sprays, has to sit to void. Nocturia  occasionally. Wears a pad. NO blood, no dysuria. No edema. No pains, no cough, no dyspnea. No fevers, no chills. Independent in ADls. 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 he had an artificial sphincter revision in January. Flow is better, with full control at this time. Nocturia is less, now at 2-3, prior 5. No dysuria. no blood. Appetite is good, no weight loss. Weight is up 3 pounds form last visit. No coronavirus vaccine as yet, unable to get an appt. No edema.no cough, no GRAFF. No fevers, no chills. No pains in the bones. No hot flushes. getting his Eligard today. No N/V/D/C. had colonoscopy and EGD, no issues. \par \par 6/9/21 - Eligard today. Feels well. seeing cardiologist next week for a check up, no issues. Has some knee arthritis, mostly when he is seated, not an issue with walking. Present gradually since 2013. NO meds used. Had CV vaccine. No hot flushes from the Eligard. No edema. no chest pains/palpitations. no chest pressure. Appetite is good, weight is stable. Urine flow is good, Has a new artificial sphincter, with no issues at all. No dysuria, no blood. No incontinence. Nocturia variable. \par \par 9/28/21 - went to derm as hr had some lesions removed. he then had cryo of the lesions./ Feels well overall. NO pains. Urine flow is improved when he sits, not strong when standing. Nocturia x 2, no incontinence. . No blood, no dysuria. No hot flushes, no significant fatigue. Appetite is good. No edema . No cough, no GRAFF. No chest pains/pressure.\par Derm note reviewed, they were SKs.  \par \par 2/2/22, rescheduled from 12/28/21...Using [place] video . "I'm fine". No pains. No fatigue. No hot flushes. Appetite is good, weight is stable. Urine flow is good, no incontinence unless with lifting. Nocturia x 1-2, improved. No blood, no dysuria. No edema. No chest pain/pressure. No cough, no GRAFF. No fevers, no chills. He did have a fever last month and cancelled his appt one month ago. Had his Covid booster shot. he was not tested, had rhinorrhea and fever with cough. No pains noted. \par \par 5/11/22...had shingles in April. Started on atenolol. rash resolved, has residual pain except for an episode of stabbing pain last week for one day. He feels fine. However, he does experience some pain at night in the area, a pinch like discomfort. C/W post herpetic neuralgia. Appetite is good, dropped a few pounds, trying to lose weight. No hot flushes noted. Urine flow is good, sits to void, no incontinence he says, then describes a Cunningham clamp. Nocturia 4-6 times, more since the pain in the right thigh area. feels somewhat different after the shingles, feels he has more pain when sitting and when more active. No cough, no GRAFF. No chest pain/pressure/palpitations. No edema. No pains in the bones. No N/V/D/C. \par \par 8/10/22...summary to date: John 9 disease documented in early 2017.  He had a prior negative biopsy.  His PSA was up to 30.  An MRI was performed.  He underwent a radical prostatectomy and this revealed [place] 8 (4+4) with tertiary pattern 5.  1 of 3 lymph nodes resected was positive for tumor with a metastatic deposit of 9 mm in size.  In the postoperative setting, his PSA was detectable and he was referred for radiation therapy.  He was initially started on Lupron in addition to radiation therapy with a plan for 3 years of treatment, but insurance denied that plan and he received at least 1 dose for 6 months.  In January 2020, he had a PSA of 2.64 and he had a PET scan performed.  He remains on Eligard and his PSA has been undetectable since December 15, 2020.  He has not had any recent imaging since the PET scan in January 2020.  2 areas of focal activity was seen, 1 in each inguinal region associated with normal-sized lymph nodes.  This was an Axumin PET/CT..  There was no clear evidence of metastatic disease.\par  utilized.  He reports that he feels well.  He has no pains.  He is due for Lupron today.  He does not have any hot flushes.  He denies any fatigue.  There were no headaches or dizziness.  His appetite is good and there is no weight loss.  He has no cough or shortness of breath.  There were no GI complaints.  Urinary flow is normal.  Nocturia occurs approximately once per night.  There is no incontinence or urgency.  There is no dysuria or hematuria.  He reports no edema.  There is no chest pain chest pressure or palpitations.\par \par 11/9/22 - continues on Lupron monotherapy.  not available for today's visit but pt able to read lips well. Pt states is feeling well. Offers no complaints. Nocturia 1-2. .  He denies any hot flushes.  He denies any fatigue.  There were no headaches or dizziness.  His appetite is good and there is no weight loss.  He has no cough or shortness of breath.  There were no GI complaints.  Urinary flow is normal.  Nocturia occurs approximately once per night.  There is no incontinence or urgency.  There is no dysuria or hematuria.  He reports no edema.  There is no chest pain chest pressure or palpitations. No new medications or changes. States he has started traveling and has been enjoying visiting family.\par \par 2/1/23 - continues on Lupron monotherapy. [place] 8, bone mets. PSA undetectable since December 2020. No recent imaging, last DEXA August 2022, normal. Overall feeling "good". Notes intermittent dizziness and vertigo approx twice a year when he feels "everything is moving" and very off balance, he has to close his eyes and lie down, it resolves after a day or so. urine flow is normal, nocturia x 2. No incontinence, NO blood, no dysuria. No hot flushes, no fatigue. Appetite is good, no weight loss. No headaches. No paresthesias. No N/V/D./C. No edema. No chest pain/pressure/palpitations.  Has some acid reflux at times. NO fatigue [de-identified] : John 4+4, tertiary pattern 5 at prostatectomy [de-identified] : 4/25/23 remains on Lupron monotherapy. [place] 8, bone mets. PSA undetectable since December 2020. No recent imaging, last DEXA  August 2022, normal.  Lupron today.  feels well, feet are much better, he was having pains i his ankles, resolved. saw a podiatrist, had some injections. No gout. pains resolved with the shots. Appetite is good, no weight loss. Hot flushes do not occur. No fatigue. No headaches, no dizziness. NO chest pain/pressure/palpitations.  No cough, No GRAFF. Urine flow is normal, nocturia x 1. No blood, no dysuria. NO incontinence, NO urgency. No N/V/D/C. No health issues since last visit. Has artificial sphincter seen in follow up by Dr Metz recently, scoped and all was well, note reviewed.

## 2023-04-25 NOTE — ASSESSMENT
[FreeTextEntry1] : Isaac is seen in the office in follow-up today.  We were assisted by an .  He is on Lupron monotherapy for [place] 8 prostate cancer with previous bone mets.  His PSA has been undetectable since December 2020.  He has had no recent imaging given the undetectable value.  His last DEXA scan was in August 2022 with normal findings.  He receives his Lupron today.  He feels well.  He was having some pain in his feet and ankles.  He says they are much better at this time.  He saw a podiatrist and he had some injections performed.  He was not told that it was gout.  The pain is resolved with the shots.  His appetite is good.  There is no weight loss.  Hot flushes did not occur.  There is no fatigue or headaches.  There is no dizziness.  He denies any chest pain chest pressure or palpitations.  There were no respiratory complaints.  Urinary flow is normal, nocturia occurs once.  There is no hematuria or dysuria.  There is no urgency or incontinence.  He has an artificial sphincter and he was seen in follow-up by Dr. Metz recently.  He was apparently scoped, and he says that all was normal.\par \par On physical examination, he appears well.  His performance status is 0.  His blood pressure was 134/79.  His weight was 98.1 kg, which is stable.  There is no notable physical findings on examination other than some edema in the distal extremities bilaterally at the ankles, pitting, 2+.\par \par Today CBC was.  The chemistry and PSA is pending.  He received his Lupron injection today.  I will see him once again in 3 months time at the time of his next Lupron injection.  I will contact him with the results of his chemistry and PSA value once they have been reported.  All questions were answered to the best my ability and to his apparent satisfaction. Acute respiratory failure, unspecified whether with hypoxia or hypercapnia

## 2023-04-25 NOTE — PHYSICAL EXAM
[Fully active, able to carry on all pre-disease performance without restriction] : Status 0 - Fully active, able to carry on all pre-disease performance without restriction [Normal] : affect appropriate [de-identified] : some distal edema, bilaterally at ankles, pitting, 2+

## 2023-04-25 NOTE — REVIEW OF SYSTEMS
[Loss of Hearing] : loss of hearing [Hoarseness] : hoarseness [Negative] : Gastrointestinal [Fever] : no fever [Chills] : no chills [Fatigue] : no fatigue [Recent Change In Weight] : ~T no recent weight change [Dysphagia] : no dysphagia [Odynophagia] : no odynophagia [Chest Pain] : no chest pain [Palpitations] : no palpitations [Lower Ext Edema] : no lower extremity edema [Wheezing] : no wheezing [Cough] : no cough [SOB on Exertion] : no shortness of breath during exertion [Dysuria] : no dysuria [Incontinence] : no incontinence [Joint Pain] : no joint pain [Joint Stiffness] : no joint stiffness [Muscle Pain] : no muscle pain [Skin Rash] : no skin rash [Dizziness] : no dizziness [Difficulty Walking] : no difficulty walking [Anxiety] : no anxiety [Depression] : no depression [Hot Flashes] : no hot flashes [Muscle Weakness] : no muscle weakness [Easy Bleeding] : no tendency for easy bleeding [Easy Bruising] : no tendency for easy bruising [FreeTextEntry9] : no cramps [de-identified] : No HA, no paresthesias

## 2023-04-26 LAB
ALBUMIN SERPL ELPH-MCNC: 4.1 G/DL
ALP BLD-CCNC: 65 U/L
ALT SERPL-CCNC: 20 U/L
ANION GAP SERPL CALC-SCNC: 10 MMOL/L
AST SERPL-CCNC: 22 U/L
BILIRUB SERPL-MCNC: 0.4 MG/DL
BUN SERPL-MCNC: 20 MG/DL
CALCIUM SERPL-MCNC: 9.9 MG/DL
CHLORIDE SERPL-SCNC: 103 MMOL/L
CO2 SERPL-SCNC: 26 MMOL/L
CREAT SERPL-MCNC: 1.27 MG/DL
EGFR: 62 ML/MIN/1.73M2
GLUCOSE SERPL-MCNC: 117 MG/DL
POTASSIUM SERPL-SCNC: 4.4 MMOL/L
PROT SERPL-MCNC: 6.7 G/DL
PSA SERPL-MCNC: 0.1 NG/ML
SODIUM SERPL-SCNC: 140 MMOL/L

## 2023-07-11 ENCOUNTER — OUTPATIENT (OUTPATIENT)
Dept: OUTPATIENT SERVICES | Facility: HOSPITAL | Age: 68
LOS: 1 days | Discharge: HOME | End: 2023-07-11

## 2023-07-11 DIAGNOSIS — C61 MALIGNANT NEOPLASM OF PROSTATE: ICD-10-CM

## 2023-07-11 DIAGNOSIS — K40.90 UNILATERAL INGUINAL HERNIA, WITHOUT OBSTRUCTION OR GANGRENE, NOT SPECIFIED AS RECURRENT: Chronic | ICD-10-CM

## 2023-07-18 ENCOUNTER — RESULT REVIEW (OUTPATIENT)
Age: 68
End: 2023-07-18

## 2023-07-18 ENCOUNTER — APPOINTMENT (OUTPATIENT)
Dept: INFUSION THERAPY | Facility: HOSPITAL | Age: 68
End: 2023-07-18

## 2023-07-18 ENCOUNTER — APPOINTMENT (OUTPATIENT)
Dept: HEMATOLOGY ONCOLOGY | Facility: CLINIC | Age: 68
End: 2023-07-18
Payer: MEDICARE

## 2023-07-18 VITALS
HEART RATE: 78 BPM | BODY MASS INDEX: 30.09 KG/M2 | TEMPERATURE: 97.9 F | DIASTOLIC BLOOD PRESSURE: 78 MMHG | OXYGEN SATURATION: 98 % | HEIGHT: 71.1 IN | WEIGHT: 217.35 LBS | SYSTOLIC BLOOD PRESSURE: 124 MMHG | RESPIRATION RATE: 16 BRPM

## 2023-07-18 LAB
BASOPHILS # BLD AUTO: 0.01 K/UL — SIGNIFICANT CHANGE UP (ref 0–0.2)
BASOPHILS NFR BLD AUTO: 0.2 % — SIGNIFICANT CHANGE UP (ref 0–2)
EOSINOPHIL # BLD AUTO: 0.07 K/UL — SIGNIFICANT CHANGE UP (ref 0–0.5)
EOSINOPHIL NFR BLD AUTO: 1.3 % — SIGNIFICANT CHANGE UP (ref 0–6)
HCT VFR BLD CALC: 40.9 % — SIGNIFICANT CHANGE UP (ref 39–50)
HGB BLD-MCNC: 13.6 G/DL — SIGNIFICANT CHANGE UP (ref 13–17)
IMM GRANULOCYTES NFR BLD AUTO: 0.4 % — SIGNIFICANT CHANGE UP (ref 0–0.9)
LYMPHOCYTES # BLD AUTO: 1.29 K/UL — SIGNIFICANT CHANGE UP (ref 1–3.3)
LYMPHOCYTES # BLD AUTO: 24.3 % — SIGNIFICANT CHANGE UP (ref 13–44)
MCHC RBC-ENTMCNC: 30.8 PG — SIGNIFICANT CHANGE UP (ref 27–34)
MCHC RBC-ENTMCNC: 33.3 G/DL — SIGNIFICANT CHANGE UP (ref 32–36)
MCV RBC AUTO: 92.7 FL — SIGNIFICANT CHANGE UP (ref 80–100)
MONOCYTES # BLD AUTO: 0.6 K/UL — SIGNIFICANT CHANGE UP (ref 0–0.9)
MONOCYTES NFR BLD AUTO: 11.3 % — SIGNIFICANT CHANGE UP (ref 2–14)
NEUTROPHILS # BLD AUTO: 3.31 K/UL — SIGNIFICANT CHANGE UP (ref 1.8–7.4)
NEUTROPHILS NFR BLD AUTO: 62.5 % — SIGNIFICANT CHANGE UP (ref 43–77)
NRBC # BLD: 0 /100 WBCS — SIGNIFICANT CHANGE UP (ref 0–0)
PLATELET # BLD AUTO: 172 K/UL — SIGNIFICANT CHANGE UP (ref 150–400)
RBC # BLD: 4.41 M/UL — SIGNIFICANT CHANGE UP (ref 4.2–5.8)
RBC # FLD: 12.4 % — SIGNIFICANT CHANGE UP (ref 10.3–14.5)
WBC # BLD: 5.3 K/UL — SIGNIFICANT CHANGE UP (ref 3.8–10.5)
WBC # FLD AUTO: 5.3 K/UL — SIGNIFICANT CHANGE UP (ref 3.8–10.5)

## 2023-07-18 PROCEDURE — 99213 OFFICE O/P EST LOW 20 MIN: CPT

## 2023-07-18 RX ORDER — OMEPRAZOLE 40 MG/1
40 CAPSULE, DELAYED RELEASE ORAL
Refills: 0 | Status: DISCONTINUED | COMMUNITY
End: 2023-07-18

## 2023-07-18 RX ORDER — VALSARTAN AND HYDROCHLOROTHIAZIDE 160; 12.5 MG/1; MG/1
160-12.5 TABLET, FILM COATED ORAL
Refills: 0 | Status: DISCONTINUED | COMMUNITY
End: 2023-07-18

## 2023-07-18 RX ORDER — PANTOPRAZOLE 40 MG/1
40 TABLET, DELAYED RELEASE ORAL
Refills: 0 | Status: ACTIVE | COMMUNITY

## 2023-07-18 NOTE — ASSESSMENT
[FreeTextEntry1] : Isaac remains on Lupron monotherapy.  He had [place] 8 disease with bone metastases.  His PSA has been undetectable since 2020 until recently where it increased to 0.03 and then 0.1 in April.  He has had no recent imaging.  His last DEXA scan was in 2022 which was normal.  He received his Lupron today.  He reports that he feels "fine".  No pains are noted.  Urinary flow is good.  Nocturia occurs 1 or 2 times.  There is no hematuria or dysuria.  He has no urgency or incontinence.  There is no edema.  He denies any headaches or dizziness.  There were no balance issues.  He has no respiratory complaints.  There is no chest pain or chest pressure.  There were no palpitations.  His appetite is good and his weight is stable.  He does not experience any hot flushes.\par \par On physical examination, he appears well.  His performance status is 0.  His blood pressure was 124/78 and his weight was 98.6 kg, essentially unchanged.  The HEENT examination was normal.  The chest is clear and the heart examination is normal.  There is no spinal column or chest wall tenderness to palpation or percussion.  The abdominal examination is normal.  There is no edema of the extremities.\par \par The PSA and chemistry panel are pending from today.  The white blood cell count was 5.3 with a hemoglobin value of 13.6 g and a platelet count of 172,000.\par \par Utilizing an , I explained to him that I would call his telephone with the results of his PSA.  If the PSA increases significantly, then this likely will order imaging scans.  He was concerned about the death of his grandfather, who apparently  quickly after a brain tumor was found and family members had urged him to check and make sure he has no brain tumors.  I explained to him that this is very rare and prostate cancer and it was not likely related to prostate cancer in his grandfather.  The decision will be whether to use standard imaging or [facility] pet imaging.  I would favor standard imaging at this time.  If the PSA has increased significantly, we will also likely perform a foundation CDX liquid biopsy given the fact that abiraterone with olaparib has been shown to be beneficial for patients with BRCA2 mutations, and this is now FDA approved as a combination.\par \par All questions were answered to the best my ability and to his apparent satisfaction.  I will see him once again in 3 months time.

## 2023-07-18 NOTE — HISTORY OF PRESENT ILLNESS
[de-identified] : Mr. Turk is seen in consultation on February 11, 2020. In 2017, he was found to have a markedly elevated PSA. PSA was 30.8. \par He had a history of prior negative prostate biopsy in (2015). He had an MRI performed which revealed clinically significant disease. 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 Tumor was grade group 5 involving 80% of one core. Another biopsy in the same zone was grade group for involving 25% of one core. The left apex showed adenocarcinoma of prostate, grade group 4 involving 95% and 30%, of 2 out of 2 cores. All other cores were negative. CT-guided core biopsy was performed of the right iliac bone, which did not reveal evidence of metastases. He underwent a radical prostatectomy in the latter part of 2017. Adenocarcinoma of the prostate, prognostic grade group for (Cushing 4+4 equals 8) with a tertiary pattern 5 was present. Disease was confined to the gland. One regional lymph node of 3 resected was positive for tumor, measuring 9 mm in size.\par His PSA was not undetectable in the postoperative setting, and he was referred for radiation therapy. This was administered from January 22 of 2018 until March 16, 2018. The total dose was 7000 cGy. he received a 6 month Lupron injection post op, (12/12/17) planned for 3 years of treatment, but insurance changes and apparently denied. \par On January 7, 2020, he had a PSA of 2.64. He was referred for a PET scan, and referred for medical oncology consultation.\par PSA values that are available revealed 24.42 on March 28, 2017. On June 19, 2017 it was 28.86 in August 15, 2017 it was 30.84. Postoperatively, the PSA was 1.69 on December 5, 2017 and 1.06 on September 9, 2019. It was 1.53 on October 1, 2019 and 2.64 on January 7, 2020.\par He had a PET CT done, revealed no evidence of mets.\par Feels well, no pains noted. Urine flow is good, has incontinence. No blood, no dysuria. Nocturia x 2. Wears a pad during the day. He has been recommended a Cunningham \par clamp. \par \par 5/19/20...Feels fine at this time, last seen 3 months ago. No pains. Urine flow is good, better sitting compared with standing. He has urgency, no incontinence. No blood, no dysuria. Nocturia 2-3 times, senses some frequency during the day. No back pains, no weakness. No edema of the legs. Appetite is normal, weight is stable. No fevers, nom chills, no recent infections. No fatigue. No cough, no GRAFF. \par \par 8/18/20...Feels well. No pains. No hot flushes. No fatigue. Urine flow is good. has incontinence with change in positions. wears a pad. No blood, no dysuria. Nocturia x 1-3 variable at times. he feels that if he eats protein for dinner he gets up more. No cough, No GRAFF, No edema. Appetite has been good, weight stable. No coronavirus infection. Lives with a roommate. Would like to have a COVID antibody test. \par \par 12/15/20...Due Eligard today. , live. He was seen by Dr Metz via video link. I believe this as for an artificial sphincter discussion, but he does not have good understanding. No pains. Appetite is good, no weight loss. No hot flushes. No fatigue. Urine stream is weak and urine sprays, has to sit to void. Nocturia  occasionally. Wears a pad. NO blood, no dysuria. No edema. No pains, no cough, no dyspnea. No fevers, no chills. Independent in ADls. 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 he had an artificial sphincter revision in January. Flow is better, with full control at this time. Nocturia is less, now at 2-3, prior 5. No dysuria. no blood. Appetite is good, no weight loss. Weight is up 3 pounds form last visit. No coronavirus vaccine as yet, unable to get an appt. No edema.no cough, no GRAFF. No fevers, no chills. No pains in the bones. No hot flushes. getting his Eligard today. No N/V/D/C. had colonoscopy and EGD, no issues. \par \par 6/9/21 - Eligard today. Feels well. seeing cardiologist next week for a check up, no issues. Has some knee arthritis, mostly when he is seated, not an issue with walking. Present gradually since 2013. NO meds used. Had CV vaccine. No hot flushes from the Eligard. No edema. no chest pains/palpitations. no chest pressure. Appetite is good, weight is stable. Urine flow is good, Has a new artificial sphincter, with no issues at all. No dysuria, no blood. No incontinence. Nocturia variable. \par \par 9/28/21 - went to derm as hr had some lesions removed. he then had cryo of the lesions./ Feels well overall. NO pains. Urine flow is improved when he sits, not strong when standing. Nocturia x 2, no incontinence. . No blood, no dysuria. No hot flushes, no significant fatigue. Appetite is good. No edema . No cough, no GRAFF. No chest pains/pressure.\par Derm note reviewed, they were SKs.  \par \par 2/2/22, rescheduled from 12/28/21...Using [place] video . "I'm fine". No pains. No fatigue. No hot flushes. Appetite is good, weight is stable. Urine flow is good, no incontinence unless with lifting. Nocturia x 1-2, improved. No blood, no dysuria. No edema. No chest pain/pressure. No cough, no GRAFF. No fevers, no chills. He did have a fever last month and cancelled his appt one month ago. Had his Covid booster shot. he was not tested, had rhinorrhea and fever with cough. No pains noted. \par \par 5/11/22...had shingles in April. Started on atenolol. rash resolved, has residual pain except for an episode of stabbing pain last week for one day. He feels fine. However, he does experience some pain at night in the area, a pinch like discomfort. C/W post herpetic neuralgia. Appetite is good, dropped a few pounds, trying to lose weight. No hot flushes noted. Urine flow is good, sits to void, no incontinence he says, then describes a Cunningham clamp. Nocturia 4-6 times, more since the pain in the right thigh area. feels somewhat different after the shingles, feels he has more pain when sitting and when more active. No cough, no GRAFF. No chest pain/pressure/palpitations. No edema. No pains in the bones. No N/V/D/C. \par \par 8/10/22...summary to date: John 9 disease documented in early 2017.  He had a prior negative biopsy.  His PSA was up to 30.  An MRI was performed.  He underwent a radical prostatectomy and this revealed John 8 (4+4) with tertiary pattern 5.  1 of 3 lymph nodes resected was positive for tumor with a metastatic deposit of 9 mm in size.  In the postoperative setting, his PSA was detectable and he was referred for radiation therapy.  He was initially started on Lupron in addition to radiation therapy with a plan for 3 years of treatment, but insurance denied that plan and he received at least 1 dose for 6 months.  In January 2020, he had a PSA of 2.64 and he had a PET scan performed.  He remains on Eligard and his PSA has been undetectable since December 15, 2020.  He has not had any recent imaging since the PET scan in January 2020.  2 areas of focal activity was seen, 1 in each inguinal region associated with normal-sized lymph nodes.  This was an [facility] PET/CT..  There was no clear evidence of metastatic disease.\par  utilized.  He reports that he feels well.  He has no pains.  He is due for Lupron today.  He does not have any hot flushes.  He denies any fatigue.  There were no headaches or dizziness.  His appetite is good and there is no weight loss.  He has no cough or shortness of breath.  There were no GI complaints.  Urinary flow is normal.  Nocturia occurs approximately once per night.  There is no incontinence or urgency.  There is no dysuria or hematuria.  He reports no edema.  There is no chest pain chest pressure or palpitations.\par \par 11/9/22 - continues on Lupron monotherapy.  not available for today's visit but pt able to read lips well. Pt states is feeling well. Offers no complaints. Nocturia 1-2. .  He denies any hot flushes.  He denies any fatigue.  There were no headaches or dizziness.  His appetite is good and there is no weight loss.  He has no cough or shortness of breath.  There were no GI complaints.  Urinary flow is normal.  Nocturia occurs approximately once per night.  There is no incontinence or urgency.  There is no dysuria or hematuria.  He reports no edema.  There is no chest pain chest pressure or palpitations. No new medications or changes. States he has started traveling and has been enjoying visiting family.\par \par 2/1/23 - continues on Lupron monotherapy. Cushing 8, bone mets. PSA undetectable since December 2020. No recent imaging, last DEXA August 2022, normal. Overall feeling "good". Notes intermittent dizziness and vertigo approx twice a year when he feels "everything is moving" and very off balance, he has to close his eyes and lie down, it resolves after a day or so. urine flow is normal, nocturia x 2. No incontinence, NO blood, no dysuria. No hot flushes, no fatigue. Appetite is good, no weight loss. No headaches. No paresthesias. No N/V/D./C. No edema. No chest pain/pressure/palpitations.  Has some acid reflux at times. NO fatigue\par \par 4/25/23 remains on Lupron monotherapy. John 8, bone mets. PSA undetectable since December 2020. No recent imaging, last DEXA  August 2022, normal.  Lupron today.  feels well, feet are much better, he was having pains i his ankles, resolved. saw a podiatrist, had some injections. No gout. pains resolved with the shots. Appetite is good, no weight loss. Hot flushes do not occur. No fatigue. No headaches, no dizziness. NO chest pain/pressure/palpitations.  No cough, No GRAFF. Urine flow is normal, nocturia x 1. No blood, no dysuria. NO incontinence, NO urgency. No N/V/D/C. No health issues since last visit. Has artificial sphincter seen in follow up by Dr Metz recently, scoped and all was well, note reviewed.  [de-identified] : John 4+4, tertiary pattern 5 at prostatectomy [de-identified] : 7/18/23 ..... on Lupron monotherapy. Crane 8, bone mets. PSA undetectable since December 2020 until recently. . No recent imaging, last DEXA  August 2022, normal.  Lupron today. Feels "fine", no pains noted. urine flow is good. Nocturia x 1-2. NO blood, no dysuria, no urgency, no incontinence. No edema. No headaches, no dizziness, no balance issues.  No cough, no GRAFF. No chest pain/pressure/palpitations. Appetite is good, weight stable. No hot flushes.

## 2023-07-18 NOTE — PHYSICAL EXAM
[Fully active, able to carry on all pre-disease performance without restriction] : Status 0 - Fully active, able to carry on all pre-disease performance without restriction [Normal] : affect appropriate [de-identified] : no edema [de-identified] : no gynecomastia

## 2023-07-18 NOTE — REVIEW OF SYSTEMS
[Negative] : Gastrointestinal [Fever] : no fever [Chills] : no chills [Fatigue] : no fatigue [Dysuria] : no dysuria [Incontinence] : no incontinence [Joint Pain] : no joint pain [Joint Stiffness] : no joint stiffness [Muscle Pain] : no muscle pain [Skin Rash] : no skin rash [Dizziness] : no dizziness [Anxiety] : no anxiety [Depression] : no depression [Hot Flashes] : no hot flashes [Muscle Weakness] : no muscle weakness [Easy Bruising] : no tendency for easy bruising [FreeTextEntry9] : no cramps [de-identified] : No HA, occ paresthesias

## 2023-07-18 NOTE — REASON FOR VISIT
[FreeTextEntry2] : prostate cancer [Interpreters_FullName] : Natacha [Interpreters_Relationshiptopatient] : [place] [employer], live

## 2023-07-19 ENCOUNTER — RESULT REVIEW (OUTPATIENT)
Age: 68
End: 2023-07-19

## 2023-07-19 LAB
ALBUMIN SERPL ELPH-MCNC: 4.3 G/DL
ALP BLD-CCNC: 74 U/L
ALT SERPL-CCNC: 22 U/L
ANION GAP SERPL CALC-SCNC: 12 MMOL/L
AST SERPL-CCNC: 27 U/L
BILIRUB SERPL-MCNC: 0.3 MG/DL
BUN SERPL-MCNC: 20 MG/DL
CALCIUM SERPL-MCNC: 9.9 MG/DL
CHLORIDE SERPL-SCNC: 103 MMOL/L
CO2 SERPL-SCNC: 25 MMOL/L
CREAT SERPL-MCNC: 1.24 MG/DL
EGFR: 63 ML/MIN/1.73M2
GLUCOSE SERPL-MCNC: 114 MG/DL
POTASSIUM SERPL-SCNC: 4.3 MMOL/L
PROT SERPL-MCNC: 6.9 G/DL
PSA SERPL-MCNC: 0.34 NG/ML
SODIUM SERPL-SCNC: 140 MMOL/L
TESTOST SERPL-MCNC: <2.5 NG/DL

## 2023-07-25 ENCOUNTER — APPOINTMENT (OUTPATIENT)
Dept: NUCLEAR MEDICINE | Facility: IMAGING CENTER | Age: 68
End: 2023-07-25

## 2023-07-25 ENCOUNTER — RESULT REVIEW (OUTPATIENT)
Age: 68
End: 2023-07-25

## 2023-07-25 ENCOUNTER — OUTPATIENT (OUTPATIENT)
Dept: OUTPATIENT SERVICES | Facility: HOSPITAL | Age: 68
LOS: 1 days | End: 2023-07-25
Payer: MEDICARE

## 2023-07-25 ENCOUNTER — APPOINTMENT (OUTPATIENT)
Dept: CT IMAGING | Facility: IMAGING CENTER | Age: 68
End: 2023-07-25

## 2023-07-25 DIAGNOSIS — K40.90 UNILATERAL INGUINAL HERNIA, WITHOUT OBSTRUCTION OR GANGRENE, NOT SPECIFIED AS RECURRENT: Chronic | ICD-10-CM

## 2023-07-25 DIAGNOSIS — C61 MALIGNANT NEOPLASM OF PROSTATE: ICD-10-CM

## 2023-07-25 PROCEDURE — 78830 RP LOCLZJ TUM SPECT W/CT 1: CPT | Mod: 26

## 2023-07-25 PROCEDURE — 74177 CT ABD & PELVIS W/CONTRAST: CPT

## 2023-07-25 PROCEDURE — 78306 BONE IMAGING WHOLE BODY: CPT

## 2023-07-25 PROCEDURE — 78306 BONE IMAGING WHOLE BODY: CPT | Mod: 26,MH

## 2023-07-25 PROCEDURE — A9561: CPT

## 2023-07-25 PROCEDURE — 74177 CT ABD & PELVIS W/CONTRAST: CPT | Mod: 26,MH

## 2023-07-25 PROCEDURE — 78830 RP LOCLZJ TUM SPECT W/CT 1: CPT

## 2023-07-31 ENCOUNTER — APPOINTMENT (OUTPATIENT)
Dept: INTERNAL MEDICINE | Facility: CLINIC | Age: 68
End: 2023-07-31
Payer: MEDICARE

## 2023-07-31 DIAGNOSIS — Z00.00 ENCOUNTER FOR GENERAL ADULT MEDICAL EXAMINATION W/OUT ABNORMAL FINDINGS: ICD-10-CM

## 2023-07-31 DIAGNOSIS — Z23 ENCOUNTER FOR IMMUNIZATION: ICD-10-CM

## 2023-07-31 LAB
BASOPHILS # BLD AUTO: 0.03 K/UL
BASOPHILS NFR BLD AUTO: 0.5 %
EOSINOPHIL # BLD AUTO: 0.11 K/UL
EOSINOPHIL NFR BLD AUTO: 1.9 %
HCT VFR BLD CALC: 44 %
HGB BLD-MCNC: 14.4 G/DL
IMM GRANULOCYTES NFR BLD AUTO: 0.3 %
LYMPHOCYTES # BLD AUTO: 1.81 K/UL
LYMPHOCYTES NFR BLD AUTO: 31.6 %
MAN DIFF?: NORMAL
MCHC RBC-ENTMCNC: 30.7 PG
MCHC RBC-ENTMCNC: 32.7 GM/DL
MCV RBC AUTO: 93.8 FL
MONOCYTES # BLD AUTO: 0.76 K/UL
MONOCYTES NFR BLD AUTO: 13.3 %
NEUTROPHILS # BLD AUTO: 2.99 K/UL
NEUTROPHILS NFR BLD AUTO: 52.4 %
PLATELET # BLD AUTO: 207 K/UL
RBC # BLD: 4.69 M/UL
RBC # FLD: 12.9 %
WBC # FLD AUTO: 5.72 K/UL

## 2023-07-31 PROCEDURE — G0438: CPT

## 2023-07-31 PROCEDURE — 99204 OFFICE O/P NEW MOD 45 MIN: CPT | Mod: 25

## 2023-07-31 PROCEDURE — 93000 ELECTROCARDIOGRAM COMPLETE: CPT

## 2023-07-31 PROCEDURE — 36415 COLL VENOUS BLD VENIPUNCTURE: CPT

## 2023-07-31 PROCEDURE — 99497 ADVNCD CARE PLAN 30 MIN: CPT

## 2023-07-31 RX ORDER — FAMOTIDINE 20 MG/1
20 TABLET, FILM COATED ORAL
Refills: 0 | Status: DISCONTINUED | COMMUNITY
End: 2023-07-31

## 2023-08-01 LAB
25(OH)D3 SERPL-MCNC: 38.1 NG/ML
ALBUMIN SERPL ELPH-MCNC: 4.4 G/DL
ALP BLD-CCNC: 73 U/L
ALT SERPL-CCNC: 23 U/L
ANION GAP SERPL CALC-SCNC: 12 MMOL/L
APPEARANCE: CLEAR
AST SERPL-CCNC: 26 U/L
BILIRUB SERPL-MCNC: 0.4 MG/DL
BILIRUBIN URINE: NEGATIVE
BLOOD URINE: NEGATIVE
BUN SERPL-MCNC: 21 MG/DL
CALCIUM SERPL-MCNC: 9.7 MG/DL
CHLORIDE SERPL-SCNC: 103 MMOL/L
CHOLEST SERPL-MCNC: 162 MG/DL
CO2 SERPL-SCNC: 26 MMOL/L
COLOR: NORMAL
CREAT SERPL-MCNC: 1.17 MG/DL
EGFR: 67 ML/MIN/1.73M2
ESTIMATED AVERAGE GLUCOSE: 137 MG/DL
FOLATE SERPL-MCNC: 15.7 NG/ML
GLUCOSE QUALITATIVE U: NEGATIVE MG/DL
GLUCOSE SERPL-MCNC: 87 MG/DL
HBA1C MFR BLD HPLC: 6.4 %
HDLC SERPL-MCNC: 39 MG/DL
KETONES URINE: NEGATIVE MG/DL
LDLC SERPL CALC-MCNC: 87 MG/DL
LEUKOCYTE ESTERASE URINE: NEGATIVE
MAGNESIUM SERPL-MCNC: 2.1 MG/DL
NITRITE URINE: NEGATIVE
NONHDLC SERPL-MCNC: 123 MG/DL
PH URINE: 6
POTASSIUM SERPL-SCNC: 4.7 MMOL/L
PROT SERPL-MCNC: 7.2 G/DL
PROTEIN URINE: NEGATIVE MG/DL
SODIUM SERPL-SCNC: 141 MMOL/L
SPECIFIC GRAVITY URINE: 1.01
T4 FREE SERPL-MCNC: 1.2 NG/DL
T4 SERPL-MCNC: 7.7 UG/DL
TRIGL SERPL-MCNC: 212 MG/DL
TSH SERPL-ACNC: 4.94 UIU/ML
UROBILINOGEN URINE: 0.2 MG/DL
VIT B12 SERPL-MCNC: 601 PG/ML

## 2023-08-06 NOTE — HEALTH RISK ASSESSMENT
[Time Spent: ___ minutes] : Time Spent: [unfilled] minutes [AdvancecareDate] : 08/23 [FreeTextEntry4] : Forms given to patient - he will return once completed. [Never] : Never

## 2023-08-06 NOTE — ASSESSMENT
[FreeTextEntry1] : Blood work was drawn and sent to the lab today. The patient has been instructed to call the office next week to discuss today's lab work.  16 minutes spent with patient discussing ACP/HCP. Forms given to patient - he will complete and return  to me.  Advised cardiology evaluation - names given to patient.  Consider colonoscopy  f/u with oncology as routine Urology per routine  continue current medications.  F/u 3 months

## 2023-08-06 NOTE — PHYSICAL EXAM
[No Acute Distress] : no acute distress [Well Nourished] : well nourished [Well Developed] : well developed [Well-Appearing] : well-appearing [Normal Sclera/Conjunctiva] : normal sclera/conjunctiva [PERRL] : pupils equal round and reactive to light [Normal Outer Ear/Nose] : the outer ears and nose were normal in appearance [EOMI] : extraocular movements intact [Normal Oropharynx] : the oropharynx was normal [No JVD] : no jugular venous distention [No Lymphadenopathy] : no lymphadenopathy [Supple] : supple [Thyroid Normal, No Nodules] : the thyroid was normal and there were no nodules present [No Respiratory Distress] : no respiratory distress  [No Accessory Muscle Use] : no accessory muscle use [Clear to Auscultation] : lungs were clear to auscultation bilaterally [Normal Rate] : normal rate  [Normal S1, S2] : normal S1 and S2 [Regular Rhythm] : with a regular rhythm [No Murmur] : no murmur heard [No Carotid Bruits] : no carotid bruits [No Abdominal Bruit] : a ~M bruit was not heard ~T in the abdomen [No Varicosities] : no varicosities [Pedal Pulses Present] : the pedal pulses are present [No Edema] : there was no peripheral edema [No Palpable Aorta] : no palpable aorta [No Extremity Clubbing/Cyanosis] : no extremity clubbing/cyanosis [Soft] : abdomen soft [Non-distended] : non-distended [Non Tender] : non-tender [No Masses] : no abdominal mass palpated [No HSM] : no HSM [Normal Bowel Sounds] : normal bowel sounds [Normal Posterior Cervical Nodes] : no posterior cervical lymphadenopathy [Normal Anterior Cervical Nodes] : no anterior cervical lymphadenopathy [No CVA Tenderness] : no CVA  tenderness [No Spinal Tenderness] : no spinal tenderness [No Joint Swelling] : no joint swelling [Grossly Normal Strength/Tone] : grossly normal strength/tone [Coordination Grossly Intact] : coordination grossly intact [No Rash] : no rash [No Focal Deficits] : no focal deficits [Normal Gait] : normal gait [Deep Tendon Reflexes (DTR)] : deep tendon reflexes were 2+ and symmetric [Normal Affect] : the affect was normal [Normal Insight/Judgement] : insight and judgment were intact

## 2023-08-06 NOTE — HISTORY OF PRESENT ILLNESS
[Other: _____] : [unfilled] [FreeTextEntry1] : MARY BROWN is a 68 year old male  presents for an annual physical. Patient is also here for f/u of chronic medical conditions, which have been stable on medications. These include HTN, hyperlipidemia, GERD. [de-identified] : Pt presents to establish care - he is with an assigned  due to his hearing impairment. He is without acute complaints. looking for new primary care physician.

## 2023-09-11 ENCOUNTER — APPOINTMENT (OUTPATIENT)
Dept: DERMATOLOGY | Facility: CLINIC | Age: 68
End: 2023-09-11
Payer: MEDICARE

## 2023-09-11 DIAGNOSIS — L21.9 SEBORRHEIC DERMATITIS, UNSPECIFIED: ICD-10-CM

## 2023-09-11 DIAGNOSIS — L82.1 OTHER SEBORRHEIC KERATOSIS: ICD-10-CM

## 2023-09-11 DIAGNOSIS — L57.0 ACTINIC KERATOSIS: ICD-10-CM

## 2023-09-11 PROCEDURE — 17003 DESTRUCT PREMALG LES 2-14: CPT

## 2023-09-11 PROCEDURE — 99214 OFFICE O/P EST MOD 30 MIN: CPT | Mod: 25

## 2023-09-11 PROCEDURE — 17000 DESTRUCT PREMALG LESION: CPT

## 2023-09-17 NOTE — DISCHARGE NOTE ADULT - REASON FOR ADMISSION
EMERGENCY DEPARTMENT ENCOUNTER      NAME: Jennifer Martin  AGE: 25 year old female  YOB: 1998  MRN: [id number]  EVALUATION DATE & TIME: 9/16/2023  7:03 PM    PCP: No Ref-Primary, Physician    ED PROVIDER: Jet Montaño PA-C      Chief Complaint   Patient presents with    Chest Pain         FINAL IMPRESSION:  1. Chest discomfort    2. Substernal chest pain    3. Globus sensation          ED COURSE & MEDICAL DECISION MAKING:    Pertinent Labs & Imaging studies reviewed. (See chart for details)  7:56 PM I met the patient and performed my initial interview and exam.   8:59 PM .We discussed the plan for discharge and the patient is agreeable. Reviewed supportive cares, symptomatic treatment, outpatient follow up, and reasons to return to the Emergency Department. Patient to be discharged by ED RN.     25 year old female presents to the Emergency Department for evaluation of chest pain.    ED Course as of 09/16/23 2318   Sat Sep 16, 2023   2020 Patient is a 25-year-old female, presents emergency department for evaluation of ongoing chest pain, and globus sensation.  She was reportedly seen on Thursday in [place] for this.  Chest x-ray, and was sent home with sulcrafate.  They reportedly initially tried prescribing the liquid of super fate, however this was too expensive, and they gave her the pill forms, which she reports are quite large pills.  She has been taking this, however she reports that the pills are making her symptoms worse.  Review of previous ED notes shows that her x-ray did not show any evidence of bones, or major obstructions.  She was instructed to follow-up with her GI doctor, as well as her primary care doctor.  She is here today for increasing chest pain.  On examination here, her lung sounds are clear bilaterally, she is not tachycardic tachypneic, certainly does not not seem cardiac in etiology.  She has no fever, cough, cold, chills.  Lung sounds are clear bilaterally.  She  does have reproducible discomfort to the anterior portion of her chest, substernal.  Differential this point includes possible perforation, worsening sensation of esophageal irritation.  Will obtain a chest x-ray here to ensure that there is no evidence of any free air within the chest, that would be worsening her symptoms.  I certainly do not think she has a pneumothorax at this point, however esophageal perforation would be the most concerning finding given ongoing chest pressure, and the sensation of globus.  She is able to swallow liquids without difficulty, is able to swallow foods, however somewhat difficult for her.  I encouraged her to follow-up with GI for possible endoscopy.   2044 I have independently reviewed the chest x-ray here in the emergency department.  I do not appreciate any perforations or free air.  Pending final radiology read.   2052 Chest XR,  PA & LAT  Xray of the chest here does not show any acute abnormalities.   2103 Patient seen and reevaluated here in the emergency department, no evidence of free air here.  We will have her follow-up with GI.  Discussed discontinuing the sucralfate if she is having difficulty swallowing it.  I think that is reasonable.  Discussed soft foods at home, no evidence of perforations or foreign bodies.  While the patient follow-up for an endoscopy.  Certainly no indication for emergent endoscopy at this point.  Patient will be discharged.   2317 Patient seen and reevaluated here, updated on imaging results.  No evidence of any free air within her chest, or pneumothorax.  Suspect symptoms are likely secondary to ongoing globus sensation.  I discussed with her that she can likely discontinue taking the pills if she does not feel like they are helping with her symptoms.  Encouraged her to follow-up with GI for outpatient endoscopy if her symptoms persist.  Discussed soft diet over the next couple of days, then slowly reintroducing more solid foods.  Discussed  return precautions, patient expressed understanding.  PERC negative here, no evidence of any ACS, pain reproducible, and x-ray here unremarkable.  Plan for discharge.     Medical Decision Making    History:  Supplemental history from: Documented in chart, if applicable  External Record(s) reviewed: Documented in chart, if applicable.    Work Up:  Chart documentation includes differential considered and any EKGs or imaging independently interpreted by provider, where specified.  In additional to work up documented, I considered the following work up: Documented in chart, if applicable.    External consultation:  Discussion of management with another provider: Documented in chart, if applicable    Complicating factors:  Care impacted by chronic illness: Chronic Pain  Care affected by social determinants of health: N/A    Disposition considerations: Discharge. No recommendations on prescription strength medication(s). See documentation for any additional details.      At the conclusion of the encounter I discussed the results of all of the tests and the disposition. The questions were answered. The patient or family acknowledged understanding and was agreeable with the care plan.       MEDICATIONS GIVEN IN THE EMERGENCY:  Medications - No data to display    NEW PRESCRIPTIONS STARTED AT TODAY'S ER VISIT  Discharge Medication List as of 9/16/2023  9:03 PM             =================================================================    HPI    Patient information was obtained from: Patient    Use of : N/A       Jennifer Martin is a 25 year old female with no pertinent history who presents to this ED for evaluation of chest pain.    Patient reports she had took a scufulate pill today (9/16) for globus sensation and this had made it worse.  Per nurse triage note she had been seen on Thursday 9/14/23 (~2 days ago). Starting this morning (9/16) the patient reports worsened mid chest pain that radiates out across  her chest bilaterally. She was supposed to get an oral suspension but had it switched to the pill as it was pricey. She is able to swallow liquids but has to swallow 4-5 times for solids to get through. Also endorsed nausea.     Denied any breathing problems, fever, cough, cold symptoms, or chills. No other complaints at this time.    REVIEW OF SYSTEMS   Review of Systems   Constitutional:  Negative for chills and fever.   HENT:  Positive for trouble swallowing (solids). Negative for congestion and rhinorrhea.         Positive for worsened globus sensation   Respiratory: Negative.  Negative for cough.    Cardiovascular:  Positive for chest pain (midchest, radiates throughout chest).   Gastrointestinal:  Positive for nausea.   Musculoskeletal:         Endorsed mild upper rib pain when palpated     All other systems reviewed and are negative.       PAST MEDICAL HISTORY:  Past Medical History:   Diagnosis Date    Asthma      PAST SURGICAL HISTORY:  No past surgical history on file.    CURRENT MEDICATIONS:    albuterol 90 MCG/ACT inhaler  flunisolide (NASALIDE) 0.025 % SOLN  fluticasone (FLONASE) 50 MCG/ACT nasal spray  NAPROXEN SODIUM PO  Norgestim-Eth Estrad Triphasic (ORTHO TRI-CYCLEN LO) 0.18/0.215/0.25 MG-25 MCG TABS       ALLERGIES:  No Known Allergies    FAMILY HISTORY:  No family history on file.    SOCIAL HISTORY:   Social History     Socioeconomic History    Marital status: Single   Tobacco Use    Smoking status: Former   Substance and Sexual Activity    Alcohol use: No    Drug use: No       VITALS:  /61   Pulse 55   Temp 98.1  F (36.7  C) (Oral)   Resp 16   Wt 81.6 kg (180 lb)   SpO2 98%   BMI 29.05 kg/m      PHYSICAL EXAM    Physical Exam  Vitals and nursing note reviewed.   Constitutional:       General: She is not in acute distress.     Appearance: Normal appearance. She is normal weight. She is not toxic-appearing or diaphoretic.   HENT:      Right Ear: External ear normal.      Left Ear:  External ear normal.   Eyes:      Conjunctiva/sclera: Conjunctivae normal.   Cardiovascular:      Rate and Rhythm: Normal rate and regular rhythm.      Pulses: Normal pulses.   Pulmonary:      Effort: Pulmonary effort is normal. No respiratory distress.      Breath sounds: No stridor. No wheezing or rales.   Abdominal:      General: Abdomen is flat. There is no distension.      Palpations: Abdomen is soft.      Tenderness: There is no abdominal tenderness. There is no right CVA tenderness, left CVA tenderness, guarding or rebound.   Musculoskeletal:         General: Tenderness present.      Cervical back: No tenderness.      Right lower leg: No edema.      Left lower leg: No edema.      Comments: Reproducible tenderness in the anterior chest wall, however no crepitus or deformity.  Neck is supple, trachea is midline.   Skin:     Findings: No erythema or rash.   Neurological:      Mental Status: She is alert. Mental status is at baseline.          LAB:  All pertinent labs reviewed and interpreted.  Labs Ordered and Resulted from Time of ED Arrival to Time of ED Departure - No data to display    RADIOLOGY:  Reviewed all pertinent imaging. Please see official radiology report.  Chest XR,  PA & LAT   Final Result   IMPRESSION: Negative chest. No significant change compared to 07/24/2016.          EKG:    Performed at: 1748    Impression: Sinus Rhythm    Rate: 76  Rhythm: Sinus Rhythm  Axis: 23 16 43  OK Interval: 132  QRS Interval: 78  QTc Interval: 402  ST Changes: No ST elevation or depression  Comparison: No previous for comparison    I have reviewed and interpreted the EKG(s) documented above along with Dr. Guadarrama, ED MD.    Roberto GIL , am serving as a scribe to document services personally performed by Jet Montaño PA-C, based on my observation and the provider's statements to me. Jet GIL PA-C, attest that Roberto Brooks  is acting in a scribe capacity, has observed my  performance of the services and has documented them in accordance with my direction.    Jet Montaño PA-C  Emergency Medicine  [facility] EMERGENCY ROOM  [address] [address]  [phone]  Dept: [phone]       Jet Montaño PA-C  09/16/23 4894     prost Ca

## 2023-09-20 ENCOUNTER — APPOINTMENT (OUTPATIENT)
Dept: DERMATOLOGY | Facility: CLINIC | Age: 68
End: 2023-09-20
Payer: MEDICARE

## 2023-09-20 DIAGNOSIS — D48.5 NEOPLASM OF UNCERTAIN BEHAVIOR OF SKIN: ICD-10-CM

## 2023-09-20 PROCEDURE — 12032 INTMD RPR S/A/T/EXT 2.6-7.5: CPT | Mod: 79

## 2023-09-20 PROCEDURE — 11403 EXC TR-EXT B9+MARG 2.1-3CM: CPT | Mod: 79

## 2023-10-02 LAB — DERMATOLOGY BIOPSY: NORMAL

## 2023-10-04 ENCOUNTER — APPOINTMENT (OUTPATIENT)
Dept: DERMATOLOGY | Facility: CLINIC | Age: 68
End: 2023-10-04
Payer: MEDICARE

## 2023-10-04 DIAGNOSIS — L82.1 OTHER SEBORRHEIC KERATOSIS: ICD-10-CM

## 2023-10-04 PROCEDURE — 99213 OFFICE O/P EST LOW 20 MIN: CPT

## 2023-10-09 ENCOUNTER — APPOINTMENT (OUTPATIENT)
Dept: DERMATOLOGY | Facility: CLINIC | Age: 68
End: 2023-10-09
Payer: MEDICARE

## 2023-10-09 DIAGNOSIS — R22.9 LOCALIZED SWELLING, MASS AND LUMP, UNSPECIFIED: ICD-10-CM

## 2023-10-09 DIAGNOSIS — L72.9 FOLLICULAR CYST OF THE SKIN AND SUBCUTANEOUS TISSUE, UNSPECIFIED: ICD-10-CM

## 2023-10-09 PROCEDURE — 11401 EXC TR-EXT B9+MARG 0.6-1 CM: CPT

## 2023-10-09 PROCEDURE — 12031 INTMD RPR S/A/T/EXT 2.5 CM/<: CPT

## 2023-10-13 ENCOUNTER — OUTPATIENT (OUTPATIENT)
Dept: OUTPATIENT SERVICES | Facility: HOSPITAL | Age: 68
LOS: 1 days | Discharge: HOME | End: 2023-10-13

## 2023-10-13 DIAGNOSIS — C61 MALIGNANT NEOPLASM OF PROSTATE: ICD-10-CM

## 2023-10-13 DIAGNOSIS — K40.90 UNILATERAL INGUINAL HERNIA, WITHOUT OBSTRUCTION OR GANGRENE, NOT SPECIFIED AS RECURRENT: Chronic | ICD-10-CM

## 2023-10-17 ENCOUNTER — APPOINTMENT (OUTPATIENT)
Dept: INFUSION THERAPY | Facility: HOSPITAL | Age: 68
End: 2023-10-17

## 2023-10-17 ENCOUNTER — RESULT REVIEW (OUTPATIENT)
Age: 68
End: 2023-10-17

## 2023-10-17 ENCOUNTER — APPOINTMENT (OUTPATIENT)
Dept: HEMATOLOGY ONCOLOGY | Facility: CLINIC | Age: 68
End: 2023-10-17
Payer: MEDICARE

## 2023-10-17 VITALS
RESPIRATION RATE: 16 BRPM | BODY MASS INDEX: 30.6 KG/M2 | TEMPERATURE: 98.1 F | WEIGHT: 220.02 LBS | SYSTOLIC BLOOD PRESSURE: 139 MMHG | DIASTOLIC BLOOD PRESSURE: 83 MMHG | OXYGEN SATURATION: 97 % | HEART RATE: 73 BPM

## 2023-10-17 LAB
BASOPHILS # BLD AUTO: 0.03 K/UL — SIGNIFICANT CHANGE UP (ref 0–0.2)
BASOPHILS # BLD AUTO: 0.03 K/UL — SIGNIFICANT CHANGE UP (ref 0–0.2)
BASOPHILS NFR BLD AUTO: 0.7 % — SIGNIFICANT CHANGE UP (ref 0–2)
BASOPHILS NFR BLD AUTO: 0.7 % — SIGNIFICANT CHANGE UP (ref 0–2)
EOSINOPHIL # BLD AUTO: 0.1 K/UL — SIGNIFICANT CHANGE UP (ref 0–0.5)
EOSINOPHIL # BLD AUTO: 0.1 K/UL — SIGNIFICANT CHANGE UP (ref 0–0.5)
EOSINOPHIL NFR BLD AUTO: 2.2 % — SIGNIFICANT CHANGE UP (ref 0–6)
EOSINOPHIL NFR BLD AUTO: 2.2 % — SIGNIFICANT CHANGE UP (ref 0–6)
HCT VFR BLD CALC: 42.1 % — SIGNIFICANT CHANGE UP (ref 39–50)
HCT VFR BLD CALC: 42.1 % — SIGNIFICANT CHANGE UP (ref 39–50)
HGB BLD-MCNC: 13.7 G/DL — SIGNIFICANT CHANGE UP (ref 13–17)
HGB BLD-MCNC: 13.7 G/DL — SIGNIFICANT CHANGE UP (ref 13–17)
IMM GRANULOCYTES NFR BLD AUTO: 0.2 % — SIGNIFICANT CHANGE UP (ref 0–0.9)
IMM GRANULOCYTES NFR BLD AUTO: 0.2 % — SIGNIFICANT CHANGE UP (ref 0–0.9)
LYMPHOCYTES # BLD AUTO: 1.56 K/UL — SIGNIFICANT CHANGE UP (ref 1–3.3)
LYMPHOCYTES # BLD AUTO: 1.56 K/UL — SIGNIFICANT CHANGE UP (ref 1–3.3)
LYMPHOCYTES # BLD AUTO: 34.2 % — SIGNIFICANT CHANGE UP (ref 13–44)
LYMPHOCYTES # BLD AUTO: 34.2 % — SIGNIFICANT CHANGE UP (ref 13–44)
MCHC RBC-ENTMCNC: 30.5 PG — SIGNIFICANT CHANGE UP (ref 27–34)
MCHC RBC-ENTMCNC: 30.5 PG — SIGNIFICANT CHANGE UP (ref 27–34)
MCHC RBC-ENTMCNC: 32.5 G/DL — SIGNIFICANT CHANGE UP (ref 32–36)
MCHC RBC-ENTMCNC: 32.5 G/DL — SIGNIFICANT CHANGE UP (ref 32–36)
MCV RBC AUTO: 93.8 FL — SIGNIFICANT CHANGE UP (ref 80–100)
MCV RBC AUTO: 93.8 FL — SIGNIFICANT CHANGE UP (ref 80–100)
MONOCYTES # BLD AUTO: 0.55 K/UL — SIGNIFICANT CHANGE UP (ref 0–0.9)
MONOCYTES # BLD AUTO: 0.55 K/UL — SIGNIFICANT CHANGE UP (ref 0–0.9)
MONOCYTES NFR BLD AUTO: 12.1 % — SIGNIFICANT CHANGE UP (ref 2–14)
MONOCYTES NFR BLD AUTO: 12.1 % — SIGNIFICANT CHANGE UP (ref 2–14)
NEUTROPHILS # BLD AUTO: 2.31 K/UL — SIGNIFICANT CHANGE UP (ref 1.8–7.4)
NEUTROPHILS # BLD AUTO: 2.31 K/UL — SIGNIFICANT CHANGE UP (ref 1.8–7.4)
NEUTROPHILS NFR BLD AUTO: 50.6 % — SIGNIFICANT CHANGE UP (ref 43–77)
NEUTROPHILS NFR BLD AUTO: 50.6 % — SIGNIFICANT CHANGE UP (ref 43–77)
NRBC # BLD: 0 /100 WBCS — SIGNIFICANT CHANGE UP (ref 0–0)
NRBC # BLD: 0 /100 WBCS — SIGNIFICANT CHANGE UP (ref 0–0)
PLATELET # BLD AUTO: 190 K/UL — SIGNIFICANT CHANGE UP (ref 150–400)
PLATELET # BLD AUTO: 190 K/UL — SIGNIFICANT CHANGE UP (ref 150–400)
RBC # BLD: 4.49 M/UL — SIGNIFICANT CHANGE UP (ref 4.2–5.8)
RBC # BLD: 4.49 M/UL — SIGNIFICANT CHANGE UP (ref 4.2–5.8)
RBC # FLD: 12.5 % — SIGNIFICANT CHANGE UP (ref 10.3–14.5)
RBC # FLD: 12.5 % — SIGNIFICANT CHANGE UP (ref 10.3–14.5)
WBC # BLD: 4.56 K/UL — SIGNIFICANT CHANGE UP (ref 3.8–10.5)
WBC # BLD: 4.56 K/UL — SIGNIFICANT CHANGE UP (ref 3.8–10.5)
WBC # FLD AUTO: 4.56 K/UL — SIGNIFICANT CHANGE UP (ref 3.8–10.5)
WBC # FLD AUTO: 4.56 K/UL — SIGNIFICANT CHANGE UP (ref 3.8–10.5)

## 2023-10-17 PROCEDURE — 99214 OFFICE O/P EST MOD 30 MIN: CPT

## 2023-10-18 LAB
ALBUMIN SERPL ELPH-MCNC: 4.2 G/DL
ALP BLD-CCNC: 86 U/L
ALT SERPL-CCNC: 17 U/L
ANION GAP SERPL CALC-SCNC: 11 MMOL/L
AST SERPL-CCNC: 21 U/L
BILIRUB SERPL-MCNC: 0.2 MG/DL
BUN SERPL-MCNC: 19 MG/DL
CALCIUM SERPL-MCNC: 9.3 MG/DL
CHLORIDE SERPL-SCNC: 106 MMOL/L
CO2 SERPL-SCNC: 27 MMOL/L
CREAT SERPL-MCNC: 1.15 MG/DL
EGFR: 69 ML/MIN/1.73M2
GLUCOSE SERPL-MCNC: 121 MG/DL
POTASSIUM SERPL-SCNC: 4.4 MMOL/L
PROT SERPL-MCNC: 6.8 G/DL
PSA SERPL-MCNC: 1.22 NG/ML
SODIUM SERPL-SCNC: 143 MMOL/L

## 2023-10-19 ENCOUNTER — APPOINTMENT (OUTPATIENT)
Dept: INTERNAL MEDICINE | Facility: CLINIC | Age: 68
End: 2023-10-19

## 2023-10-20 ENCOUNTER — NON-APPOINTMENT (OUTPATIENT)
Age: 68
End: 2023-10-20

## 2023-10-20 LAB — DERMATOLOGY BIOPSY: NORMAL

## 2023-11-03 ENCOUNTER — OUTPATIENT (OUTPATIENT)
Dept: OUTPATIENT SERVICES | Facility: HOSPITAL | Age: 68
LOS: 1 days | End: 2023-11-03
Payer: MEDICARE

## 2023-11-03 ENCOUNTER — APPOINTMENT (OUTPATIENT)
Dept: NUCLEAR MEDICINE | Facility: IMAGING CENTER | Age: 68
End: 2023-11-03
Payer: MEDICARE

## 2023-11-03 DIAGNOSIS — C61 MALIGNANT NEOPLASM OF PROSTATE: ICD-10-CM

## 2023-11-03 DIAGNOSIS — K40.90 UNILATERAL INGUINAL HERNIA, WITHOUT OBSTRUCTION OR GANGRENE, NOT SPECIFIED AS RECURRENT: Chronic | ICD-10-CM

## 2023-11-03 PROCEDURE — 78816 PET IMAGE W/CT FULL BODY: CPT | Mod: 26,MH

## 2023-11-03 PROCEDURE — A9595: CPT

## 2023-11-03 PROCEDURE — 78816 PET IMAGE W/CT FULL BODY: CPT

## 2023-12-11 ENCOUNTER — APPOINTMENT (OUTPATIENT)
Dept: GASTROENTEROLOGY | Facility: CLINIC | Age: 68
End: 2023-12-11

## 2024-01-16 ENCOUNTER — OUTPATIENT (OUTPATIENT)
Dept: OUTPATIENT SERVICES | Facility: HOSPITAL | Age: 69
LOS: 1 days | Discharge: HOME | End: 2024-01-16

## 2024-01-16 DIAGNOSIS — C61 MALIGNANT NEOPLASM OF PROSTATE: ICD-10-CM

## 2024-01-16 DIAGNOSIS — K40.90 UNILATERAL INGUINAL HERNIA, WITHOUT OBSTRUCTION OR GANGRENE, NOT SPECIFIED AS RECURRENT: Chronic | ICD-10-CM

## 2024-01-22 ENCOUNTER — RESULT REVIEW (OUTPATIENT)
Age: 69
End: 2024-01-22

## 2024-01-22 ENCOUNTER — APPOINTMENT (OUTPATIENT)
Dept: HEMATOLOGY ONCOLOGY | Facility: CLINIC | Age: 69
End: 2024-01-22
Payer: MEDICARE

## 2024-01-22 ENCOUNTER — APPOINTMENT (OUTPATIENT)
Dept: INFUSION THERAPY | Facility: HOSPITAL | Age: 69
End: 2024-01-22

## 2024-01-22 VITALS
BODY MASS INDEX: 30.86 KG/M2 | TEMPERATURE: 97.2 F | HEART RATE: 72 BPM | HEIGHT: 71.06 IN | WEIGHT: 220.46 LBS | RESPIRATION RATE: 16 BRPM | OXYGEN SATURATION: 95 % | SYSTOLIC BLOOD PRESSURE: 165 MMHG | DIASTOLIC BLOOD PRESSURE: 97 MMHG

## 2024-01-22 VITALS — SYSTOLIC BLOOD PRESSURE: 146 MMHG | DIASTOLIC BLOOD PRESSURE: 90 MMHG

## 2024-01-22 LAB
BASOPHILS # BLD AUTO: 0.03 K/UL — SIGNIFICANT CHANGE UP (ref 0–0.2)
BASOPHILS NFR BLD AUTO: 0.7 % — SIGNIFICANT CHANGE UP (ref 0–2)
EOSINOPHIL # BLD AUTO: 0.1 K/UL — SIGNIFICANT CHANGE UP (ref 0–0.5)
EOSINOPHIL NFR BLD AUTO: 2.4 % — SIGNIFICANT CHANGE UP (ref 0–6)
HCT VFR BLD CALC: 42.7 % — SIGNIFICANT CHANGE UP (ref 39–50)
HGB BLD-MCNC: 14.2 G/DL — SIGNIFICANT CHANGE UP (ref 13–17)
IMM GRANULOCYTES NFR BLD AUTO: 0.2 % — SIGNIFICANT CHANGE UP (ref 0–0.9)
LYMPHOCYTES # BLD AUTO: 1.37 K/UL — SIGNIFICANT CHANGE UP (ref 1–3.3)
LYMPHOCYTES # BLD AUTO: 32.2 % — SIGNIFICANT CHANGE UP (ref 13–44)
MCHC RBC-ENTMCNC: 31.1 PG — SIGNIFICANT CHANGE UP (ref 27–34)
MCHC RBC-ENTMCNC: 33.3 G/DL — SIGNIFICANT CHANGE UP (ref 32–36)
MCV RBC AUTO: 93.4 FL — SIGNIFICANT CHANGE UP (ref 80–100)
MONOCYTES # BLD AUTO: 0.6 K/UL — SIGNIFICANT CHANGE UP (ref 0–0.9)
MONOCYTES NFR BLD AUTO: 14.1 % — HIGH (ref 2–14)
NEUTROPHILS # BLD AUTO: 2.14 K/UL — SIGNIFICANT CHANGE UP (ref 1.8–7.4)
NEUTROPHILS NFR BLD AUTO: 50.4 % — SIGNIFICANT CHANGE UP (ref 43–77)
NRBC # BLD: 0 /100 WBCS — SIGNIFICANT CHANGE UP (ref 0–0)
PLATELET # BLD AUTO: 177 K/UL — SIGNIFICANT CHANGE UP (ref 150–400)
RBC # BLD: 4.57 M/UL — SIGNIFICANT CHANGE UP (ref 4.2–5.8)
RBC # FLD: 12.2 % — SIGNIFICANT CHANGE UP (ref 10.3–14.5)
WBC # BLD: 4.25 K/UL — SIGNIFICANT CHANGE UP (ref 3.8–10.5)
WBC # FLD AUTO: 4.25 K/UL — SIGNIFICANT CHANGE UP (ref 3.8–10.5)

## 2024-01-22 PROCEDURE — 99213 OFFICE O/P EST LOW 20 MIN: CPT

## 2024-01-23 LAB
ALBUMIN SERPL ELPH-MCNC: 4.2 G/DL
ALP BLD-CCNC: 76 U/L
ALT SERPL-CCNC: 23 U/L
ANION GAP SERPL CALC-SCNC: 12 MMOL/L
AST SERPL-CCNC: 24 U/L
BILIRUB SERPL-MCNC: 0.3 MG/DL
BUN SERPL-MCNC: 18 MG/DL
CALCIUM SERPL-MCNC: 9.6 MG/DL
CHLORIDE SERPL-SCNC: 106 MMOL/L
CO2 SERPL-SCNC: 26 MMOL/L
CREAT SERPL-MCNC: 1.17 MG/DL
EGFR: 67 ML/MIN/1.73M2
GLUCOSE SERPL-MCNC: 104 MG/DL
POTASSIUM SERPL-SCNC: 4.5 MMOL/L
PROT SERPL-MCNC: 7.2 G/DL
PSA SERPL-MCNC: 2.24 NG/ML
SODIUM SERPL-SCNC: 143 MMOL/L

## 2024-02-01 NOTE — REVIEW OF SYSTEMS
[Loss of Hearing] : loss of hearing [Hot Flashes] : hot flashes [Negative] : Musculoskeletal [Fever] : no fever [Chills] : no chills [Recent Change In Weight] : ~T no recent weight change [Chest Pain] : no chest pain [Palpitations] : no palpitations [Lower Ext Edema] : no lower extremity edema [Cough] : no cough [SOB on Exertion] : no shortness of breath during exertion [Skin Rash] : no skin rash [Skin Wound] : no skin wound [Dizziness] : no dizziness [Anxiety] : no anxiety [Depression] : no depression [Muscle Weakness] : no muscle weakness [Easy Bleeding] : no tendency for easy bleeding [Easy Bruising] : no tendency for easy bruising [de-identified] : No HA, no paresthesias

## 2024-02-01 NOTE — HISTORY OF PRESENT ILLNESS
[Disease: _____________________] : Disease: [unfilled] [T: ___] : T[unfilled] [N: ___] : N[unfilled] [M: ___] : M[unfilled] [AJCC Stage: ____] : AJCC Stage: [unfilled] [de-identified] : Mr. Turk is seen in consultation on February 11, 2020. In 2017, he was found to have a markedly elevated PSA. PSA was 30.8.  He had a history of prior negative prostate biopsy in (2015). He had an MRI performed which revealed clinically significant disease. He underwent a targeted biopsy which revealed Ellsworth 9 in the left mid zone. Tumor was grade group 5 involving 80% of one core. Another biopsy in the same zone was grade group for involving 25% of one core. The left apex showed adenocarcinoma of prostate, grade group 4 involving 95% and 30%, of 2 out of 2 cores. All other cores were negative. CT-guided core biopsy was performed of the right iliac bone, which did not reveal evidence of metastases. He underwent a radical prostatectomy in the latter part of 2017. Adenocarcinoma of the prostate, prognostic grade group for (John 4+4 equals 8) with a tertiary pattern 5 was present. Disease was confined to the gland. One regional lymph node of 3 resected was positive for tumor, measuring 9 mm in size. His PSA was not undetectable in the postoperative setting, and he was referred for radiation therapy. This was administered from January 22 of 2018 until March 16, 2018. The total dose was 7000 cGy. he received a 6 month Lupron injection post op, (12/12/17) planned for 3 years of treatment, but insurance changes and apparently denied.  On January 7, 2020, he had a PSA of 2.64. He was referred for a PET scan, and referred for medical oncology consultation. PSA values that are available revealed 24.42 on March 28, 2017. On June 19, 2017 it was 28.86 in August 15, 2017 it was 30.84. Postoperatively, the PSA was 1.69 on December 5, 2017 and 1.06 on September 9, 2019. It was 1.53 on October 1, 2019 and 2.64 on January 7, 2020. He had a PET CT done, revealed no evidence of mets. Feels well, no pains noted. Urine flow is good, has incontinence. No blood, no dysuria. Nocturia x 2. Wears a pad during the day. He has been recommended a Cunningham  clamp.   5/19/20...Feels fine at this time, last seen 3 months ago. No pains. Urine flow is good, better sitting compared with standing. He has urgency, no incontinence. No blood, no dysuria. Nocturia 2-3 times, senses some frequency during the day. No back pains, no weakness. No edema of the legs. Appetite is normal, weight is stable. No fevers, nom chills, no recent infections. No fatigue. No cough, no GRAFF.   8/18/20...Feels well. No pains. No hot flushes. No fatigue. Urine flow is good. has incontinence with change in positions. wears a pad. No blood, no dysuria. Nocturia x 1-3 variable at times. he feels that if he eats protein for dinner he gets up more. No cough, No GRAFF, No edema. Appetite has been good, weight stable. No coronavirus infection. Lives with a roommate. Would like to have a COVID antibody test.   12/15/20...Due Shayla today. , live. He was seen by Dr Metz via video link. I believe this as for an artificial sphincter discussion, but he does not have good understanding. No pains. Appetite is good, no weight loss. No hot flushes. No fatigue. Urine stream is weak and urine sprays, has to sit to void. Nocturia  occasionally. Wears a pad. NO blood, no dysuria. No edema. No pains, no cough, no dyspnea. No fevers, no chills. Independent in ADls.   3/10/21 - radical prostatectomy followed by RT for Ellsworth 9 prostate cancer, 2017. he had an artificial sphincter revision in January. Flow is better, with full control at this time. Nocturia is less, now at 2-3, prior 5. No dysuria. no blood. Appetite is good, no weight loss. Weight is up 3 pounds form last visit. No coronavirus vaccine as yet, unable to get an appt. No edema.no cough, no GRAFF. No fevers, no chills. No pains in the bones. No hot flushes. getting his Eligard today. No N/V/D/C. had colonoscopy and EGD, no issues.   6/9/21 - Eligard today. Feels well. seeing cardiologist next week for a check up, no issues. Has some knee arthritis, mostly when he is seated, not an issue with walking. Present gradually since 2013. NO meds used. Had CV vaccine. No hot flushes from the Eligard. No edema. no chest pains/palpitations. no chest pressure. Appetite is good, weight is stable. Urine flow is good, Has a new artificial sphincter, with no issues at all. No dysuria, no blood. No incontinence. Nocturia variable.   9/28/21 - went to derm as hr had some lesions removed. he then had cryo of the lesions./ Feels well overall. NO pains. Urine flow is improved when he sits, not strong when standing. Nocturia x 2, no incontinence. . No blood, no dysuria. No hot flushes, no significant fatigue. Appetite is good. No edema . No cough, no GRAFF. No chest pains/pressure. Derm note reviewed, they were SKs.    2/2/22, rescheduled from 12/28/21...Using [facility] video . "I'm fine". No pains. No fatigue. No hot flushes. Appetite is good, weight is stable. Urine flow is good, no incontinence unless with lifting. Nocturia x 1-2, improved. No blood, no dysuria. No edema. No chest pain/pressure. No cough, no GRAFF. No fevers, no chills. He did have a fever last month and cancelled his appt one month ago. Had his Covid booster shot. he was not tested, had rhinorrhea and fever with cough. No pains noted.   5/11/22...had shingles in April. Started on atenolol. rash resolved, has residual pain except for an episode of stabbing pain last week for one day. He feels fine. However, he does experience some pain at night in the area, a pinch like discomfort. C/W post herpetic neuralgia. Appetite is good, dropped a few pounds, trying to lose weight. No hot flushes noted. Urine flow is good, sits to void, no incontinence he says, then describes a Cunningham clamp. Nocturia 4-6 times, more since the pain in the right thigh area. feels somewhat different after the shingles, feels he has more pain when sitting and when more active. No cough, no GRAFF. No chest pain/pressure/palpitations. No edema. No pains in the bones. No N/V/D/C.   8/10/22...summary to date: John 9 disease documented in early 2017.  He had a prior negative biopsy.  His PSA was up to 30.  An MRI was performed.  He underwent a radical prostatectomy and this revealed Ellsworth 8 (4+4) with tertiary pattern 5.  1 of 3 lymph nodes resected was positive for tumor with a metastatic deposit of 9 mm in size.  In the postoperative setting, his PSA was detectable and he was referred for radiation therapy.  He was initially started on Lupron in addition to radiation therapy with a plan for 3 years of treatment, but insurance denied that plan and he received at least 1 dose for 6 months.  In January 2020, he had a PSA of 2.64 and he had a PET scan performed.  He remains on Eligard and his PSA has been undetectable since December 15, 2020.  He has not had any recent imaging since the PET scan in January 2020.  2 areas of focal activity was seen, 1 in each inguinal region associated with normal-sized lymph nodes.  This was an Ax[facility] PET/CT..  There was no clear evidence of metastatic disease.  utilized.  He reports that he feels well.  He has no pains.  He is due for Lupron today.  He does not have any hot flushes.  He denies any fatigue.  There were no headaches or dizziness.  His appetite is good and there is no weight loss.  He has no cough or shortness of breath.  There were no GI complaints.  Urinary flow is normal.  Nocturia occurs approximately once per night.  There is no incontinence or urgency.  There is no dysuria or hematuria.  He reports no edema.  There is no chest pain chest pressure or palpitations.  11/9/22 - continues on Lupron monotherapy.  not available for today's visit but pt able to read lips well. Pt states is feeling well. Offers no complaints. Nocturia 1-2. .  He denies any hot flushes.  He denies any fatigue.  There were no headaches or dizziness.  His appetite is good and there is no weight loss.  He has no cough or shortness of breath.  There were no GI complaints.  Urinary flow is normal.  Nocturia occurs approximately once per night.  There is no incontinence or urgency.  There is no dysuria or hematuria.  He reports no edema.  There is no chest pain chest pressure or palpitations. No new medications or changes. States he has started traveling and has been enjoying visiting family.  2/1/23 - continues on Lupron monotherapy. Ellsworth 8, bone mets. PSA undetectable since December 2020. No recent imaging, last DEXA August 2022, normal. Overall feeling "good". Notes intermittent dizziness and vertigo approx twice a year when he feels "everything is moving" and very off balance, he has to close his eyes and lie down, it resolves after a day or so. urine flow is normal, nocturia x 2. No incontinence, NO blood, no dysuria. No hot flushes, no fatigue. Appetite is good, no weight loss. No headaches. No paresthesias. No N/V/D./C. No edema. No chest pain/pressure/palpitations.  Has some acid reflux at times. NO fatigue  4/25/23 remains on Lupron monotherapy. Ellsworth 8, bone mets. PSA undetectable since December 2020. No recent imaging, last DEXA  August 2022, normal.  Lupron today.  feels well, feet are much better, he was having pains i his ankles, resolved. saw a podiatrist, had some injections. No gout. pains resolved with the shots. Appetite is good, no weight loss. Hot flushes do not occur. No fatigue. No headaches, no dizziness. NO chest pain/pressure/palpitations.  No cough, No GRAFF. Urine flow is normal, nocturia x 1. No blood, no dysuria. NO incontinence, NO urgency. No N/V/D/C. No health issues since last visit. Has artificial sphincter seen in follow up by Dr Metz recently, scoped and all was well, note reviewed.   7/18/23 ..... on Lupron monotherapy. John 8, bone mets. PSA undetectable since December 2020 until recently. . No recent imaging, last DEXA  August 2022, normal.  Lupron today. Feels "fine", no pains noted. urine flow is good. Nocturia x 1-2. NO blood, no dysuria, no urgency, no incontinence. No edema. No headaches, no dizziness, no balance issues.  No cough, no GRAFF. No chest pain/pressure/palpitations. Appetite is good, weight stable. No hot flushes.   10/17/23 - continues on Lupron monotherapy. John 8, bone mets. Overall feeling well. 3 weeks ago he had a cyst removed from his back and last week he had another cyst removed. Appetite is good. Urine flow is "normal", nocturia 0-2x/night. Denies fever, chills, night sweats, hot flashes, headache, dizziness, balance issues, chest pain, palpitations, SOB, cough, nausea/vomiting, diarrhea/constipation, abdominal pain, dysuria, hematuria, urgency, incontinence, LE edema, bleeding, muscle or joint pain/weakness. [de-identified] : John 4+4, tertiary pattern 5 at prostatectomy [de-identified] : [facility] liquid biopsy performed January 2024 revealed BRCA2 [id number] [de-identified] : 1/22/24... continues on Lupron monotherapy. John 8, bone mets. Feels well, no pains. Appetite is good, no weight loss. No cough, no GRAFF. No chest pain/pressure/palpitatiosn. No edema. NO HA, no dizziness. Urine flow is normal, nocturia x 0-2. No urgency, no incontinence. No blood, no dysuria. No fatigue. Occ hot flushes, mostly in the AM.

## 2024-02-01 NOTE — ASSESSMENT
[Palliative Care Plan] : not applicable at this time [FreeTextEntry1] : Isaac continues on Lupron monotherapy at this time.  He is seen with the aid of an .  He had a [place] 8 tumor with bone mets in the past.  He feels well and he reports no pains.  His appetite is good and there is no weight loss.  Denies any cough or shortness of breath.  There is no chest pain chest pressure or palpitation complaint.  There is no edema in extremities.  He denies any headaches or dizziness.  Urinary flow is normal.  Nocturia occurs 0-2 times.  There is no urgency or incontinence.  There is no hematuria or dysuria.  He denies any fatigue.  He has occasional hot flushes and when they occur they occur mostly in the morning.  On physical examination, he appears in no acute distress.  His blood pressure was 165/97 when he arrived.  It was 146/90 at the end of the visit.  He does take his blood pressure medication.  His weight was 100 kg.  The oxygen saturation was 95%.  The HEENT examination was normal.  There is no palpable adenopathy present.  The lungs were clear and the heart examination was normal.  The abdominal examination was normal.  There is no spinal column or chest wall tenderness to palpation or percussion.  Laboratory values from today revealed a normal CBC.  The white blood cell count was 4.2 with a hemoglobin value of 14.2 g.  The platelet count was 1 77,000.  The chemistry panel and PSA are pending at this time.  His most recent PSA had increased to 1.21 in October.  It was 0.34 in July.  He had a CT scan and nuclear bone marrow imaging performed in July.  There was 1 spot in the bone in the right iliac bone.  He had a PSMA PET scan performed on October 17 and this revealed 1 small lymph node at the diaphragmatic crux on the right side, measuring 0.9 mm.  The bone lesion was not particularly avid, and the radiologist commented that it was stable since 2017.  I personally reviewed the images of the PET/CT and I demonstrated him the areas of concern.  He has very low-volume disease.  Will see what his PSA is today.  We talked about the possibility of starting him on abiraterone and prednisone in the event his PSA continues to rise.  I explained to him what the size of this lymph node represented.  I used a tape measure to show him.  This would be a normal lymph node based on standard imaging.  He does need a repeat CT scan of the chest because he had a new nodule measuring 0.5 cm on the most recent CAT scan.  On the CT portion of the PET, there was some likely infectious abnormalities.  A dedicated CT scan will be performed to assess the lung.  The lung findings were nonavid.  All questions were answered to the best my ability and to his apparent satisfaction.  The number listed as his phone number is incorrect.  The number in the FYI section is his proper number for contact.  He will be seen once again in 3 months time, for his next Lupron dose.  If there is a change in therapy he will need to be seen earlier.

## 2024-02-01 NOTE — RESULTS/DATA
[FreeTextEntry1] : EXAM: [id number] - PETCT WB PSMA SUBS  - ORDERED BY: OSVALDO ALFARO PROCEDURE DATE:  11/03/2023 INTERPRETATION:  CLINICAL INFORMATION: Metastatic prostate cancer on ADT along with rising PSA (1.22 ng/mL). TREATMENT STRATEGY EVALUATION: Subsequent RADIOPHARMACEUTICAL:  9.51 mCi F-18, piflufolastat (Pylarify), I.V. UPTAKE PERIOD: 70 minutes SCANNER: [employer] [id number]  TECHNIQUE:  Following intravenous injection of radiopharmaceutical and above uptake period, PET/CT was obtained from vertex of skull to below the knees. CT was performed during shallow respiration. CT protocol was optimized for PET attenuation correction and anatomic localization and was not designed to produce and cannot replace state-of-the-art diagnostic CT images with specific imaging protocols for different body parts and indications. Images were reconstructed and reviewed in axial, coronal, and sagittal views and three-dimensional MIP.  The standardized uptake values (SUV) are normalized to patient body weight and indicate the highest activity concentration (SUVmax) in a given site. All image numbers refer to axial image number.  PET findings are scored using the Molecular Imaging PSMA Expression Score (Score):  Score 0: Uptake < blood pool - No PSMA expression Score 1: Uptake > blood pool AND < liver - Low PSMA expression Score 2: Uptake > liver AND < parotid gland - Intermediate expression Score 3: Uptake > parotid gland - High expression  (Joe, et. al. Prostate Cancer Molecular Imaging Standardized Evaluation (PROMISE): Proposed miTNM Classification for the Interpretation of PSMA-Ligand PET/CT. J Nucl Med 2018; 59:469-478).  COMPARISON:  No prior PSMA-PET/CT. Axumin-PET/CT dated 1/31/2020, was reviewed.  OTHER STUDIES USED FOR CORRELATION: CT abdomen and pelvis and bone scan dated 10/4/2017 and 7/25/2023  FINDINGS:  HEAD/NECK: Physiologic radiotracer activity in lacrimal and salivary glands.  THORAX: Physiologic radiotracer activity. No enlarged or avid lymph node.  LUNGS: No abnormal radiotracer activity. Branching nodular opacities in right middle lobe, slightly increased since 1/31/2020 (image 138). Small, nonavid left lower lobe opacity, new since 1/31/2020, and similar in appearance as compared to 7/25/2023, allowing for differences in CT technique (image 150).  PLEURA/PERICARDIUM: No abnormal radiotracer activity. No pleural or pericardial effusion.  HEPATOBILIARY/PANCREAS: Physiologic radiotracer activity. For reference, normal liver demonstrates SUV mean 9.0.  SPLEEN: Physiologic radiotracer activity. Normal size.  ADRENAL GLANDS: No abnormal radiotracer activity. No nodule.  KIDNEYS/URINARY BLADDER: Physiologic excreted radiotracer activity.  REPRODUCTIVE ORGANS: No abnormal radiotracer activity. Prostatectomy. Artificial urinary sphincter with reservoir in the right lower quadrant. Physiologic urinary activity in base of penis, just proximal to the artificial sphincter.  ABDOMINOPELVIC LYMPH NODES/RETROPERITONEUM: Small right retrocrural lymph node with increased radiotracer activity (score 2) measures 0.9 x 0.9 cm, SUV 11.3 (image 161). This node measures 0.9 x 0.5 cm on 1/31/2020, and in retrospect demonstrates minimal avidity (SUV 3.6).  BOWEL/PERITONEUM/MESENTERY: A minimally avid 1 cm sclerotic density in the posterior right iliac bone is unchanged on CT since 1/31/2020  VESSELS: Coronary artery calcifications. Few atherosclerotic calcifications.  BONES/SOFT TISSUES: A minimally avid 1 cm sclerotic density in the posterior right iliac bone is unchanged on CT since 2017 (SUV 2.0; image 224).  IMPRESSION: Abnormal whole-body PSMA-PET/CT scan.  1. No scan evidence of recurrent disease in prostate bed.  2. Small PSMA-positive right retrocrural lymph node is compatible with metastasis. The intensity of radiotracer activity suggests intermediate PSMA expression.  3. Minimally avid 1 cm sclerotic density, posterior right iliac bone, unchanged on CT since 2017, probably is benign.  4. Branching nodular opacities and right middle lobe, slightly increased since 1/31/2020 and small, nonavid left lower lobe opacity, similar in appearance since 7/25/2023. A three-month follow-up with dedicated CT of chest is recommended for further evaluation.  Report emailed to Dr. Osvaldo Alfaro.  --- End of Report --- JARED BARAHONA MD; Attending Nuclear Medicine This document has been electronically signed. Nov  3 2023 **********************************************************

## 2024-02-01 NOTE — REASON FOR VISIT
[Follow-Up Visit] : a follow-up [Other: ______] : provided by ANJANA [FreeTextEntry2] : prostate cancer [Interpreters_FullName] : Agueda Kirk [[id number]] : American Sign Language

## 2024-02-01 NOTE — PHYSICAL EXAM
[Fully active, able to carry on all pre-disease performance without restriction] : Status 0 - Fully active, able to carry on all pre-disease performance without restriction [Normal] : affect appropriate [de-identified] : no  edema

## 2024-02-07 ENCOUNTER — NON-APPOINTMENT (OUTPATIENT)
Age: 69
End: 2024-02-07

## 2024-02-07 ENCOUNTER — APPOINTMENT (OUTPATIENT)
Dept: INTERNAL MEDICINE | Facility: CLINIC | Age: 69
End: 2024-02-07
Payer: MEDICARE

## 2024-02-07 VITALS
WEIGHT: 215 LBS | SYSTOLIC BLOOD PRESSURE: 124 MMHG | OXYGEN SATURATION: 97 % | HEIGHT: 70.5 IN | DIASTOLIC BLOOD PRESSURE: 75 MMHG | TEMPERATURE: 97.7 F | BODY MASS INDEX: 30.44 KG/M2 | HEART RATE: 75 BPM

## 2024-02-07 DIAGNOSIS — Z00.00 ENCOUNTER FOR GENERAL ADULT MEDICAL EXAMINATION W/OUT ABNORMAL FINDINGS: ICD-10-CM

## 2024-02-07 DIAGNOSIS — I10 ESSENTIAL (PRIMARY) HYPERTENSION: ICD-10-CM

## 2024-02-07 PROCEDURE — 36415 COLL VENOUS BLD VENIPUNCTURE: CPT

## 2024-02-07 PROCEDURE — G0439: CPT

## 2024-02-07 RX ORDER — ATORVASTATIN CALCIUM 10 MG/1
10 TABLET, FILM COATED ORAL
Refills: 0 | Status: DISCONTINUED | COMMUNITY
End: 2024-02-07

## 2024-02-07 NOTE — ADDENDUM
[FreeTextEntry1] : I, Marychuy Perez, acted as a scribe on behalf of Dr. Obinna Masters MD, on 02/07/2024.   All medical entries made by the scribe were at my, Dr. Obinna Masters MD, direction and personally dictated by me on 02/07/2024. I have reviewed the chart and agree that the record accurately reflects my personal performance of the history, physical exam, assessment and plan. I have also personally directed, reviewed, and agreed with the chart.

## 2024-02-07 NOTE — HEALTH RISK ASSESSMENT
[Good] : ~his/her~  mood as  good [No] : In the past 12 months have you used drugs other than those required for medical reasons? No [Feels Safe at Home] : Feels safe at home [Fully functional (bathing, dressing, toileting, transferring, walking, feeding)] : Fully functional (bathing, dressing, toileting, transferring, walking, feeding) [Fully functional (using the telephone, shopping, preparing meals, housekeeping, doing laundry, using] : Fully functional and needs no help or supervision to perform IADLs (using the telephone, shopping, preparing meals, housekeeping, doing laundry, using transportation, managing medications and managing finances) [Reports normal functional visual acuity (ie: able to read med bottle)] : Reports normal functional visual acuity [Smoke Detector] : smoke detector [Carbon Monoxide Detector] : carbon monoxide detector [Safety elements used in home] : safety elements used in home [Seat Belt] :  uses seat belt [Sunscreen] : uses sunscreen [Never] : Never [FreeTextEntry1] : Health Maintenance [de-identified] : Oncology, Dermatology, Podiatrist [Patient reported colonoscopy was normal] : Patient reported colonoscopy was normal [Change in mental status noted] : No change in mental status noted [Language] : denies difficulty with language [Behavior] : denies difficulty with behavior [Learning/Retaining New Information] : denies difficulty learning/retaining new information [Handling Complex Tasks] : denies difficulty handling complex tasks [Reasoning] : denies difficulty with reasoning [Spatial Ability and Orientation] : denies difficulty with spatial ability and orientation [Reports changes in hearing] : Reports no changes in hearing [Reports changes in vision] : Reports no changes in vision [Reports changes in dental health] : Reports no changes in dental health [Guns at Home] : no guns at home [Travel to Developing Areas] : does not  travel to developing areas [TB Exposure] : is not being exposed to tuberculosis [ColonoscopyDate] : 2020

## 2024-02-07 NOTE — HISTORY OF PRESENT ILLNESS
[FreeTextEntry1] : Patient presents to establish care and annual wellness visit.  [de-identified] : A 68 M pt presents to office with Hx of HTN, HLD States he has not been taking statin. Reports he is scheduled for Lung CT scan tomorrow given incidental finding of lung nodule. Follows with Dermatologist, Oncologist. Denies any CP, Chest tightness, SOB, abdominal pain, urinary symptom or change in bowel habits.  Denies any anxiety, depression or insomnia.  Does have hearing loss L > R. He has tried hearing aid however gets headache. Plans to follow with ophthalmology. Last Colonoscopy about 3-4 years ago. UTD with vaccines. Requesting for podiatry referral.

## 2024-02-07 NOTE — ASSESSMENT
[FreeTextEntry1] : Annual Wellness Visit -BP is stable. Continue current management. -Check A1c, lipid panel and Vitamin levels -Check PSA levels. -Continue to f/u with Specialists. -Continue with Healthy diet. UTD with vaccine. -RTO in 6 months.

## 2024-02-08 ENCOUNTER — APPOINTMENT (OUTPATIENT)
Dept: CT IMAGING | Facility: IMAGING CENTER | Age: 69
End: 2024-02-08
Payer: MEDICARE

## 2024-02-08 ENCOUNTER — OUTPATIENT (OUTPATIENT)
Dept: OUTPATIENT SERVICES | Facility: HOSPITAL | Age: 69
LOS: 1 days | End: 2024-02-08
Payer: MEDICARE

## 2024-02-08 DIAGNOSIS — C61 MALIGNANT NEOPLASM OF PROSTATE: ICD-10-CM

## 2024-02-08 DIAGNOSIS — K40.90 UNILATERAL INGUINAL HERNIA, WITHOUT OBSTRUCTION OR GANGRENE, NOT SPECIFIED AS RECURRENT: Chronic | ICD-10-CM

## 2024-02-08 PROCEDURE — 71260 CT THORAX DX C+: CPT

## 2024-02-08 PROCEDURE — 71260 CT THORAX DX C+: CPT | Mod: 26,MH

## 2024-02-15 LAB
25(OH)D3 SERPL-MCNC: 40.7 NG/ML
ALBUMIN SERPL ELPH-MCNC: 4.2 G/DL
ALP BLD-CCNC: 71 U/L
ALT SERPL-CCNC: 21 U/L
ANION GAP SERPL CALC-SCNC: 13 MMOL/L
APPEARANCE: CLEAR
AST SERPL-CCNC: 22 U/L
BASOPHILS # BLD AUTO: 0.03 K/UL
BASOPHILS NFR BLD AUTO: 0.6 %
BILIRUB SERPL-MCNC: 0.3 MG/DL
BILIRUBIN URINE: NEGATIVE
BLOOD URINE: NEGATIVE
BUN SERPL-MCNC: 24 MG/DL
CALCIUM SERPL-MCNC: 9.4 MG/DL
CHLORIDE SERPL-SCNC: 104 MMOL/L
CHOLEST SERPL-MCNC: 196 MG/DL
CO2 SERPL-SCNC: 26 MMOL/L
COLOR: NORMAL
CREAT SERPL-MCNC: 1.39 MG/DL
EGFR: 55 ML/MIN/1.73M2
EOSINOPHIL # BLD AUTO: 0.14 K/UL
EOSINOPHIL NFR BLD AUTO: 2.9 %
ESTIMATED AVERAGE GLUCOSE: 134 MG/DL
GLUCOSE QUALITATIVE U: NEGATIVE MG/DL
GLUCOSE SERPL-MCNC: 99 MG/DL
HBA1C MFR BLD HPLC: 6.3 %
HCT VFR BLD CALC: 42.5 %
HDLC SERPL-MCNC: 37 MG/DL
HGB BLD-MCNC: 13.7 G/DL
IMM GRANULOCYTES NFR BLD AUTO: 0.2 %
KETONES URINE: NEGATIVE MG/DL
LDLC SERPL CALC-MCNC: 133 MG/DL
LEUKOCYTE ESTERASE URINE: NEGATIVE
LYMPHOCYTES # BLD AUTO: 1.73 K/UL
LYMPHOCYTES NFR BLD AUTO: 36.3 %
MAN DIFF?: NORMAL
MCHC RBC-ENTMCNC: 30.6 PG
MCHC RBC-ENTMCNC: 32.2 GM/DL
MCV RBC AUTO: 95.1 FL
MONOCYTES # BLD AUTO: 0.58 K/UL
MONOCYTES NFR BLD AUTO: 12.2 %
NEUTROPHILS # BLD AUTO: 2.27 K/UL
NEUTROPHILS NFR BLD AUTO: 47.8 %
NITRITE URINE: NEGATIVE
NONHDLC SERPL-MCNC: 159 MG/DL
PH URINE: 5.5
PLATELET # BLD AUTO: 206 K/UL
POTASSIUM SERPL-SCNC: 4.5 MMOL/L
PROT SERPL-MCNC: 6.9 G/DL
PROTEIN URINE: NEGATIVE MG/DL
RBC # BLD: 4.47 M/UL
RBC # FLD: 12.3 %
SODIUM SERPL-SCNC: 142 MMOL/L
SPECIFIC GRAVITY URINE: 1.02
TRIGL SERPL-MCNC: 145 MG/DL
TSH SERPL-ACNC: 3.07 UIU/ML
UROBILINOGEN URINE: 0.2 MG/DL
VIT B12 SERPL-MCNC: 775 PG/ML
WBC # FLD AUTO: 4.76 K/UL

## 2024-04-01 RX ORDER — ATENOLOL 50 MG/1
50 TABLET ORAL
Qty: 90 | Refills: 3 | Status: ACTIVE | COMMUNITY
Start: 1900-01-01 | End: 1900-01-01

## 2024-04-10 ENCOUNTER — OUTPATIENT (OUTPATIENT)
Dept: OUTPATIENT SERVICES | Facility: HOSPITAL | Age: 69
LOS: 1 days | Discharge: HOME | End: 2024-04-10

## 2024-04-10 DIAGNOSIS — C61 MALIGNANT NEOPLASM OF PROSTATE: ICD-10-CM

## 2024-04-10 DIAGNOSIS — K40.90 UNILATERAL INGUINAL HERNIA, WITHOUT OBSTRUCTION OR GANGRENE, NOT SPECIFIED AS RECURRENT: Chronic | ICD-10-CM

## 2024-04-22 ENCOUNTER — NON-APPOINTMENT (OUTPATIENT)
Age: 69
End: 2024-04-22

## 2024-04-23 ENCOUNTER — APPOINTMENT (OUTPATIENT)
Dept: HEMATOLOGY ONCOLOGY | Facility: CLINIC | Age: 69
End: 2024-04-23
Payer: MEDICARE

## 2024-04-23 ENCOUNTER — RESULT REVIEW (OUTPATIENT)
Age: 69
End: 2024-04-23

## 2024-04-23 ENCOUNTER — APPOINTMENT (OUTPATIENT)
Dept: INFUSION THERAPY | Facility: HOSPITAL | Age: 69
End: 2024-04-23

## 2024-04-23 VITALS
TEMPERATURE: 97.9 F | OXYGEN SATURATION: 97 % | SYSTOLIC BLOOD PRESSURE: 129 MMHG | DIASTOLIC BLOOD PRESSURE: 81 MMHG | BODY MASS INDEX: 30.28 KG/M2 | HEART RATE: 60 BPM | RESPIRATION RATE: 16 BRPM | WEIGHT: 214.07 LBS

## 2024-04-23 LAB
BASOPHILS # BLD AUTO: 0.02 K/UL — SIGNIFICANT CHANGE UP (ref 0–0.2)
BASOPHILS NFR BLD AUTO: 0.4 % — SIGNIFICANT CHANGE UP (ref 0–2)
EOSINOPHIL # BLD AUTO: 0.09 K/UL — SIGNIFICANT CHANGE UP (ref 0–0.5)
EOSINOPHIL NFR BLD AUTO: 1.7 % — SIGNIFICANT CHANGE UP (ref 0–6)
HCT VFR BLD CALC: 41.6 % — SIGNIFICANT CHANGE UP (ref 39–50)
HGB BLD-MCNC: 13.7 G/DL — SIGNIFICANT CHANGE UP (ref 13–17)
IMM GRANULOCYTES NFR BLD AUTO: 0.2 % — SIGNIFICANT CHANGE UP (ref 0–0.9)
LYMPHOCYTES # BLD AUTO: 2.01 K/UL — SIGNIFICANT CHANGE UP (ref 1–3.3)
LYMPHOCYTES # BLD AUTO: 37.6 % — SIGNIFICANT CHANGE UP (ref 13–44)
MCHC RBC-ENTMCNC: 31.2 PG — SIGNIFICANT CHANGE UP (ref 27–34)
MCHC RBC-ENTMCNC: 32.9 G/DL — SIGNIFICANT CHANGE UP (ref 32–36)
MCV RBC AUTO: 94.8 FL — SIGNIFICANT CHANGE UP (ref 80–100)
MONOCYTES # BLD AUTO: 0.6 K/UL — SIGNIFICANT CHANGE UP (ref 0–0.9)
MONOCYTES NFR BLD AUTO: 11.2 % — SIGNIFICANT CHANGE UP (ref 2–14)
NEUTROPHILS # BLD AUTO: 2.61 K/UL — SIGNIFICANT CHANGE UP (ref 1.8–7.4)
NEUTROPHILS NFR BLD AUTO: 48.9 % — SIGNIFICANT CHANGE UP (ref 43–77)
NRBC # BLD: 0 /100 WBCS — SIGNIFICANT CHANGE UP (ref 0–0)
PLATELET # BLD AUTO: 175 K/UL — SIGNIFICANT CHANGE UP (ref 150–400)
RBC # BLD: 4.39 M/UL — SIGNIFICANT CHANGE UP (ref 4.2–5.8)
RBC # FLD: 12.3 % — SIGNIFICANT CHANGE UP (ref 10.3–14.5)
WBC # BLD: 5.34 K/UL — SIGNIFICANT CHANGE UP (ref 3.8–10.5)
WBC # FLD AUTO: 5.34 K/UL — SIGNIFICANT CHANGE UP (ref 3.8–10.5)

## 2024-04-23 PROCEDURE — G2211 COMPLEX E/M VISIT ADD ON: CPT

## 2024-04-23 PROCEDURE — 99215 OFFICE O/P EST HI 40 MIN: CPT

## 2024-04-23 NOTE — ASSESSMENT
[Palliative Care Plan] : not applicable at this time [FreeTextEntry1] : Isaac continues on Lupron monotherapy at this time.  He is seen with the aid of an .  He had a [place] 8 tumor.  He feels well and he reports no pains.  He continues on Lupron monotherapy with a rising PSA.  He feels "fine".  He has some rare hot flushes which usually occur in the morning and they are brief duration.  No fatigue is noted.  He denies any pains.  His appetite is good.  He says that he changed his diet to eat healthier.  His weight is stable.  Urinary flow is "normal".  Nocturia occurs 0-2 times.  There is no incontinence, hematuria, or dysuria.  He denies any edema of the extremities.  There is no complaint of chest pain or chest pressure.  There were no GI complaints.  On physical examination, he appears well.  His performance status is 0.  The blood pressure is 129/81 and his weight was 97.1 kg.  The oxygen saturation is 97%.  The physical examination is unremarkable.  Today's CBC revealed a white blood cell count of 5.34 with a hemoglobin value of 13.7 g.  The platelet count was 1 75,000.  The chemistry panel and PSA are pending at this time.  His most recent PSA was from January 22 with a value of 2.24.  His PSA was 1.22 in October 2023.  Before that it was 0.34 in July and 0.1 in October 2023.  He had undetectable PSAs up until November 2022.  He had a PSMA PET scan on November 3, 2023.  This revealed a retrocrural lymph node measuring 0.9 x 0.9 cm with an SUV of 11.3.  In 2020 that lymph node was present measuring 0.9 x 0.5.  Minimally avid 1 cm sclerotic density in the posterior right iliac bone is unchanged, and the radiologist felt that this is probably benign.  It was however seen on a nuclear medicine bone scan in the past.  We will see what his PSA is today.  If it continues to rise, we will repeat his PSMA scan to assess the status of his disease.  He had a liquid biopsy performed revealing a BRCA2 mutation.  I explained the meaning of this.  He does not have any children, but he has a sister, and for this reason he should have germline testing performed.  He says that his mother had breast cancer in the past.  A referral was made to the genetic counselor.  I explained what they would do.  They usually see patients by telehealth and then send the collection sample for saliva to the home.  They were advised of the need for an  throughout this process.  If treatment is to begin, he would be started on abiraterone and prednisone along with olaparib as well.  I wrote down the names of these drugs, and I explained what their role is in the management of castrate resistant metastatic prostate cancer.  The addition of olaparib and BRCA mutations has shown to notably improve survival in patients with castrate resistant prostate cancer who are on abiraterone.  All questions were answered to the best of my ability and to his apparent satisfaction.

## 2024-04-23 NOTE — HISTORY OF PRESENT ILLNESS
[Disease: _____________________] : Disease: [unfilled] [T: ___] : T[unfilled] [N: ___] : N[unfilled] [M: ___] : M[unfilled] [AJCC Stage: ____] : AJCC Stage: [unfilled] [de-identified] : Mr. Turk is seen in consultation on February 11, 2020. In 2017, he was found to have a markedly elevated PSA. PSA was 30.8.  He had a history of prior negative prostate biopsy in (2015). He had an MRI performed which revealed clinically significant disease. He underwent a targeted biopsy which revealed [place] 9 in the left mid zone. Tumor was grade group 5 involving 80% of one core. Another biopsy in the same zone was grade group for involving 25% of one core. The left apex showed adenocarcinoma of prostate, grade group 4 involving 95% and 30%, of 2 out of 2 cores. All other cores were negative. CT-guided core biopsy was performed of the right iliac bone, which did not reveal evidence of metastases. He underwent a radical prostatectomy in the latter part of 2017. Adenocarcinoma of the prostate, prognostic grade group for (John 4+4 equals 8) with a tertiary pattern 5 was present. Disease was confined to the gland. One regional lymph node of 3 resected was positive for tumor, measuring 9 mm in size. His PSA was not undetectable in the postoperative setting, and he was referred for radiation therapy. This was administered from January 22 of 2018 until March 16, 2018. The total dose was 7000 cGy. he received a 6 month Lupron injection post op, (12/12/17) planned for 3 years of treatment, but insurance changes and apparently denied.  On January 7, 2020, he had a PSA of 2.64. He was referred for a PET scan, and referred for medical oncology consultation. PSA values that are available revealed 24.42 on March 28, 2017. On June 19, 2017 it was 28.86 in August 15, 2017 it was 30.84. Postoperatively, the PSA was 1.69 on December 5, 2017 and 1.06 on September 9, 2019. It was 1.53 on October 1, 2019 and 2.64 on January 7, 2020. He had a PET CT done, revealed no evidence of mets. Feels well, no pains noted. Urine flow is good, has incontinence. No blood, no dysuria. Nocturia x 2. Wears a pad during the day. He has been recommended a Cunningham  clamp.   5/19/20...Feels fine at this time, last seen 3 months ago. No pains. Urine flow is good, better sitting compared with standing. He has urgency, no incontinence. No blood, no dysuria. Nocturia 2-3 times, senses some frequency during the day. No back pains, no weakness. No edema of the legs. Appetite is normal, weight is stable. No fevers, nom chills, no recent infections. No fatigue. No cough, no GRAFF.   8/18/20...Feels well. No pains. No hot flushes. No fatigue. Urine flow is good. has incontinence with change in positions. wears a pad. No blood, no dysuria. Nocturia x 1-3 variable at times. he feels that if he eats protein for dinner he gets up more. No cough, No GRAFF, No edema. Appetite has been good, weight stable. No coronavirus infection. Lives with a roommate. Would like to have a COVID antibody test.   12/15/20...Due Shayla today. , live. He was seen by Dr Metz via video link. I believe this as for an artificial sphincter discussion, but he does not have good understanding. No pains. Appetite is good, no weight loss. No hot flushes. No fatigue. Urine stream is weak and urine sprays, has to sit to void. Nocturia  occasionally. Wears a pad. NO blood, no dysuria. No edema. No pains, no cough, no dyspnea. No fevers, no chills. Independent in ADls.   3/10/21 - radical prostatectomy followed by RT for [place] 9 prostate cancer, 2017. he had an artificial sphincter revision in January. Flow is better, with full control at this time. Nocturia is less, now at 2-3, prior 5. No dysuria. no blood. Appetite is good, no weight loss. Weight is up 3 pounds form last visit. No coronavirus vaccine as yet, unable to get an appt. No edema.no cough, no GRAFF. No fevers, no chills. No pains in the bones. No hot flushes. getting his Eligard today. No N/V/D/C. had colonoscopy and EGD, no issues.   6/9/21 - Eligard today. Feels well. seeing cardiologist next week for a check up, no issues. Has some knee arthritis, mostly when he is seated, not an issue with walking. Present gradually since 2013. NO meds used. Had CV vaccine. No hot flushes from the Eligard. No edema. no chest pains/palpitations. no chest pressure. Appetite is good, weight is stable. Urine flow is good, Has a new artificial sphincter, with no issues at all. No dysuria, no blood. No incontinence. Nocturia variable.   9/28/21 - went to derm as hr had some lesions removed. he then had cryo of the lesions./ Feels well overall. NO pains. Urine flow is improved when he sits, not strong when standing. Nocturia x 2, no incontinence. . No blood, no dysuria. No hot flushes, no significant fatigue. Appetite is good. No edema . No cough, no GRAFF. No chest pains/pressure. Derm note reviewed, they were SKs.    2/2/22, rescheduled from 12/28/21...Using [place] video . "I'm fine". No pains. No fatigue. No hot flushes. Appetite is good, weight is stable. Urine flow is good, no incontinence unless with lifting. Nocturia x 1-2, improved. No blood, no dysuria. No edema. No chest pain/pressure. No cough, no GRAFF. No fevers, no chills. He did have a fever last month and cancelled his appt one month ago. Had his Covid booster shot. he was not tested, had rhinorrhea and fever with cough. No pains noted.   5/11/22...had shingles in April. Started on atenolol. rash resolved, has residual pain except for an episode of stabbing pain last week for one day. He feels fine. However, he does experience some pain at night in the area, a pinch like discomfort. C/W post herpetic neuralgia. Appetite is good, dropped a few pounds, trying to lose weight. No hot flushes noted. Urine flow is good, sits to void, no incontinence he says, then describes a Cunningham clamp. Nocturia 4-6 times, more since the pain in the right thigh area. feels somewhat different after the shingles, feels he has more pain when sitting and when more active. No cough, no GRAFF. No chest pain/pressure/palpitations. No edema. No pains in the bones. No N/V/D/C.   8/10/22...summary to date: John 9 disease documented in early 2017.  He had a prior negative biopsy.  His PSA was up to 30.  An MRI was performed.  He underwent a radical prostatectomy and this revealed [place] 8 (4+4) with tertiary pattern 5.  1 of 3 lymph nodes resected was positive for tumor with a metastatic deposit of 9 mm in size.  In the postoperative setting, his PSA was detectable and he was referred for radiation therapy.  He was initially started on Lupron in addition to radiation therapy with a plan for 3 years of treatment, but insurance denied that plan and he received at least 1 dose for 6 months.  In January 2020, he had a PSA of 2.64 and he had a PET scan performed.  He remains on Eligard and his PSA has been undetectable since December 15, 2020.  He has not had any recent imaging since the PET scan in January 2020.  2 areas of focal activity was seen, 1 in each inguinal region associated with normal-sized lymph nodes.  This was an [facility] PET/CT..  There was no clear evidence of metastatic disease.  utilized.  He reports that he feels well.  He has no pains.  He is due for Lupron today.  He does not have any hot flushes.  He denies any fatigue.  There were no headaches or dizziness.  His appetite is good and there is no weight loss.  He has no cough or shortness of breath.  There were no GI complaints.  Urinary flow is normal.  Nocturia occurs approximately once per night.  There is no incontinence or urgency.  There is no dysuria or hematuria.  He reports no edema.  There is no chest pain chest pressure or palpitations.  11/9/22 - continues on Lupron monotherapy.  not available for today's visit but pt able to read lips well. Pt states is feeling well. Offers no complaints. Nocturia 1-2. .  He denies any hot flushes.  He denies any fatigue.  There were no headaches or dizziness.  His appetite is good and there is no weight loss.  He has no cough or shortness of breath.  There were no GI complaints.  Urinary flow is normal.  Nocturia occurs approximately once per night.  There is no incontinence or urgency.  There is no dysuria or hematuria.  He reports no edema.  There is no chest pain chest pressure or palpitations. No new medications or changes. States he has started traveling and has been enjoying visiting family.  2/1/23 - continues on Lupron monotherapy. [place] 8, bone mets. PSA undetectable since December 2020. No recent imaging, last DEXA August 2022, normal. Overall feeling "good". Notes intermittent dizziness and vertigo approx twice a year when he feels "everything is moving" and very off balance, he has to close his eyes and lie down, it resolves after a day or so. urine flow is normal, nocturia x 2. No incontinence, NO blood, no dysuria. No hot flushes, no fatigue. Appetite is good, no weight loss. No headaches. No paresthesias. No N/V/D./C. No edema. No chest pain/pressure/palpitations.  Has some acid reflux at times. NO fatigue  4/25/23 remains on Lupron monotherapy. [place] 8, bone mets. PSA undetectable since December 2020. No recent imaging, last DEXA  August 2022, normal.  Lupron today.  feels well, feet are much better, he was having pains i his ankles, resolved. saw a podiatrist, had some injections. No gout. pains resolved with the shots. Appetite is good, no weight loss. Hot flushes do not occur. No fatigue. No headaches, no dizziness. NO chest pain/pressure/palpitations.  No cough, No GRAFF. Urine flow is normal, nocturia x 1. No blood, no dysuria. NO incontinence, NO urgency. No N/V/D/C. No health issues since last visit. Has artificial sphincter seen in follow up by Dr Metz recently, scoped and all was well, note reviewed.   7/18/23 ..... on Lupron monotherapy. John 8, bone mets. PSA undetectable since December 2020 until recently. . No recent imaging, last DEXA  August 2022, normal.  Lupron today. Feels "fine", no pains noted. urine flow is good. Nocturia x 1-2. NO blood, no dysuria, no urgency, no incontinence. No edema. No headaches, no dizziness, no balance issues.  No cough, no GRAFF. No chest pain/pressure/palpitations. Appetite is good, weight stable. No hot flushes.   10/17/23 - continues on Lupron monotherapy. John 8, bone mets. Overall feeling well. 3 weeks ago he had a cyst removed from his back and last week he had another cyst removed. Appetite is good. Urine flow is "normal", nocturia 0-2x/night. Denies fever, chills, night sweats, hot flashes, headache, dizziness, balance issues, chest pain, palpitations, SOB, cough, nausea/vomiting, diarrhea/constipation, abdominal pain, dysuria, hematuria, urgency, incontinence, LE edema, bleeding, muscle or joint pain/weakness.  1/22/24... continues on Lupron monotherapy. John 8, bone mets. Feels well, no pains. Appetite is good, no weight loss. No cough, no GRAFF. No chest pain/pressure/palpitatiosn. No edema. NO HA, no dizziness. Urine flow is normal, nocturia x 0-2. No urgency, no incontinence. No blood, no dysuria. No fatigue. Occ hot flushes, mostly in the AM.  [de-identified] : John 4+4, tertiary pattern 5 at prostatectomy [de-identified] : [facility] liquid biopsy performed January 2024 revealed BRCA2 [id number] [de-identified] : 4/23/24... continues on Lupron monotherapy. John 8, no clear bone mets. Small LNs noted. Feels fine. Rare hot flushes, usually in the AM, brief. NO fatigue noted. NO pains. Appetite is good, changed diet. Weight stable. Urine flow is "normal', nocturia x 0-2. No incontinence, no blood, no dysuria. NO edema, no chest pains, pressure/palpitations. No N/V/D/C.

## 2024-04-23 NOTE — REVIEW OF SYSTEMS
[Loss of Hearing] : loss of hearing [Hot Flashes] : hot flashes [Negative] : Gastrointestinal [Fever] : no fever [Chills] : no chills [Fatigue] : no fatigue [Recent Change In Weight] : ~T no recent weight change [Dysphagia] : no dysphagia [Odynophagia] : no odynophagia [Chest Pain] : no chest pain [Palpitations] : no palpitations [Lower Ext Edema] : no lower extremity edema [Wheezing] : no wheezing [Cough] : no cough [SOB on Exertion] : no shortness of breath during exertion [Dysuria] : no dysuria [Incontinence] : no incontinence [Joint Pain] : no joint pain [Joint Stiffness] : no joint stiffness [Skin Rash] : no skin rash [Dizziness] : no dizziness [Difficulty Walking] : no difficulty walking [Anxiety] : no anxiety [Depression] : no depression [Muscle Weakness] : no muscle weakness [Easy Bruising] : no tendency for easy bruising [Easy Bleeding] : no tendency for easy bleeding [FreeTextEntry9] : no cramps [de-identified] : No HA, no numbness, intermittent difference in the top f the feet, right side

## 2024-04-23 NOTE — PHYSICAL EXAM
[Fully active, able to carry on all pre-disease performance without restriction] : Status 0 - Fully active, able to carry on all pre-disease performance without restriction [Normal] : affect appropriate [de-identified] : no edema noted

## 2024-04-23 NOTE — REASON FOR VISIT
[Follow-Up Visit] : a follow-up [Other: ______] : provided by ANJANA [FreeTextEntry2] : prostate cancer. met castrate resistant. [Interpreters_FullName] : Yoni Wagner [TWNoteComboBox1] : American Sign Language

## 2024-04-23 NOTE — RESULTS/DATA
[FreeTextEntry1] : EXAM: [id number] - PETCT WB PSMA SUBS  - ORDERED BY: OSVALDO ALFARO PROCEDURE DATE:  11/03/2023 INTERPRETATION:  CLINICAL INFORMATION: Metastatic prostate cancer on ADT along with rising PSA (1.22 ng/mL). TREATMENT STRATEGY EVALUATION: Subsequent RADIOPHARMACEUTICAL:  9.51 mCi F-18, piflufolastat (Pylarify), I.V. UPTAKE PERIOD: 70 minutes SCANNER: [employer] [id number]  TECHNIQUE:  Following intravenous injection of radiopharmaceutical and above uptake period, PET/CT was obtained from vertex of skull to below the knees. CT was performed during shallow respiration. CT protocol was optimized for PET attenuation correction and anatomic localization and was not designed to produce and cannot replace state-of-the-art diagnostic CT images with specific imaging protocols for different body parts and indications. Images were reconstructed and reviewed in axial, coronal, and sagittal views and three-dimensional MIP.  The standardized uptake values (SUV) are normalized to patient body weight and indicate the highest activity concentration (SUVmax) in a given site. All image numbers refer to axial image number.  PET findings are scored using the Molecular Imaging PSMA Expression Score (Score):  Score 0: Uptake < blood pool - No PSMA expression Score 1: Uptake > blood pool AND < liver - Low PSMA expression Score 2: Uptake > liver AND < parotid gland - Intermediate expression Score 3: Uptake > parotid gland - High expression  (oJe, et. al. Prostate Cancer Molecular Imaging Standardized Evaluation (PROMISE): Proposed miTNM Classification for the Interpretation of PSMA-Ligand PET/CT. J Nucl Med 2018; 59:469-478).  COMPARISON:  No prior PSMA-PET/CT. Axumin-PET/CT dated 1/31/2020, was reviewed.  OTHER STUDIES USED FOR CORRELATION: CT abdomen and pelvis and bone scan dated 10/4/2017 and 7/25/2023  FINDINGS:  HEAD/NECK: Physiologic radiotracer activity in lacrimal and salivary glands.  THORAX: Physiologic radiotracer activity. No enlarged or avid lymph node.  LUNGS: No abnormal radiotracer activity. Branching nodular opacities in right middle lobe, slightly increased since 1/31/2020 (image 138). Small, nonavid left lower lobe opacity, new since 1/31/2020, and similar in appearance as compared to 7/25/2023, allowing for differences in CT technique (image 150).  PLEURA/PERICARDIUM: No abnormal radiotracer activity. No pleural or pericardial effusion.  HEPATOBILIARY/PANCREAS: Physiologic radiotracer activity. For reference, normal liver demonstrates SUV mean 9.0.  SPLEEN: Physiologic radiotracer activity. Normal size.  ADRENAL GLANDS: No abnormal radiotracer activity. No nodule.  KIDNEYS/URINARY BLADDER: Physiologic excreted radiotracer activity.  REPRODUCTIVE ORGANS: No abnormal radiotracer activity. Prostatectomy. Artificial urinary sphincter with reservoir in the right lower quadrant. Physiologic urinary activity in base of penis, just proximal to the artificial sphincter.  ABDOMINOPELVIC LYMPH NODES/RETROPERITONEUM: Small right retrocrural lymph node with increased radiotracer activity (score 2) measures 0.9 x 0.9 cm, SUV 11.3 (image 161). This node measures 0.9 x 0.5 cm on 1/31/2020, and in retrospect demonstrates minimal avidity (SUV 3.6).  BOWEL/PERITONEUM/MESENTERY: A minimally avid 1 cm sclerotic density in the posterior right iliac bone is unchanged on CT since 1/31/2020  VESSELS: Coronary artery calcifications. Few atherosclerotic calcifications.  BONES/SOFT TISSUES: A minimally avid 1 cm sclerotic density in the posterior right iliac bone is unchanged on CT since 2017 (SUV 2.0; image 224).  IMPRESSION: Abnormal whole-body PSMA-PET/CT scan.  1. No scan evidence of recurrent disease in prostate bed.  2. Small PSMA-positive right retrocrural lymph node is compatible with metastasis. The intensity of radiotracer activity suggests intermediate PSMA expression.  3. Minimally avid 1 cm sclerotic density, posterior right iliac bone, unchanged on CT since 2017, probably is benign.  4. Branching nodular opacities and right middle lobe, slightly increased since 1/31/2020 and small, nonavid left lower lobe opacity, similar in appearance since 7/25/2023. A three-month follow-up with dedicated CT of chest is recommended for further evaluation.  Report emailed to Dr. Osvaldo Alfaro.  --- End of Report --- JARED BARAHONA MD; Attending Nuclear Medicine This document has been electronically signed. Nov  3 2023 **********************************************************

## 2024-04-24 LAB
ALBUMIN SERPL ELPH-MCNC: 4.4 G/DL
ALP BLD-CCNC: 71 U/L
ALT SERPL-CCNC: 20 U/L
ANION GAP SERPL CALC-SCNC: 12 MMOL/L
AST SERPL-CCNC: 25 U/L
BILIRUB SERPL-MCNC: 0.4 MG/DL
BUN SERPL-MCNC: 29 MG/DL
CALCIUM SERPL-MCNC: 9.7 MG/DL
CHLORIDE SERPL-SCNC: 102 MMOL/L
CO2 SERPL-SCNC: 26 MMOL/L
CREAT SERPL-MCNC: 1.35 MG/DL
EGFR: 57 ML/MIN/1.73M2
GLUCOSE SERPL-MCNC: 104 MG/DL
POTASSIUM SERPL-SCNC: 4.7 MMOL/L
PROT SERPL-MCNC: 7.1 G/DL
PSA SERPL-MCNC: 3.13 NG/ML
SODIUM SERPL-SCNC: 140 MMOL/L

## 2024-04-25 ENCOUNTER — TRANSCRIPTION ENCOUNTER (OUTPATIENT)
Age: 69
End: 2024-04-25

## 2024-04-29 ENCOUNTER — APPOINTMENT (OUTPATIENT)
Dept: INTERNAL MEDICINE | Facility: CLINIC | Age: 69
End: 2024-04-29
Payer: MEDICARE

## 2024-04-29 ENCOUNTER — LABORATORY RESULT (OUTPATIENT)
Age: 69
End: 2024-04-29

## 2024-04-29 VITALS
DIASTOLIC BLOOD PRESSURE: 73 MMHG | HEIGHT: 70.5 IN | BODY MASS INDEX: 29.73 KG/M2 | WEIGHT: 210 LBS | HEART RATE: 63 BPM | TEMPERATURE: 97.6 F | OXYGEN SATURATION: 97 % | SYSTOLIC BLOOD PRESSURE: 124 MMHG

## 2024-04-29 DIAGNOSIS — R79.89 OTHER SPECIFIED ABNORMAL FINDINGS OF BLOOD CHEMISTRY: ICD-10-CM

## 2024-04-29 DIAGNOSIS — E78.5 HYPERLIPIDEMIA, UNSPECIFIED: ICD-10-CM

## 2024-04-29 PROCEDURE — 36415 COLL VENOUS BLD VENIPUNCTURE: CPT

## 2024-04-29 PROCEDURE — G2211 COMPLEX E/M VISIT ADD ON: CPT

## 2024-04-29 PROCEDURE — 99214 OFFICE O/P EST MOD 30 MIN: CPT

## 2024-04-29 RX ORDER — ATORVASTATIN CALCIUM 10 MG/1
10 TABLET, FILM COATED ORAL
Qty: 1 | Refills: 0 | Status: ACTIVE | COMMUNITY
Start: 2024-04-29 | End: 1900-01-01

## 2024-04-29 NOTE — ADDENDUM
[FreeTextEntry1] : I, Courtney Aguilar, acted as a scribe on behalf of Dr. Obinna Masters MD, on 04/29/2024.   All medical entries made by the scribe were at my, Dr. Obinna Masters MD, direction and personally dictated by me on 04/29/2024. I have reviewed the chart and agree that the record accurately reflects my personal performance of the history, physical exam, assessment and plan. I have also personally directed, reviewed, and agreed with the chart.

## 2024-04-29 NOTE — ASSESSMENT
[FreeTextEntry1] : - EGD (12/23/20 [facility]) shows diverticulosis of large intestine, 2 sessile polyps 3mm reported in cardia region.  - Colonoscopy okay. - Ordered hep panel, BMP, U/S renal per elevated creatine (1.35mg/dL) to further assess kidney function.  - Rx [id number], hyperlipidemia (LDL, 133mg/dL, 02/24)..     - RTO in 3 months   Pt verbalized understanding and will reach should any questions/concerns occur.

## 2024-04-29 NOTE — HISTORY OF PRESENT ILLNESS
[FreeTextEntry1] : Pt presents for followup.  [de-identified] : Pt colonoscopy report shows normal. Bloodwork shows creatine and LDL is elevated. Pt reports not drinking more water due to overactive bladder, taking diuretic as well.  Pt reports he was told to stop Losartan due to negative side effects such as very dry mouth.  Pt reports recent pain in RLQ, now okay. Pt denies Advil, Motrin, herbal supplements, nausea, vomiting. Pt reports being able to walk comfortably for daily function.  Denies any CP, chest tightness or SOB. Denies change in bowel habits. Denies any fever, chills, or night sweats.

## 2024-05-06 ENCOUNTER — TRANSCRIPTION ENCOUNTER (OUTPATIENT)
Age: 69
End: 2024-05-06

## 2024-05-06 LAB
ALBUMIN MFR SERPL ELPH: 56.5 %
ALBUMIN SERPL-MCNC: 3.8 G/DL
ALBUMIN/GLOB SERPL: 1.3 RATIO
ALBUPE: 16.1 %
ALPHA1 GLOB MFR SERPL ELPH: 4.1 %
ALPHA1 GLOB SERPL ELPH-MCNC: 0.3 G/DL
ALPHA1UPE: 37 %
ALPHA2 GLOB MFR SERPL ELPH: 10.6 %
ALPHA2 GLOB SERPL ELPH-MCNC: 0.7 G/DL
ALPHA2UPE: 20.3 %
ANA PAT FLD IF-IMP: ABNORMAL
ANA SER IF-ACNC: ABNORMAL
ANION GAP SERPL CALC-SCNC: 15 MMOL/L
B-GLOBULIN MFR SERPL ELPH: 13.5 %
B-GLOBULIN SERPL ELPH-MCNC: 0.9 G/DL
BETAUPE: 13.9 %
BUN SERPL-MCNC: 28 MG/DL
CALCIUM SERPL-MCNC: 9.7 MG/DL
CHLORIDE SERPL-SCNC: 101 MMOL/L
CO2 SERPL-SCNC: 23 MMOL/L
CREAT SERPL-MCNC: 1.26 MG/DL
CYSTATIN C SERPL-MCNC: 1.14 MG/L
DEPRECATED KAPPA LC FREE/LAMBDA SER: 1.42 RATIO
EGFR: 62 ML/MIN/1.73M2
GAMMA GLOB FLD ELPH-MCNC: 1 G/DL
GAMMA GLOB MFR SERPL ELPH: 15.3 %
GAMMAUPE: 12.7 %
GFR/BSA.PRED SERPLBLD CYS-BASED-ARV: 63 ML/MIN/1.73M2
GLUCOSE SERPL-MCNC: 91 MG/DL
HBV CORE IGM SER QL: NONREACTIVE
HBV SURFACE AG SER QL: NONREACTIVE
IGA 24H UR QL IFE: NORMAL
IGA SER QL IEP: 182 MG/DL
IGG SER QL IEP: 1098 MG/DL
IGM SER QL IEP: 91 MG/DL
INTERPRETATION SERPL IEP-IMP: NORMAL
KAPPA LC 24H UR QL: NORMAL
KAPPA LC CSF-MCNC: 1.75 MG/DL
KAPPA LC SERPL-MCNC: 2.48 MG/DL
M PROTEIN SPEC IFE-MCNC: NORMAL
POTASSIUM SERPL-SCNC: 4.9 MMOL/L
PROT PATTERN 24H UR ELPH-IMP: NORMAL
PROT SERPL-MCNC: 6.8 G/DL
PROT SERPL-MCNC: 6.8 G/DL
PROT UR-MCNC: 4 MG/DL
PROT UR-MCNC: 4 MG/DL
SODIUM SERPL-SCNC: 139 MMOL/L

## 2024-05-07 ENCOUNTER — APPOINTMENT (OUTPATIENT)
Dept: OPHTHALMOLOGY | Facility: CLINIC | Age: 69
End: 2024-05-07
Payer: MEDICARE

## 2024-05-07 ENCOUNTER — NON-APPOINTMENT (OUTPATIENT)
Age: 69
End: 2024-05-07

## 2024-05-07 PROCEDURE — 92250 FUNDUS PHOTOGRAPHY W/I&R: CPT

## 2024-05-07 PROCEDURE — 92014 COMPRE OPH EXAM EST PT 1/>: CPT

## 2024-05-09 ENCOUNTER — APPOINTMENT (OUTPATIENT)
Dept: NUCLEAR MEDICINE | Facility: IMAGING CENTER | Age: 69
End: 2024-05-09
Payer: MEDICARE

## 2024-05-09 ENCOUNTER — OUTPATIENT (OUTPATIENT)
Dept: OUTPATIENT SERVICES | Facility: HOSPITAL | Age: 69
LOS: 1 days | End: 2024-05-09
Payer: MEDICARE

## 2024-05-09 DIAGNOSIS — K40.90 UNILATERAL INGUINAL HERNIA, WITHOUT OBSTRUCTION OR GANGRENE, NOT SPECIFIED AS RECURRENT: Chronic | ICD-10-CM

## 2024-05-09 DIAGNOSIS — C61 MALIGNANT NEOPLASM OF PROSTATE: ICD-10-CM

## 2024-05-09 PROCEDURE — A9595: CPT

## 2024-05-09 PROCEDURE — 78816 PET IMAGE W/CT FULL BODY: CPT | Mod: 26,MH

## 2024-05-09 PROCEDURE — 78816 PET IMAGE W/CT FULL BODY: CPT

## 2024-05-14 ENCOUNTER — APPOINTMENT (OUTPATIENT)
Dept: HEMATOLOGY ONCOLOGY | Facility: CLINIC | Age: 69
End: 2024-05-14
Payer: MEDICARE

## 2024-05-14 VITALS
RESPIRATION RATE: 16 BRPM | OXYGEN SATURATION: 97 % | WEIGHT: 215.17 LBS | HEART RATE: 64 BPM | BODY MASS INDEX: 30.44 KG/M2 | DIASTOLIC BLOOD PRESSURE: 76 MMHG | SYSTOLIC BLOOD PRESSURE: 118 MMHG | TEMPERATURE: 97.9 F

## 2024-05-14 DIAGNOSIS — C61 MALIGNANT NEOPLASM OF PROSTATE: ICD-10-CM

## 2024-05-14 DIAGNOSIS — R97.21 RISING PSA FOLLOWING TREATMENT FOR MALIGNANT NEOPLASM OF PROSTATE: ICD-10-CM

## 2024-05-14 DIAGNOSIS — Z79.818 LONG TERM (CURRENT) USE OF OTHER AGENTS AFFECTING ESTROGEN RECEPTORS AND ESTROGEN LEVELS: ICD-10-CM

## 2024-05-14 DIAGNOSIS — Z15.09 MALIGNANT NEOPLASM OF PROSTATE: ICD-10-CM

## 2024-05-14 DIAGNOSIS — Z15.89 MALIGNANT NEOPLASM OF PROSTATE: ICD-10-CM

## 2024-05-14 DIAGNOSIS — R59.0 LOCALIZED ENLARGED LYMPH NODES: ICD-10-CM

## 2024-05-14 DIAGNOSIS — Z15.01 MALIGNANT NEOPLASM OF PROSTATE: ICD-10-CM

## 2024-05-14 PROCEDURE — 99214 OFFICE O/P EST MOD 30 MIN: CPT

## 2024-05-14 PROCEDURE — G2211 COMPLEX E/M VISIT ADD ON: CPT

## 2024-05-14 NOTE — REVIEW OF SYSTEMS
[Loss of Hearing] : loss of hearing [Negative] : Gastrointestinal [Fever] : no fever [Chills] : no chills [Fatigue] : no fatigue [Recent Change In Weight] : ~T no recent weight change [Dysphagia] : no dysphagia [Odynophagia] : no odynophagia [Chest Pain] : no chest pain [Lower Ext Edema] : no lower extremity edema [Wheezing] : no wheezing [Cough] : no cough [SOB on Exertion] : no shortness of breath during exertion [Dysuria] : no dysuria [Incontinence] : no incontinence [Joint Pain] : no joint pain [Joint Stiffness] : no joint stiffness [Muscle Pain] : no muscle pain [Skin Rash] : no skin rash [Dizziness] : no dizziness [Difficulty Walking] : no difficulty walking [Anxiety] : no anxiety [Depression] : no depression [Hot Flashes] : no hot flashes [Muscle Weakness] : no muscle weakness [Easy Bruising] : no tendency for easy bruising [de-identified] : No HA

## 2024-05-14 NOTE — REASON FOR VISIT
[Follow-Up Visit] : a follow-up [FreeTextEntry2] : prostate cancer. met castrate resistant. [Interpreters_FullName] : Dayne Jacobo [Interpreters_Relationshiptopatient] : Cristi [TWNoteComboBox1] : American Sign Language

## 2024-05-14 NOTE — RESULTS/DATA
[FreeTextEntry1] : EXAM: [id number] - PETCT WB PSMA SUBS  - ORDERED BY: OSVALDO ALFARO PROCEDURE DATE:  05/09/2024 INTERPRETATION:  CLINICAL INFORMATION: 68-year-old male with metastatic prostate cancer on hormonal therapy with rising PSA (3.13 ng/mL).  TREATMENT STRATEGY EVALUATION: Subsequent RADIOPHARMACEUTICAL: 8.735 mCi F-18 Piflufolastat (Pylarify), I.V. I.V. SITE: antecubital right UPTAKE PERIOD: 67 min SCANNER: [employer] Gen2 ORAL CONTRAST: NONE  TECHNIQUE:  Following intravenous injection of radiopharmaceutical and above uptake period, PET/CT was obtained from vertex of skull to below the knees. CT was performed during shallow respiration. CT protocol was optimized for PET attenuation correction and anatomic localization and was not designed to produce and cannot replace state-of-the-art diagnostic CT images with specific imaging protocols for different body parts and indications. Images were reconstructed and reviewed in axial, coronal, and sagittal views and three-dimensional MIP.  The standardized uptake values (SUV) are normalized to patient body weight and indicate the highest activity concentration (SUVmax) in a given site. All image numbers refer to axial image number. PET findings are scored using the Molecular Imaging PSMA Expression Score (Score): Score 0: Uptake < blood pool - No PSMA expression Score 1: Uptake > blood pool AND < liver - Low PSMA expression Score 2: Uptake > liver AND < parotid gland - Intermediate expression Score 3: Uptake > parotid gland - High expression (Joe, et. al. Prostate Cancer Molecular Imaging Standardized Evaluation (PROMISE): Proposed miTNM Classification for the Interpretation of PSMA-Ligand PET/CT. J Nucl Med 2018; 59:469-478). COMPARISON:  PSMA-PET/CT dated 11/3/2023  OTHER STUDIES USED FOR CORRELATION: CT chest dated 2/8/2024 FINDINGS:  HEAD/NECK: Physiologic radiotracer activity in lacrimal and salivary glands. For reference, right parotid gland demonstrates SUV 12.6; previously 9.9.  THORAX: Physiologic radiotracer activity. No enlarged or avid lymph node. Few calcified mediastinal and hilar nodes, unchanged.  LUNGS: No abnormal radiotracer activity. Nonavid tubular branching opacities in the right middle lobe are unchanged, likely secondary to distal airway impaction. Previously noted small nonavid left lower lobe opacity is not well seen.  PLEURA/PERICARDIUM: No abnormal radiotracer activity. No pleural or pericardial effusion.  HEPATOBILIARY/PANCREAS: Physiologic radiotracer activity. For reference, normal liver demonstrates SUV mean 8.6; previously 9.0.  SPLEEN: Physiologic radiotracer activity. Normal size.  ADRENAL GLANDS: No abnormal radiotracer activity. No nodule.  KIDNEYS/URINARY BLADDER: Physiologic excreted radiotracer activity.  REPRODUCTIVE ORGANS: No abnormal radiotracer activity. Prostatectomy. Artificial urinary sphincter with reservoir in the right lower quadrant. Physiologic urinary activity in base of penis, just proximal to the artificial sphincter is again seen.  ABDOMINOPELVIC LYMPH NODES/RETROPERITONEUM: A right retrocrural lymph node with increased radiotracer activity (score 3) is increased in size and avidity, measuring 1.2 x 1.1 cm, SUV 20.2 (image 187); previously measures 0.9 x 0.9 cm, SUV 11.3. This node measures 1 cm on CT dated 2/8/2024.  BOWEL/PERITONEUM/MESENTERY: A minimally avid 1 cm sclerotic density in the posterior right iliac bone is unchanged on CT since 1/31/2020 and is similar in avidity (SUV 2.3; image 261; previous SUV 2.0).  VESSELS: Coronary artery calcifications. Few atherosclerotic calcifications.  BONES/SOFT TISSUES: New tiny focus of increased radiotracer activity in the lateral aspect left seventh rib, without corresponding abnormality on CT (SUV 5.0; image 167). A minimally avid 1 cm sclerotic density in the posterior right iliac bone is unchanged on CT since 2017 (SUV 2.0; image 224).  IMPRESSION: Compared to whole-body PSMA-PET/CT scan dated 11/3/2023  1. No scan evidence of recurrent disease in prostate bed.  2. A PSMA-positive right retrocrural lymph node is increased in size and avidity, compatible with metastasis. The intensity of radiotracer activity suggests high PSMA expression.  3. New tiny focus of increased radiotracer activity in the lateral aspect left seventh rib, without corresponding abnormality on CT, is indeterminate, possibly early bone metastasis (SUV 5.0; image 167). Correlate clinically for history of trauma.  4. Tubular branching opacity in right middle lobe, not significantly changed, compatible with mucoid impaction. Previously noted nonavid left lower lobe opacity is not well seen. Consider 6 month follow-up with dedicated CT of chest.  Report emailed to Dr. Osvaldo Alfaro. --- End of Report --- JARED BARAHONA MD; Attending Nuclear Medicine This document has been electronically signed. May 10 2024

## 2024-05-14 NOTE — ASSESSMENT
[Palliative Care Plan] : not applicable at this time [FreeTextEntry1] : He was brought back into the office to discuss his treatment options.  He requires an .  He had a PSMA PET scan and I called him at home to try to review the results.  However this was not acceptable.  In the interim, I communicated with Dr. Lizz Tse about the feasibility of SBRT to the 1 clear site of disease which is a lymph node in the retrocrural area which has enlarged somewhat since the last scan.  He notes some occasional minor pain in the right groin area.  It does not occur daily, and it resolves after he empties his bladder.  There are no other changes from his last visit.  There were no medication changes noted.  On physical examination, he appears well.  His performance status is 0.  His blood pressure was 118/76 and his weight was 97.6 kg, up 2.3 kg from his last visit.  The oxygen saturation was 97%.  There were no significant findings on physical examination.  I personally reviewed the images before the visit, I reviewed them once again during the visit.  I showed him the area of concern and this is really the only spot at this time.  He continues on Lupron monotherapy.  He could be treated with more systemic options including abiraterone with olaparib, given the fact that he has a BRCA2 mutation.  However, if this is the only site of disease and he has good control via SBRT, he may not require a change in his systemic therapy at this time based on what his PSA does subsequent to SBRT.  All questions were answered to the best of my ability and to his apparent satisfaction.  An in person  was utilized.  I asked Dr. Tse's team via email to reach out to him to set up an appointment to discuss SBRT.

## 2024-05-14 NOTE — HISTORY OF PRESENT ILLNESS
[Disease: _____________________] : Disease: [unfilled] [T: ___] : T[unfilled] [N: ___] : N[unfilled] [M: ___] : M[unfilled] [AJCC Stage: ____] : AJCC Stage: [unfilled] [de-identified] : Mr. Turk is seen in consultation on February 11, 2020. In 2017, he was found to have a markedly elevated PSA. PSA was 30.8.  He had a history of prior negative prostate biopsy in (2015). He had an MRI performed which revealed clinically significant disease. He underwent a targeted biopsy which revealed Riviera 9 in the left mid zone. Tumor was grade group 5 involving 80% of one core. Another biopsy in the same zone was grade group for involving 25% of one core. The left apex showed adenocarcinoma of prostate, grade group 4 involving 95% and 30%, of 2 out of 2 cores. All other cores were negative. CT-guided core biopsy was performed of the right iliac bone, which did not reveal evidence of metastases. He underwent a radical prostatectomy in the latter part of 2017. Adenocarcinoma of the prostate, prognostic grade group for (John 4+4 equals 8) with a tertiary pattern 5 was present. Disease was confined to the gland. One regional lymph node of 3 resected was positive for tumor, measuring 9 mm in size. His PSA was not undetectable in the postoperative setting, and he was referred for radiation therapy. This was administered from January 22 of 2018 until March 16, 2018. The total dose was 7000 cGy. he received a 6 month Lupron injection post op, (12/12/17) planned for 3 years of treatment, but insurance changes and apparently denied.  On January 7, 2020, he had a PSA of 2.64. He was referred for a PET scan, and referred for medical oncology consultation. PSA values that are available revealed 24.42 on March 28, 2017. On June 19, 2017 it was 28.86 in August 15, 2017 it was 30.84. Postoperatively, the PSA was 1.69 on December 5, 2017 and 1.06 on September 9, 2019. It was 1.53 on October 1, 2019 and 2.64 on January 7, 2020. He had a PET CT done, revealed no evidence of mets. Feels well, no pains noted. Urine flow is good, has incontinence. No blood, no dysuria. Nocturia x 2. Wears a pad during the day. He has been recommended a Cunningham  clamp.   5/19/20...Feels fine at this time, last seen 3 months ago. No pains. Urine flow is good, better sitting compared with standing. He has urgency, no incontinence. No blood, no dysuria. Nocturia 2-3 times, senses some frequency during the day. No back pains, no weakness. No edema of the legs. Appetite is normal, weight is stable. No fevers, nom chills, no recent infections. No fatigue. No cough, no GRAFF.   8/18/20...Feels well. No pains. No hot flushes. No fatigue. Urine flow is good. has incontinence with change in positions. wears a pad. No blood, no dysuria. Nocturia x 1-3 variable at times. he feels that if he eats protein for dinner he gets up more. No cough, No GRAFF, No edema. Appetite has been good, weight stable. No coronavirus infection. Lives with a roommate. Would like to have a COVID antibody test.   12/15/20...Due Shayla today. , live. He was seen by Dr eMtz via video link. I believe this as for an artificial sphincter discussion, but he does not have good understanding. No pains. Appetite is good, no weight loss. No hot flushes. No fatigue. Urine stream is weak and urine sprays, has to sit to void. Nocturia  occasionally. Wears a pad. NO blood, no dysuria. No edema. No pains, no cough, no dyspnea. No fevers, no chills. Independent in ADls.   3/10/21 - radical prostatectomy followed by RT for Riviera 9 prostate cancer, 2017. he had an artificial sphincter revision in January. Flow is better, with full control at this time. Nocturia is less, now at 2-3, prior 5. No dysuria. no blood. Appetite is good, no weight loss. Weight is up 3 pounds form last visit. No coronavirus vaccine as yet, unable to get an appt. No edema.no cough, no GRAFF. No fevers, no chills. No pains in the bones. No hot flushes. getting his Eligard today. No N/V/D/C. had colonoscopy and EGD, no issues.   6/9/21 - Eligard today. Feels well. seeing cardiologist next week for a check up, no issues. Has some knee arthritis, mostly when he is seated, not an issue with walking. Present gradually since 2013. NO meds used. Had CV vaccine. No hot flushes from the Eligard. No edema. no chest pains/palpitations. no chest pressure. Appetite is good, weight is stable. Urine flow is good, Has a new artificial sphincter, with no issues at all. No dysuria, no blood. No incontinence. Nocturia variable.   9/28/21 - went to derm as hr had some lesions removed. he then had cryo of the lesions./ Feels well overall. NO pains. Urine flow is improved when he sits, not strong when standing. Nocturia x 2, no incontinence. . No blood, no dysuria. No hot flushes, no significant fatigue. Appetite is good. No edema . No cough, no GRAFF. No chest pains/pressure. Derm note reviewed, they were SKs.    2/2/22, rescheduled from 12/28/21...Using [place] video . "I'm fine". No pains. No fatigue. No hot flushes. Appetite is good, weight is stable. Urine flow is good, no incontinence unless with lifting. Nocturia x 1-2, improved. No blood, no dysuria. No edema. No chest pain/pressure. No cough, no GRAFF. No fevers, no chills. He did have a fever last month and cancelled his appt one month ago. Had his Covid booster shot. he was not tested, had rhinorrhea and fever with cough. No pains noted.   5/11/22...had shingles in April. Started on atenolol. rash resolved, has residual pain except for an episode of stabbing pain last week for one day. He feels fine. However, he does experience some pain at night in the area, a pinch like discomfort. C/W post herpetic neuralgia. Appetite is good, dropped a few pounds, trying to lose weight. No hot flushes noted. Urine flow is good, sits to void, no incontinence he says, then describes a Cunningham clamp. Nocturia 4-6 times, more since the pain in the right thigh area. feels somewhat different after the shingles, feels he has more pain when sitting and when more active. No cough, no GRAFF. No chest pain/pressure/palpitations. No edema. No pains in the bones. No N/V/D/C.   8/10/22...summary to date: John 9 disease documented in early 2017.  He had a prior negative biopsy.  His PSA was up to 30.  An MRI was performed.  He underwent a radical prostatectomy and this revealed Riviera 8 (4+4) with tertiary pattern 5.  1 of 3 lymph nodes resected was positive for tumor with a metastatic deposit of 9 mm in size.  In the postoperative setting, his PSA was detectable and he was referred for radiation therapy.  He was initially started on Lupron in addition to radiation therapy with a plan for 3 years of treatment, but insurance denied that plan and he received at least 1 dose for 6 months.  In January 2020, he had a PSA of 2.64 and he had a PET scan performed.  He remains on Eligard and his PSA has been undetectable since December 15, 2020.  He has not had any recent imaging since the PET scan in January 2020.  2 areas of focal activity was seen, 1 in each inguinal region associated with normal-sized lymph nodes.  This was an [facility] PET/CT..  There was no clear evidence of metastatic disease.  utilized.  He reports that he feels well.  He has no pains.  He is due for Lupron today.  He does not have any hot flushes.  He denies any fatigue.  There were no headaches or dizziness.  His appetite is good and there is no weight loss.  He has no cough or shortness of breath.  There were no GI complaints.  Urinary flow is normal.  Nocturia occurs approximately once per night.  There is no incontinence or urgency.  There is no dysuria or hematuria.  He reports no edema.  There is no chest pain chest pressure or palpitations.  11/9/22 - continues on Lupron monotherapy.  not available for today's visit but pt able to read lips well. Pt states is feeling well. Offers no complaints. Nocturia 1-2. .  He denies any hot flushes.  He denies any fatigue.  There were no headaches or dizziness.  His appetite is good and there is no weight loss.  He has no cough or shortness of breath.  There were no GI complaints.  Urinary flow is normal.  Nocturia occurs approximately once per night.  There is no incontinence or urgency.  There is no dysuria or hematuria.  He reports no edema.  There is no chest pain chest pressure or palpitations. No new medications or changes. States he has started traveling and has been enjoying visiting family.  2/1/23 - continues on Lupron monotherapy. Riviera 8, bone mets. PSA undetectable since December 2020. No recent imaging, last DEXA August 2022, normal. Overall feeling "good". Notes intermittent dizziness and vertigo approx twice a year when he feels "everything is moving" and very off balance, he has to close his eyes and lie down, it resolves after a day or so. urine flow is normal, nocturia x 2. No incontinence, NO blood, no dysuria. No hot flushes, no fatigue. Appetite is good, no weight loss. No headaches. No paresthesias. No N/V/D./C. No edema. No chest pain/pressure/palpitations.  Has some acid reflux at times. NO fatigue  4/25/23 remains on Lupron monotherapy. Riviera 8, bone mets. PSA undetectable since December 2020. No recent imaging, last DEXA  August 2022, normal.  Lupron today.  feels well, feet are much better, he was having pains i his ankles, resolved. saw a podiatrist, had some injections. No gout. pains resolved with the shots. Appetite is good, no weight loss. Hot flushes do not occur. No fatigue. No headaches, no dizziness. NO chest pain/pressure/palpitations.  No cough, No GRAFF. Urine flow is normal, nocturia x 1. No blood, no dysuria. NO incontinence, NO urgency. No N/V/D/C. No health issues since last visit. Has artificial sphincter seen in follow up by Dr Metz recently, scoped and all was well, note reviewed.   7/18/23 ..... on Lupron monotherapy. John 8, bone mets. PSA undetectable since December 2020 until recently. . No recent imaging, last DEXA  August 2022, normal.  Lupron today. Feels "fine", no pains noted. urine flow is good. Nocturia x 1-2. NO blood, no dysuria, no urgency, no incontinence. No edema. No headaches, no dizziness, no balance issues.  No cough, no GRAFF. No chest pain/pressure/palpitations. Appetite is good, weight stable. No hot flushes.   10/17/23 - continues on Lupron monotherapy. John 8, bone mets. Overall feeling well. 3 weeks ago he had a cyst removed from his back and last week he had another cyst removed. Appetite is good. Urine flow is "normal", nocturia 0-2x/night. Denies fever, chills, night sweats, hot flashes, headache, dizziness, balance issues, chest pain, palpitations, SOB, cough, nausea/vomiting, diarrhea/constipation, abdominal pain, dysuria, hematuria, urgency, incontinence, LE edema, bleeding, muscle or joint pain/weakness.  1/22/24... continues on Lupron monotherapy. John 8, bone mets. Feels well, no pains. Appetite is good, no weight loss. No cough, no GRAFF. No chest pain/pressure/palpitatiosn. No edema. NO HA, no dizziness. Urine flow is normal, nocturia x 0-2. No urgency, no incontinence. No blood, no dysuria. No fatigue. Occ hot flushes, mostly in the AM.   4/23/24... continues on Lupron monotherapy. John 8, no clear bone mets. Small LNs noted. Feels fine. Rare hot flushes, usually in the AM, brief. NO fatigue noted. NO pains. Appetite is good, changed diet. Weight stable. Urine flow is "normal', nocturia x 0-2. No incontinence, no blood, no dysuria. NO edema, no chest pains, pressure/palpitations. No N/V/D/C.  [de-identified] : John 4+4, tertiary pattern 5 at prostatectomy [de-identified] : [facility] liquid biopsy performed January 2024 revealed BRCA2 [id number] [de-identified] : 5/14/24... continues on Lupron monotherapy. [place] 8, no clear bone mets. Had repeat PSMA PET, called in to discuss the results. Communicated with Dr Lizz Tse about the feasibility if SBRT to the one clear site of disease. occ minor pain in the right groin area. Not daily, resolves after he empties his bladder.  here to discuss result of PSMA PET with . No other changes since last visit.

## 2024-05-14 NOTE — PHYSICAL EXAM
[Fully active, able to carry on all pre-disease performance without restriction] : Status 0 - Fully active, able to carry on all pre-disease performance without restriction [Normal] : affect appropriate [de-identified] : no edema noted

## 2024-05-15 ENCOUNTER — OUTPATIENT (OUTPATIENT)
Dept: OUTPATIENT SERVICES | Facility: HOSPITAL | Age: 69
LOS: 1 days | Discharge: HOME | End: 2024-05-15

## 2024-05-15 DIAGNOSIS — K40.90 UNILATERAL INGUINAL HERNIA, WITHOUT OBSTRUCTION OR GANGRENE, NOT SPECIFIED AS RECURRENT: Chronic | ICD-10-CM

## 2024-05-22 ENCOUNTER — APPOINTMENT (OUTPATIENT)
Dept: DERMATOLOGY | Facility: CLINIC | Age: 69
End: 2024-05-22
Payer: MEDICARE

## 2024-05-22 DIAGNOSIS — R22.9 LOCALIZED SWELLING, MASS AND LUMP, UNSPECIFIED: ICD-10-CM

## 2024-05-22 DIAGNOSIS — L71.9 ROSACEA, UNSPECIFIED: ICD-10-CM

## 2024-05-22 PROCEDURE — 99214 OFFICE O/P EST MOD 30 MIN: CPT

## 2024-05-22 RX ORDER — METRONIDAZOLE 7.5 MG/G
0.75 CREAM TOPICAL
Qty: 1 | Refills: 3 | Status: ACTIVE | COMMUNITY
Start: 2024-05-22 | End: 1900-01-01

## 2024-05-24 PROBLEM — R22.9 SKIN NODULE: Status: ACTIVE | Noted: 2023-10-04

## 2024-05-24 NOTE — ASSESSMENT
[FreeTextEntry1] : # Rosacea - ETT and papulopustular type, mild exacerbation of chronic disease  - Reviewed risks (as well as mitigation strategies for adverse drug events as applicable), benefits, and alternatives of therapy  - STOP hydrocortisone ointment which was used for seborrheic dermatitis in the past  - START metronidazole cream BID to AA - patient going [place] to [place] in [place], advised sun protection using SPF 30 or higher sunscreen   # Nodule of skin  DDx fibroma such as in Dupuytren disease vs. other  - Discussed that definitive diagnosis and removal would entail an excisional biopsy with hand surgery - patient elects to continue to monitor for now given that the lesion is asymptomatic   RTC 3 month

## 2024-05-24 NOTE — HISTORY OF PRESENT ILLNESS
[FreeTextEntry1] : FU red rash on face [de-identified] : 05/22/2024 02:57 PM MARY BROWN is a 68 year M presenting today for evaluation of the following:  # red bumpy rash on face x years, intermittent, itchy, burning - distributed on above the eyebrows, around the nose, temples - previously was prescribed keto cream and HC ointment, neither of which help much  ASL video  ID [id number] used # nodule on the right palm, asx

## 2024-05-24 NOTE — PHYSICAL EXAM
[FreeTextEntry3] : erythematous telangiectatic patches on the bilateral eyebrows, medial and nasolabial cheeks with scattered papulopustules soft non-tender nodule on the right palm at the base of the 4th finger

## 2024-05-28 PROBLEM — Z92.3 HISTORY OF RADIATION THERAPY: Status: RESOLVED | Noted: 2024-05-28 | Resolved: 2024-05-28

## 2024-05-31 ENCOUNTER — NON-APPOINTMENT (OUTPATIENT)
Age: 69
End: 2024-05-31

## 2024-05-31 ENCOUNTER — APPOINTMENT (OUTPATIENT)
Dept: RADIATION ONCOLOGY | Facility: CLINIC | Age: 69
End: 2024-05-31
Payer: MEDICARE

## 2024-05-31 VITALS
TEMPERATURE: 97.88 F | HEART RATE: 68 BPM | DIASTOLIC BLOOD PRESSURE: 85 MMHG | RESPIRATION RATE: 17 BRPM | BODY MASS INDEX: 29.78 KG/M2 | WEIGHT: 210.54 LBS | SYSTOLIC BLOOD PRESSURE: 140 MMHG | OXYGEN SATURATION: 97 %

## 2024-05-31 DIAGNOSIS — Z92.3 PERSONAL HISTORY OF IRRADIATION: ICD-10-CM

## 2024-05-31 DIAGNOSIS — Z23 ENCOUNTER FOR IMMUNIZATION: ICD-10-CM

## 2024-05-31 DIAGNOSIS — Z87.19 PERSONAL HISTORY OF OTHER DISEASES OF THE DIGESTIVE SYSTEM: ICD-10-CM

## 2024-05-31 PROCEDURE — 99213 OFFICE O/P EST LOW 20 MIN: CPT

## 2024-05-31 RX ORDER — CHOLECALCIFEROL (VITAMIN D3) 25 MCG
TABLET ORAL
Refills: 0 | Status: ACTIVE | COMMUNITY

## 2024-05-31 RX ORDER — THIAMINE HCL 100 MG
TABLET ORAL
Refills: 0 | Status: ACTIVE | COMMUNITY

## 2024-05-31 RX ORDER — IRON/IRON ASP GLY/FA/MV-MIN 38 125-25-1MG
TABLET ORAL
Refills: 0 | Status: ACTIVE | COMMUNITY

## 2024-05-31 RX ORDER — KETOCONAZOLE 20 MG/G
2 CREAM TOPICAL
Qty: 1 | Refills: 3 | Status: DISCONTINUED | COMMUNITY
Start: 2023-09-11 | End: 2024-05-31

## 2024-05-31 RX ORDER — B-COMPLEX WITH VITAMIN C
TABLET ORAL
Refills: 0 | Status: ACTIVE | COMMUNITY

## 2024-05-31 RX ORDER — MUPIROCIN 20 MG/G
2 OINTMENT TOPICAL
Qty: 1 | Refills: 1 | Status: DISCONTINUED | COMMUNITY
Start: 2022-04-28 | End: 2024-05-31

## 2024-05-31 RX ORDER — COLD-HOT PACK
EACH MISCELLANEOUS
Refills: 0 | Status: ACTIVE | COMMUNITY

## 2024-05-31 RX ORDER — HYDROCORTISONE 25 MG/G
2.5 CREAM TOPICAL
Qty: 1 | Refills: 4 | Status: DISCONTINUED | COMMUNITY
Start: 2021-11-16 | End: 2024-05-31

## 2024-05-31 RX ORDER — LACTOBACILLUS ACIDOPHILUS/PECT 30 MG-20MG
TABLET ORAL
Refills: 0 | Status: ACTIVE | COMMUNITY

## 2024-05-31 RX ORDER — PSYLLIUM HUSK 0.4 G
CAPSULE ORAL
Refills: 0 | Status: ACTIVE | COMMUNITY

## 2024-05-31 RX ORDER — MAGNESIUM OXIDE/MAG AA CHELATE 300 MG
CAPSULE ORAL
Refills: 0 | Status: ACTIVE | COMMUNITY

## 2024-05-31 RX ORDER — MAGNESIUM HYDROXIDE 400 MG/5ML
SUSPENSION, ORAL (FINAL DOSE FORM) ORAL
Refills: 0 | Status: ACTIVE | COMMUNITY

## 2024-05-31 NOTE — REASON FOR VISIT
REVIEW OF SYSTEMS:    CONSTITUTIONAL: No weakness, fevers or chills  EYES/ENT: No visual changes;  No vertigo or throat pain   NECK: No pain or stiffness  RESPIRATORY: No cough, wheezing, hemoptysis; No shortness of breath  CARDIOVASCULAR: No chest pain or palpitations  GASTROINTESTINAL: No abdominal or epigastric pain. No nausea, vomiting, or hematemesis; No diarrhea or constipation. No melena or hematochezia.  GENITOURINARY: No dysuria, frequency or hematuria  NEUROLOGICAL: No numbness or weakness  SKIN: No itching, burning, rashes, or lesions   All other review of systems is negative unless indicated above [Consideration for Non-Curative Therapy] : consideration for non-curative therapy for prostate cancer [Other: ______] : provided by ANJANA [Interpreters_IDNumber] : [id number] [Interpreters_FullName] : Abbie Chiu  [TWNoteComboBox1] : American Sign Language

## 2024-05-31 NOTE — DISEASE MANAGEMENT
[>20] : >20 ng/mL [Biopsy with Fusion] : Patient had a biopsy with fusion on [9] : Fusion Biopsy John Score: 9 [] : Patient had a Prostate MRI [5] : 5 [Radical Prostatectomy] : Radical Prostatectomy [Radiation Therapy] : Radiation  Therapy [Patient had a radical prostatectomy] : Patient had a radical prostatectomy  [8] : [place] Score 8 [Positive] : Positive margins [2] : T2 [1] : N1 [X] : MX [Treatment with radiation therapy] : Treatment with radiation therapy [EBRT] : EBRT [BiopsyDate] : 9/5/2017 [MeasuredProstateVolume] : 40 [TotalCores] : 10 [TotalPositiveCores] : 4 [MaxCoreInvolvement] : 95 [IV] : IV [RadicalProstatectomyDate] : 10/30/2017 [TotalNumberofnodesresected] : 3 [PositiveNodes] : 1 [RadiationCompletedDate] : 3/16/2018 [EBRTDose] : 7020cGy [EBRTFractions] : 39 [FreeTextEntry1] : See HPI for details.

## 2024-05-31 NOTE — HISTORY OF PRESENT ILLNESS
[FreeTextEntry1] : Mr. Turk is a 68-year-old male who presents in consultation for consideration of radiation therapy.  He is accompanied by  for today's visit.   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   Foundation medicine liquid biopsy performed January 2024 revealed BRCA2 [id number] and [id number].  History of RADIATION Therapy: 1/22/18 - 3/16/18: Received RADIATION Therapy to Prostate Bed/Pelvic LN, 7020 cGy in 39 fx  (Dr. Del Rio) with ADT x 6 months due to insurance reason.   PSA Trend: 6/19/17 - 28.86 ng/mL *at diagnosis*  8/15/17 - 30.84  12/5/17 - 1.69 *post op* 9/13/18 - 0.1 *post radiation therapy, ADT* 3/11/19 - 0.2 10/1/19 - 1.53 1/7/20 - 2.64 2/11/20 - 3.74 2/19/20 - started on Lupron injections (Dr. Villatoro - [facility])  5/19/20 - 0.04 12/15/20 - 11/9/22: PSA remained < 0.01 ng/mL  2/1/23 - 0.03 4/25/23 - 0.10  718/23 - 0.34 10/17/23 - 1.22 1/22/24 - 2.24 4/23/24 - 3.13  HPI:  Mr. Turk was referred to urology in July 2017 for an elevated PSA of 28.86 ng/mL.   8/3/17 - MRI Pelvis: Prostate volume 40 cc. Lesion #1, low apex central gland at the midline. Lesion in in the very low apex where there is no fibrous capsule. PIRADS 5 No seminal vesicle involvement, no pelvic adenopathy, and no suspicious bone lesions.   9/5/17 - underwent Prostate Biopsy: Pathology - Adenocarcinoma of Prostate in 2/7 sites and 4/10 cores.  John score 4+5=9 in 1 core and Yutan score 4+4=8 in 3 cores. 95% max. involvement.    10/4/17 - CT A/P: Enlarged prostate. Small sclerotic lesion in the right iliac bone is indeterminate. No lymphadenopathy.  10/4/17 - Bone Scan: Small, nonspecific foci of increased tracer accumulation in the spinous processes of L3 and L4 vertebrae and in the right iliac bone adjacent to the right sacroiliac joint. While these findings may represent post traumatic or degenerative changes, metastatic disease cannot be excluded.  Degenerative disease in the major joints.   10/23/17 - MRI Pelvis Bony: A 1.2 cm round low signal lesion adjacent to the right S1 joint within the right iliac bone that does not demonstrate internal fat and has mild peripheral edema signal on T2 fat-suppressed imaging. CT biopsy is warranted to rule out metastatic disease.    10/27/17 - underwent RIGHT Iliac Bone Biopsy:  Pathology - Negative for malignant cells.   10/30/17 - underwent Radical Prostatectomy with Dr. High (urology): Pathology - Adenocarcinoma of Prostate, John score 4+4=8, no extraprostatic extension or seminal vesicle involvement, margins involved by invasive carcinoma (left anterior apex focal and right posterior apex focal). Metastatic carcinoma in 1/3 lymph nodes.  pT2 pN1   12/5/17 - Post op PSA 1.69 ng/mL.  Referred for Post op salvage Radiation therapy.  1/22/18 - 3/16/18: Received RADIATION Therapy to Prostate Bed/Pelvic LN, 7020 cGy in 39 fx  (Dr. Del Rio) with ADT x 6 months due to insurance reason.   1/2020 - PSA now 2.64 ng/mL. PET scan ordered and referral made to medical oncology.   2/3/20 - PET Scan: No clear evidence of metastatic disease is identified. A small amount of activity is seen in a normal sized lymph node in each inguinal region.    2/11/20 - saw Dr. Villatoro ([facility]) in consultation. Despite the fact that there were no findings of note on the PET scan, the elevated PSA indicates that there are malignant cells growing, but they are not detectable at this particular time. Discussed the role of androgen deprivation therapy.  2/19/20 - Started on Lupron injections with Dr. Villatoro ([facility])  11/3/20 - PET Scan: 1. No scan evidence of recurrent disease in prostate bed. 2. Small PSMA-positive right retrocrural lymph node is compatible with metastasis. The intensity of radiotracer activity suggests intermediate PSMA expression. 3. Minimally avid 1 cm sclerotic density, posterior right iliac bone, unchanged on CT since 2017, probably is benign. 4. Branching nodular opacities and right middle lobe, slightly increased since 1/31/2020 and small, nonavid left lower lobe opacity, similar in appearance since 7/25/2023. A three-month follow-up with dedicated CT of chest is recommended for further evaluation.  12/15/20 - 11/9/22: PSA remained < 0.01 ng/mL.  Starting in February 2023 PSA slowly started to rise.  He continued on Lupron injections and follow up with Dr. Villatoro ([facility]).   7/25/23 - Bone Scan:  Findings suspicious for osseous metastasis involving the RIGHT iliac bone. Findings seen elsewhere in the spine largely appear to be due to degenerative change. 7/25/23 - CT A/P: Branching nodular opacities in the right middle lobe are increased from 1/31/2020. New left lower lobe 0.5 cm nodule. Sclerotic lesion in the right iliac bone, suspicious for osseous metastasis.  1017/23 - PSA 1.22 ng/mL. PSMA PET ordered.   11/3/23 - PSMA PET: 1. No scan evidence of recurrent disease in prostate bed. 2. Small PSMA-positive right retrocrural lymph node is compatible with metastasis. The intensity of radiotracer activity suggests intermediate PSMA expression. 3. Minimally avid 1 cm sclerotic density, posterior right iliac bone, unchanged on CT since 2017, probably is benign. 4. Branching nodular opacities and right middle lobe, slightly increased since 1/31/2020 and small, nonavid left lower lobe opacity, similar in appearance since 7/25/2023. A three-month follow-up with dedicated CT of chest is recommended for further evaluation.  Foundation medicine liquid biopsy performed January 2024 revealed BRCA2 [id number] and [id number].  2/8/24 - CT Chest: Unchanged small tubular branching opacities in the lower lobes and middle lobe, likely secondary to distal airway impaction. Recommend three-month follow-up to assess stability. Increased size of 1 cm right retrocrural lymph node, compatible with metastasis based on prior PET.  4/23/24 - saw Dr. Villatoro ([facility]) in follow up.  PSA now 3.13 ng/mL. PSMA PET ordered. He had a liquid biopsy performed revealing a BRCA2 mutation. Explained the meaning of this. He does not have any children, but he has a sister, and for this reason he should have germline testing performed. He says that his mother had breast cancer in the past. A referral was made to the genetic counselor. If treatment is to begin, he would be started on abiraterone and prednisone along with Olaparib as well.   5/9/24 - PET Scan: 1. No scan evidence of recurrent disease in prostate bed. 2. A PSMA-positive right retrocrural lymph node is increased in size and avidity, compatible with metastasis. The intensity of radiotracer activity suggests high PSMA expression. 3. New tiny focus of increased radiotracer activity in the lateral aspect left seventh rib, without corresponding abnormality on CT, is indeterminate, possibly early bone metastasis (SUV 5.0; image 167). Correlate clinically for history of trauma. 4. Tubular branching opacity in right middle lobe, not significantly changed, compatible with mucoid impaction. Previously noted nonavid left lower lobe opacity is not well seen. Consider 6-month follow-up with dedicated CT of chest.  5/14/24 - saw Dr. Villatoro ([facility]) in follow up.  Continues on Lupron monotherapy. Had repeat PSMA PET.  Communicated with radiation oncology about the feasibility if SBRT to the one clear site of disease. occ minor pain in the right groin area. As only one site of disease and if he has good control via SBRT, he may not require a change in his systemic therapy at this time based on what his PSA does subsequent to SBRT. Referral to radiation oncology made.   5/31/24 - presents in consultation to discuss radiation therapy options.  Mr. Tukr is feeling well today, has no complaints.  Denies any pain.  No urinary or bowel complaints.  IPSS 1 / QOL 0 / EPIC-CP 1 (ED portion not completed) He looks forward to next steps in his treatment.  He states he will be going away the end of June into July, Dr. Villatoro ([facility]) aware.

## 2024-05-31 NOTE — REVIEW OF SYSTEMS
Statement Selected [Patient Intake Form Reviewed] : Patient intake form was reviewed [Loss of Hearing] : loss of hearing [Nocturia] : nocturia [IPSS Score (0-40): ___] : IPSS score: [unfilled] [EPIC-CP Score (0-60): ___] : EPIC-CP score: [unfilled] [Negative] : Heme/Lymph [Constipation: Grade 0] : Constipation: Grade 0 [Diarrhea: Grade 0] : Diarrhea: Grade 0 [Hematuria: Grade 0] : Hematuria: Grade 0 [Urinary Incontinence: Grade 0] : Urinary Incontinence: Grade 0  [Urinary Retention: Grade 0] : Urinary Retention: Grade 0 [Urinary Tract Pain: Grade 0] : Urinary Tract Pain: Grade 0 [Urinary Urgency: Grade 0] : Urinary Urgency: Grade 0 [Urinary Frequency: Grade 0] : Urinary Frequency: Grade 0 [FreeTextEntry4] : Deaf since childhood  [FreeTextEntry8] : QOL 0, nocturia 1x  [FreeTextEntry9] : nocturia 1x

## 2024-06-03 ENCOUNTER — OUTPATIENT (OUTPATIENT)
Dept: OUTPATIENT SERVICES | Facility: HOSPITAL | Age: 69
LOS: 1 days | Discharge: HOME | End: 2024-06-03
Payer: MEDICARE

## 2024-06-03 ENCOUNTER — APPOINTMENT (OUTPATIENT)
Dept: OTOLARYNGOLOGY | Facility: CLINIC | Age: 69
End: 2024-06-03
Payer: MEDICARE

## 2024-06-03 ENCOUNTER — NON-APPOINTMENT (OUTPATIENT)
Age: 69
End: 2024-06-03

## 2024-06-03 VITALS
DIASTOLIC BLOOD PRESSURE: 69 MMHG | WEIGHT: 210 LBS | BODY MASS INDEX: 29.73 KG/M2 | HEART RATE: 58 BPM | SYSTOLIC BLOOD PRESSURE: 113 MMHG | HEIGHT: 70.5 IN

## 2024-06-03 DIAGNOSIS — H90.3 SENSORINEURAL HEARING LOSS, BILATERAL: ICD-10-CM

## 2024-06-03 DIAGNOSIS — K11.7 DISTURBANCES OF SALIVARY SECRETION: ICD-10-CM

## 2024-06-03 DIAGNOSIS — C61 MALIGNANT NEOPLASM OF PROSTATE: ICD-10-CM

## 2024-06-03 DIAGNOSIS — J34.2 DEVIATED NASAL SEPTUM: ICD-10-CM

## 2024-06-03 DIAGNOSIS — H91.3 DEAF NONSPEAKING, NOT ELSEWHERE CLASSIFIED: ICD-10-CM

## 2024-06-03 DIAGNOSIS — K21.9 GASTRO-ESOPHAGEAL REFLUX DISEASE W/OUT ESOPHAGITIS: ICD-10-CM

## 2024-06-03 DIAGNOSIS — K40.90 UNILATERAL INGUINAL HERNIA, WITHOUT OBSTRUCTION OR GANGRENE, NOT SPECIFIED AS RECURRENT: Chronic | ICD-10-CM

## 2024-06-03 PROCEDURE — 31231 NASAL ENDOSCOPY DX: CPT

## 2024-06-03 PROCEDURE — 99204 OFFICE O/P NEW MOD 45 MIN: CPT | Mod: 25

## 2024-06-03 PROCEDURE — 92557 COMPREHENSIVE HEARING TEST: CPT

## 2024-06-03 PROCEDURE — 92567 TYMPANOMETRY: CPT

## 2024-06-03 NOTE — PROCEDURE
[FreeTextEntry6] : Procedure performed: Nasal Endoscopy- Diagnostic Pre-op indication(s): nasal congestion Post-op indication(s): nasal congestion  Verbal and/or written consent obtained from patient Anterior rhinoscopy insufficient to account for symptoms Scope #: 3,  flexible fiber optic telescope  The scope was introduced in the nasal passage between the middle and inferior turbinates to exam the inferior portion of the middle meatus and the fontanelle, as well as the maxillary ostia.  Next, the scope was passed medically and posteriorly to the middle turbinates to examine the sphenoethmoid recess and the superior turbinate region. Upon visualization the finders are as follows: Nasal Septum: R DNS Bilateral - Mucosa: boggy turbinates, Mucous: scant, Polyp: not seen, Inferior Turbinate: boggy, Middle Turbinate: normal, Superior Turbinate: normal, Inferior Meatus: narrow, Middle Meatus: narrow, Super Meatus:normal, Sphenoethmoidal Recess: clear [de-identified] : Procedure performed: laryngeal Endoscopy- Diagnostic Pre-op/post op indication: dysphonia Verbal and/or written consent obtained from patient, Patient was unable to cooperate with mirror Scope #: 3, flexible fiber optic telescope used  Scope was introduced through the nose passed on the floor of the nose to the nasopharynx and then followed down the soft palate to the lower pharynx. The tongue Base, Larynx, Hypopharynx were examined. Base of tongue was symmetric, vallecular was clear, epiglottis was not deformed, subglottis/ pyriform and posterior pharyngeal walls were clear. No erythema, edema, pooling of secretions, masses or lesions. Airway patent, no foreign body visualized. Postcricoid area with moderate erythema and mild edema. + intra-artenoid bar. No pooling of secretions. True vocal cords, vestibular folds, ventricles, pyriform sinuses, and aryepiglottic folds appear normal bilaterally. Vocal cords mobile with good contact b/l. redundant tissue

## 2024-06-03 NOTE — ASSESSMENT
[FreeTextEntry1] : 68 year old male presents for evaluation of his ears, R ear completely deaf, about 50% in left.  Benign ear exam.  - audio performed and reviewed today - reveiwed with pt Discussed with patient and family options for treatment of hearing loss. Discussed how it is functionally limiting their ADL's and social enjoyment and engagement. At this junction they would like to consider hearing appliance to aid in hearing and improve function. Placed a referral to audiology for a hearing aid evaluation and education on options.   Also with dry throat. On exam, erythema and edema consistent with acid reflux, some redundant tissue.  - will proceed to start lifestyle regiment to reduce overproduction of acid and reduce laryngeal reflux including avoiding caffein, alcohol, eating before bed, spicy and fatty foods, and head elevation at night etc. Handout detailing regiment also given  Also presents for evaluation of his nose, hx of sinus surgery 30 years ago. On exam, R DNS. No polyps seen on scope. sinuses clear

## 2024-06-03 NOTE — CONSULT LETTER
[FreeTextEntry1] : Dear Dr. ALINA BERMUDEZ  I had the pleasure of evaluating your patient MARY BROWN, thank you for allowing us to participate in their care. please see full note detailing our visit below. If you have any questions, please do not hesitate to call me and I would be happy to discuss further.   Rafael Salter M.D. Attending Physician,   Department of Otolaryngology - Head and Neck Surgery [facility]  Office: [phone] Fax: [phone] '

## 2024-06-03 NOTE — HISTORY OF PRESENT ILLNESS
[de-identified] : 68 year old male presents for evaluation of ears, nose and throat, utilized . States his mouth and throat are frequently dry. Denies difficulty swallowing. States dryness is worse in the morning.   States 30-40 years ago got surgery for his sinuses, since then doing well. States has a lymph node near colon which reacted when got a PET scan for prostate cancer, will be getting radiation for lymph node. 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 Patient states he was born normal and got sick, states thinks abx at that time made him loose hearing. then went deaf and lost voice. has not noticed recent changes. no Vertigo, tinnitus, pain, drainage or facial weakness.

## 2024-06-03 NOTE — END OF VISIT
[FreeTextEntry3] : I personally saw and examined  the patient in detail.  I spoke to PRASANNA Ayala regarding the assessment and plan of care. I performed the procedures and relevant physical exam.  I have reviewed the above assessment and plan of care and I agree.  I have made changes to the body of the note wherever necessary and appropriate

## 2024-06-09 ENCOUNTER — NON-APPOINTMENT (OUTPATIENT)
Age: 69
End: 2024-06-09

## 2024-06-10 ENCOUNTER — NON-APPOINTMENT (OUTPATIENT)
Age: 69
End: 2024-06-10

## 2024-06-10 PROCEDURE — 77263 THER RADIOLOGY TX PLNG CPLX: CPT

## 2024-06-10 PROCEDURE — 77290 THER RAD SIMULAJ FIELD CPLX: CPT | Mod: 26

## 2024-06-10 PROCEDURE — 77334 RADIATION TREATMENT AID(S): CPT | Mod: 26

## 2024-06-18 ENCOUNTER — NON-APPOINTMENT (OUTPATIENT)
Age: 69
End: 2024-06-18

## 2024-06-19 PROCEDURE — 77300 RADIATION THERAPY DOSE PLAN: CPT | Mod: 26

## 2024-06-19 PROCEDURE — 77334 RADIATION TREATMENT AID(S): CPT | Mod: 26

## 2024-06-19 PROCEDURE — 77295 3-D RADIOTHERAPY PLAN: CPT | Mod: 26

## 2024-06-21 ENCOUNTER — TRANSCRIPTION ENCOUNTER (OUTPATIENT)
Age: 69
End: 2024-06-21

## 2024-06-25 RX ORDER — VALSARTAN AND HYDROCHLOROTHIAZIDE 160; 12.5 MG/1; MG/1
160-12.5 TABLET, FILM COATED ORAL
Qty: 90 | Refills: 2 | Status: ACTIVE | COMMUNITY
Start: 2023-07-31 | End: 1900-01-01

## 2024-07-15 ENCOUNTER — NON-APPOINTMENT (OUTPATIENT)
Age: 69
End: 2024-07-15

## 2024-07-15 VITALS
BODY MASS INDEX: 31.37 KG/M2 | WEIGHT: 219.14 LBS | HEART RATE: 47 BPM | TEMPERATURE: 96.98 F | RESPIRATION RATE: 16 BRPM | DIASTOLIC BLOOD PRESSURE: 93 MMHG | SYSTOLIC BLOOD PRESSURE: 168 MMHG | HEIGHT: 70 IN | OXYGEN SATURATION: 98 %

## 2024-07-18 ENCOUNTER — OUTPATIENT (OUTPATIENT)
Dept: OUTPATIENT SERVICES | Facility: HOSPITAL | Age: 69
LOS: 1 days | Discharge: HOME | End: 2024-07-18

## 2024-07-18 DIAGNOSIS — C61 MALIGNANT NEOPLASM OF PROSTATE: ICD-10-CM

## 2024-07-18 DIAGNOSIS — K40.90 UNILATERAL INGUINAL HERNIA, WITHOUT OBSTRUCTION OR GANGRENE, NOT SPECIFIED AS RECURRENT: Chronic | ICD-10-CM

## 2024-07-19 PROCEDURE — 77435 SBRT MANAGEMENT: CPT

## 2024-07-23 ENCOUNTER — APPOINTMENT (OUTPATIENT)
Dept: HEMATOLOGY ONCOLOGY | Facility: CLINIC | Age: 69
End: 2024-07-23
Payer: MEDICARE

## 2024-07-23 ENCOUNTER — RESULT REVIEW (OUTPATIENT)
Age: 69
End: 2024-07-23

## 2024-07-23 ENCOUNTER — APPOINTMENT (OUTPATIENT)
Dept: INFUSION THERAPY | Facility: HOSPITAL | Age: 69
End: 2024-07-23

## 2024-07-23 VITALS
OXYGEN SATURATION: 96 % | HEART RATE: 61 BPM | RESPIRATION RATE: 16 BRPM | WEIGHT: 212.97 LBS | BODY MASS INDEX: 30.56 KG/M2 | TEMPERATURE: 206.96 F | SYSTOLIC BLOOD PRESSURE: 131 MMHG | DIASTOLIC BLOOD PRESSURE: 84 MMHG

## 2024-07-23 DIAGNOSIS — Z15.89 MALIGNANT NEOPLASM OF PROSTATE: ICD-10-CM

## 2024-07-23 DIAGNOSIS — C61 MALIGNANT NEOPLASM OF PROSTATE: ICD-10-CM

## 2024-07-23 DIAGNOSIS — Z15.01 MALIGNANT NEOPLASM OF PROSTATE: ICD-10-CM

## 2024-07-23 DIAGNOSIS — R59.0 LOCALIZED ENLARGED LYMPH NODES: ICD-10-CM

## 2024-07-23 DIAGNOSIS — Z15.09 MALIGNANT NEOPLASM OF PROSTATE: ICD-10-CM

## 2024-07-23 DIAGNOSIS — Z79.818 LONG TERM (CURRENT) USE OF OTHER AGENTS AFFECTING ESTROGEN RECEPTORS AND ESTROGEN LEVELS: ICD-10-CM

## 2024-07-23 LAB
BASOPHILS # BLD AUTO: 0.02 K/UL — SIGNIFICANT CHANGE UP (ref 0–0.2)
BASOPHILS NFR BLD AUTO: 0.4 % — SIGNIFICANT CHANGE UP (ref 0–2)
EOSINOPHIL # BLD AUTO: 0.07 K/UL — SIGNIFICANT CHANGE UP (ref 0–0.5)
EOSINOPHIL NFR BLD AUTO: 1.5 % — SIGNIFICANT CHANGE UP (ref 0–6)
HCT VFR BLD CALC: 42.1 % — SIGNIFICANT CHANGE UP (ref 39–50)
HGB BLD-MCNC: 14 G/DL — SIGNIFICANT CHANGE UP (ref 13–17)
IMM GRANULOCYTES NFR BLD AUTO: 0.4 % — SIGNIFICANT CHANGE UP (ref 0–0.9)
LYMPHOCYTES # BLD AUTO: 1.19 K/UL — SIGNIFICANT CHANGE UP (ref 1–3.3)
LYMPHOCYTES # BLD AUTO: 25.9 % — SIGNIFICANT CHANGE UP (ref 13–44)
MCHC RBC-ENTMCNC: 31.3 PG — SIGNIFICANT CHANGE UP (ref 27–34)
MCHC RBC-ENTMCNC: 33.3 G/DL — SIGNIFICANT CHANGE UP (ref 32–36)
MCV RBC AUTO: 94.2 FL — SIGNIFICANT CHANGE UP (ref 80–100)
MONOCYTES # BLD AUTO: 0.46 K/UL — SIGNIFICANT CHANGE UP (ref 0–0.9)
MONOCYTES NFR BLD AUTO: 10 % — SIGNIFICANT CHANGE UP (ref 2–14)
NEUTROPHILS # BLD AUTO: 2.83 K/UL — SIGNIFICANT CHANGE UP (ref 1.8–7.4)
NEUTROPHILS NFR BLD AUTO: 61.8 % — SIGNIFICANT CHANGE UP (ref 43–77)
NRBC # BLD: 0 /100 WBCS — SIGNIFICANT CHANGE UP (ref 0–0)
PLATELET # BLD AUTO: 199 K/UL — SIGNIFICANT CHANGE UP (ref 150–400)
RBC # BLD: 4.47 M/UL — SIGNIFICANT CHANGE UP (ref 4.2–5.8)
RBC # FLD: 12.5 % — SIGNIFICANT CHANGE UP (ref 10.3–14.5)
WBC # BLD: 4.59 K/UL — SIGNIFICANT CHANGE UP (ref 3.8–10.5)
WBC # FLD AUTO: 4.59 K/UL — SIGNIFICANT CHANGE UP (ref 3.8–10.5)

## 2024-07-23 PROCEDURE — 99214 OFFICE O/P EST MOD 30 MIN: CPT

## 2024-07-23 PROCEDURE — G2211 COMPLEX E/M VISIT ADD ON: CPT

## 2024-07-24 LAB
ALBUMIN SERPL ELPH-MCNC: 4.3 G/DL
ALP BLD-CCNC: 73 U/L
ALT SERPL-CCNC: 16 U/L
ANION GAP SERPL CALC-SCNC: 13 MMOL/L
AST SERPL-CCNC: 19 U/L
BILIRUB SERPL-MCNC: 0.4 MG/DL
BUN SERPL-MCNC: 25 MG/DL
CALCIUM SERPL-MCNC: 9.4 MG/DL
CHLORIDE SERPL-SCNC: 105 MMOL/L
CO2 SERPL-SCNC: 24 MMOL/L
CREAT SERPL-MCNC: 1.16 MG/DL
EGFR: 68 ML/MIN/1.73M2
GLUCOSE SERPL-MCNC: 101 MG/DL
POTASSIUM SERPL-SCNC: 3.9 MMOL/L
PROT SERPL-MCNC: 7.3 G/DL
PSA SERPL-MCNC: 3.08 NG/ML
SODIUM SERPL-SCNC: 142 MMOL/L

## 2024-07-24 NOTE — PHYSICAL EXAM
[Fully active, able to carry on all pre-disease performance without restriction] : Status 0 - Fully active, able to carry on all pre-disease performance without restriction [Normal] : affect appropriate [de-identified] : no edema noted

## 2024-07-24 NOTE — REVIEW OF SYSTEMS
[Hot Flashes] : hot flashes [Negative] : Gastrointestinal [Fever] : no fever [Chills] : no chills [Fatigue] : no fatigue [Recent Change In Weight] : ~T no recent weight change [Chest Pain] : no chest pain [Palpitations] : no palpitations [Lower Ext Edema] : no lower extremity edema [Cough] : no cough [SOB on Exertion] : no shortness of breath during exertion [Dysuria] : no dysuria [Incontinence] : no incontinence [Joint Pain] : no joint pain [Joint Stiffness] : no joint stiffness [Skin Rash] : no skin rash [Dizziness] : no dizziness [Anxiety] : no anxiety [Depression] : no depression [Muscle Weakness] : no muscle weakness [Easy Bleeding] : no tendency for easy bleeding [Easy Bruising] : no tendency for easy bruising [FreeTextEntry9] : no cramps [de-identified] : No HA, no numbness

## 2024-07-24 NOTE — REASON FOR VISIT
No [Follow-Up Visit] : a follow-up [FreeTextEntry2] : prostate cancer... met castrate resistant. [Interpreters_FullName] : Yumiko Nicholas [Interpreters_Relationshiptopatient] : Cristi [TWNoteComboBox1] : American Sign Language

## 2024-07-24 NOTE — REVIEW OF SYSTEMS
[Hot Flashes] : hot flashes [Negative] : Gastrointestinal [Fever] : no fever [Chills] : no chills [Fatigue] : no fatigue [Recent Change In Weight] : ~T no recent weight change [Chest Pain] : no chest pain [Palpitations] : no palpitations [Lower Ext Edema] : no lower extremity edema [Cough] : no cough [SOB on Exertion] : no shortness of breath during exertion [Dysuria] : no dysuria [Incontinence] : no incontinence [Joint Pain] : no joint pain [Joint Stiffness] : no joint stiffness [Skin Rash] : no skin rash [Dizziness] : no dizziness [Anxiety] : no anxiety [Depression] : no depression [Muscle Weakness] : no muscle weakness [Easy Bleeding] : no tendency for easy bleeding [Easy Bruising] : no tendency for easy bruising [FreeTextEntry9] : no cramps [de-identified] : No HA, no numbness

## 2024-07-24 NOTE — PHYSICAL EXAM
[Fully active, able to carry on all pre-disease performance without restriction] : Status 0 - Fully active, able to carry on all pre-disease performance without restriction [Normal] : affect appropriate [de-identified] : no edema noted

## 2024-07-24 NOTE — HISTORY OF PRESENT ILLNESS
[Disease: _____________________] : Disease: [unfilled] [T: ___] : T[unfilled] [N: ___] : N[unfilled] [M: ___] : M[unfilled] [AJCC Stage: ____] : AJCC Stage: [unfilled] [de-identified] : Mr. Turk is seen in consultation on February 11, 2020. In 2017, he was found to have a markedly elevated PSA. PSA was 30.8.  He had a history of prior negative prostate biopsy in (2015). He had an MRI performed which revealed clinically significant disease. He underwent a targeted biopsy which revealed [place] 9 in the left mid zone. Tumor was grade group 5 involving 80% of one core. Another biopsy in the same zone was grade group for involving 25% of one core. The left apex showed adenocarcinoma of prostate, grade group 4 involving 95% and 30%, of 2 out of 2 cores. All other cores were negative. CT-guided core biopsy was performed of the right iliac bone, which did not reveal evidence of metastases. He underwent a radical prostatectomy in the latter part of 2017. Adenocarcinoma of the prostate, prognostic grade group for (John 4+4 equals 8) with a tertiary pattern 5 was present. Disease was confined to the gland. One regional lymph node of 3 resected was positive for tumor, measuring 9 mm in size. His PSA was not undetectable in the postoperative setting, and he was referred for radiation therapy. This was administered from January 22 of 2018 until March 16, 2018. The total dose was 7000 cGy. he received a 6 month Lupron injection post op, (12/12/17) planned for 3 years of treatment, but insurance changes and apparently denied.  On January 7, 2020, he had a PSA of 2.64. He was referred for a PET scan, and referred for medical oncology consultation. PSA values that are available revealed 24.42 on March 28, 2017. On June 19, 2017 it was 28.86 in August 15, 2017 it was 30.84. Postoperatively, the PSA was 1.69 on December 5, 2017 and 1.06 on September 9, 2019. It was 1.53 on October 1, 2019 and 2.64 on January 7, 2020. He had a PET CT done, revealed no evidence of mets. Feels well, no pains noted. Urine flow is good, has incontinence. No blood, no dysuria. Nocturia x 2. Wears a pad during the day. He has been recommended a Cunningham  clamp.   5/19/20...Feels fine at this time, last seen 3 months ago. No pains. Urine flow is good, better sitting compared with standing. He has urgency, no incontinence. No blood, no dysuria. Nocturia 2-3 times, senses some frequency during the day. No back pains, no weakness. No edema of the legs. Appetite is normal, weight is stable. No fevers, nom chills, no recent infections. No fatigue. No cough, no GRAFF.   8/18/20...Feels well. No pains. No hot flushes. No fatigue. Urine flow is good. has incontinence with change in positions. wears a pad. No blood, no dysuria. Nocturia x 1-3 variable at times. he feels that if he eats protein for dinner he gets up more. No cough, No GRAFF, No edema. Appetite has been good, weight stable. No coronavirus infection. Lives with a roommate. Would like to have a COVID antibody test.   12/15/20...Due Shayla today. , live. He was seen by Dr Metz via video link. I believe this as for an artificial sphincter discussion, but he does not have good understanding. No pains. Appetite is good, no weight loss. No hot flushes. No fatigue. Urine stream is weak and urine sprays, has to sit to void. Nocturia  occasionally. Wears a pad. NO blood, no dysuria. No edema. No pains, no cough, no dyspnea. No fevers, no chills. Independent in ADls.   3/10/21 - radical prostatectomy followed by RT for [place] 9 prostate cancer, 2017. he had an artificial sphincter revision in January. Flow is better, with full control at this time. Nocturia is less, now at 2-3, prior 5. No dysuria. no blood. Appetite is good, no weight loss. Weight is up 3 pounds form last visit. No coronavirus vaccine as yet, unable to get an appt. No edema.no cough, no GRAFF. No fevers, no chills. No pains in the bones. No hot flushes. getting his Eligard today. No N/V/D/C. had colonoscopy and EGD, no issues.   6/9/21 - Eligard today. Feels well. seeing cardiologist next week for a check up, no issues. Has some knee arthritis, mostly when he is seated, not an issue with walking. Present gradually since 2013. NO meds used. Had CV vaccine. No hot flushes from the Eligard. No edema. no chest pains/palpitations. no chest pressure. Appetite is good, weight is stable. Urine flow is good, Has a new artificial sphincter, with no issues at all. No dysuria, no blood. No incontinence. Nocturia variable.   9/28/21 - went to derm as hr had some lesions removed. he then had cryo of the lesions./ Feels well overall. NO pains. Urine flow is improved when he sits, not strong when standing. Nocturia x 2, no incontinence. . No blood, no dysuria. No hot flushes, no significant fatigue. Appetite is good. No edema . No cough, no GRAFF. No chest pains/pressure. Derm note reviewed, they were SKs.    2/2/22, rescheduled from 12/28/21...Using [place] video . "I'm fine". No pains. No fatigue. No hot flushes. Appetite is good, weight is stable. Urine flow is good, no incontinence unless with lifting. Nocturia x 1-2, improved. No blood, no dysuria. No edema. No chest pain/pressure. No cough, no GRAFF. No fevers, no chills. He did have a fever last month and cancelled his appt one month ago. Had his Covid booster shot. he was not tested, had rhinorrhea and fever with cough. No pains noted.   5/11/22...had shingles in April. Started on atenolol. rash resolved, has residual pain except for an episode of stabbing pain last week for one day. He feels fine. However, he does experience some pain at night in the area, a pinch like discomfort. C/W post herpetic neuralgia. Appetite is good, dropped a few pounds, trying to lose weight. No hot flushes noted. Urine flow is good, sits to void, no incontinence he says, then describes a Cunningham clamp. Nocturia 4-6 times, more since the pain in the right thigh area. feels somewhat different after the shingles, feels he has more pain when sitting and when more active. No cough, no GRAFF. No chest pain/pressure/palpitations. No edema. No pains in the bones. No N/V/D/C.   8/10/22...summary to date: John 9 disease documented in early 2017.  He had a prior negative biopsy.  His PSA was up to 30.  An MRI was performed.  He underwent a radical prostatectomy and this revealed [place] 8 (4+4) with tertiary pattern 5.  1 of 3 lymph nodes resected was positive for tumor with a metastatic deposit of 9 mm in size.  In the postoperative setting, his PSA was detectable and he was referred for radiation therapy.  He was initially started on Lupron in addition to radiation therapy with a plan for 3 years of treatment, but insurance denied that plan and he received at least 1 dose for 6 months.  In January 2020, he had a PSA of 2.64 and he had a PET scan performed.  He remains on Eligard and his PSA has been undetectable since December 15, 2020.  He has not had any recent imaging since the PET scan in January 2020.  2 areas of focal activity was seen, 1 in each inguinal region associated with normal-sized lymph nodes.  This was an [facility] PET/CT..  There was no clear evidence of metastatic disease.  utilized.  He reports that he feels well.  He has no pains.  He is due for Lupron today.  He does not have any hot flushes.  He denies any fatigue.  There were no headaches or dizziness.  His appetite is good and there is no weight loss.  He has no cough or shortness of breath.  There were no GI complaints.  Urinary flow is normal.  Nocturia occurs approximately once per night.  There is no incontinence or urgency.  There is no dysuria or hematuria.  He reports no edema.  There is no chest pain chest pressure or palpitations.  11/9/22 - continues on Lupron monotherapy.  not available for today's visit but pt able to read lips well. Pt states is feeling well. Offers no complaints. Nocturia 1-2. .  He denies any hot flushes.  He denies any fatigue.  There were no headaches or dizziness.  His appetite is good and there is no weight loss.  He has no cough or shortness of breath.  There were no GI complaints.  Urinary flow is normal.  Nocturia occurs approximately once per night.  There is no incontinence or urgency.  There is no dysuria or hematuria.  He reports no edema.  There is no chest pain chest pressure or palpitations. No new medications or changes. States he has started traveling and has been enjoying visiting family.  2/1/23 - continues on Lupron monotherapy. [place] 8, bone mets. PSA undetectable since December 2020. No recent imaging, last DEXA August 2022, normal. Overall feeling "good". Notes intermittent dizziness and vertigo approx twice a year when he feels "everything is moving" and very off balance, he has to close his eyes and lie down, it resolves after a day or so. urine flow is normal, nocturia x 2. No incontinence, NO blood, no dysuria. No hot flushes, no fatigue. Appetite is good, no weight loss. No headaches. No paresthesias. No N/V/D./C. No edema. No chest pain/pressure/palpitations.  Has some acid reflux at times. NO fatigue  4/25/23 remains on Lupron monotherapy. [place] 8, bone mets. PSA undetectable since December 2020. No recent imaging, last DEXA  August 2022, normal.  Lupron today.  feels well, feet are much better, he was having pains i his ankles, resolved. saw a podiatrist, had some injections. No gout. pains resolved with the shots. Appetite is good, no weight loss. Hot flushes do not occur. No fatigue. No headaches, no dizziness. NO chest pain/pressure/palpitations.  No cough, No GRAFF. Urine flow is normal, nocturia x 1. No blood, no dysuria. NO incontinence, NO urgency. No N/V/D/C. No health issues since last visit. Has artificial sphincter seen in follow up by Dr Metz recently, scoped and all was well, note reviewed.   7/18/23 ..... on Lupron monotherapy. John 8, bone mets. PSA undetectable since December 2020 until recently. . No recent imaging, last DEXA  August 2022, normal.  Lupron today. Feels "fine", no pains noted. urine flow is good. Nocturia x 1-2. NO blood, no dysuria, no urgency, no incontinence. No edema. No headaches, no dizziness, no balance issues.  No cough, no GRAFF. No chest pain/pressure/palpitations. Appetite is good, weight stable. No hot flushes.   10/17/23 - continues on Lupron monotherapy. John 8, bone mets. Overall feeling well. 3 weeks ago he had a cyst removed from his back and last week he had another cyst removed. Appetite is good. Urine flow is "normal", nocturia 0-2x/night. Denies fever, chills, night sweats, hot flashes, headache, dizziness, balance issues, chest pain, palpitations, SOB, cough, nausea/vomiting, diarrhea/constipation, abdominal pain, dysuria, hematuria, urgency, incontinence, LE edema, bleeding, muscle or joint pain/weakness.  1/22/24... continues on Lupron monotherapy. John 8, bone mets. Feels well, no pains. Appetite is good, no weight loss. No cough, no GRAFF. No chest pain/pressure/palpitatiosn. No edema. NO HA, no dizziness. Urine flow is normal, nocturia x 0-2. No urgency, no incontinence. No blood, no dysuria. No fatigue. Occ hot flushes, mostly in the AM.   4/23/24... continues on Lupron monotherapy. John 8, no clear bone mets. Small LNs noted. Feels fine. Rare hot flushes, usually in the AM, brief. NO fatigue noted. NO pains. Appetite is good, changed diet. Weight stable. Urine flow is "normal', nocturia x 0-2. No incontinence, no blood, no dysuria. NO edema, no chest pains, pressure/palpitations. No N/V/D/C.   5/14/24... continues on Lupron monotherapy. [place] 8, no clear bone mets. Had repeat PSMA PET, called in to discuss the results. Communicated with Dr Lizz Tse about the feasibility if SBRT to the one clear site of disease. occ minor pain in the right groin area. Not daily, resolves after he empties his bladder.  here to discuss result of PSMA PET with . No other changes since last visit.  [de-identified] : John 4+4, tertiary pattern 5 at prostatectomy [de-identified] : [facility] liquid biopsy performed January 2024 revealed BRCA2 [id number] [de-identified] : 7/23/24... continues on Lupron monotherapy. John 8, no clear bone mets. On RT to oligometastatic prostate cancer with retrocrural LN. Had SBRT to the retro-crural node, only site of disease. 3 fractions, minor fatigue noted, improving. Appetite is good, No N/V/D/C. Urine flow is normal, nocturia x 0-1, no urgency, no incontinence. No recent hot flushes. Feels a lot better overall. Had gone to [facility] for a week on vacation and to [place] as well. The heat made the hot flushes worse. No edema except with very hot weather. NO chest pain/pressure. No cough, no GRAFF. No HA, no dizziness, no numbness.

## 2024-07-24 NOTE — PHYSICAL EXAM
[Fully active, able to carry on all pre-disease performance without restriction] : Status 0 - Fully active, able to carry on all pre-disease performance without restriction [Normal] : affect appropriate [de-identified] : no edema noted

## 2024-07-24 NOTE — ASSESSMENT
[Palliative Care Plan] : not applicable at this time [FreeTextEntry1] : Isaac is seen in follow-up today.  He had John 8 disease with no evidence of bone metastases.  He is receiving radiation therapy for oligometastatic prostate cancer with a retrocrural lymph node.  It was his only site of disease on a PSMA PET scan.  He had 3 fractions.  He notes some minor fatigue which is improving.  His appetite is good.  There were no GI complaints.  Urinary flow is normal.  Nocturia occurs 0-1 time.  There is no urgency or incontinence.  He has not noted any recent hot flushes.  He feels "better" overall.  He had gone to [facility] for a week on vacation and also the [place] as well.  He said that the heat made his hot flushes worse he denies any respiratory complaints.  On physical examination, he appears well.  His performance status is 0.  His blood pressure was 131/84.  His weight was 96.6 kg.  His oxygen saturation was 96%.  There is no palpable adenopathy present.  The lungs are clear.  The heart examination was normal.  There is no palpable abnormality upon examination of the abdomen.  There is no spinal column or chest wall tenderness to palpation or percussion.  No edema was noted.  The CBC revealed a white blood cell count of 4.6 with a hemoglobin value of 14 g and a platelet count of 199,000.  The differential was normal.  The PSA was 3.08.  It was 3.13 in April.  The chemistry panel was essentially normal.  He recently completed the radiation therapy, and I did not expect him to have a significant drop in his PSA as yet.  In fact his PSA may have increased provide prior to his radiation.  He will be seen once again in 3 months time for his next injection.  We can hold off on giving him any additional medication at this time.  All questions were answered to the best of my ability and to his apparent satisfaction.

## 2024-07-24 NOTE — ASSESSMENT
[Palliative Care Plan] : not applicable at this time [FreeTextEntry1] : Isaac is seen in follow-up today.  He had John 8 disease with no evidence of bone metastases.  He is receiving radiation therapy for oligometastatic prostate cancer with a retrocrural lymph node.  It was his only site of disease on a PSMA PET scan.  He had 3 fractions.  He notes some minor fatigue which is improving.  His appetite is good.  There were no GI complaints.  Urinary flow is normal.  Nocturia occurs 0-1 time.  There is no urgency or incontinence.  He has not noted any recent hot flushes.  He feels "better" overall.  He had gone to [facility] for a week on vacation and also the [place] as well.  He said that the heat made his hot flushes worse he denies any respiratory complaints.  On physical examination, he appears well.  His performance status is 0.  His blood pressure was 131/84.  His weight was 96.6 kg.  His oxygen saturation was 96%.  There is no palpable adenopathy present.  The lungs are clear.  The heart examination was normal.  There is no palpable abnormality upon examination of the abdomen.  There is no spinal column or chest wall tenderness to palpation or percussion.  No edema was noted.  The CBC revealed a white blood cell count of 4.6 with a hemoglobin value of 14 g and a platelet count of 199,000.  The differential was normal.  The PSA was 3.08.  It was 3.13 in April.  The chemistry panel was essentially normal.  He recently completed the radiation therapy, and I did not expect him to have a significant drop in his PSA as yet.  In fact his PSA may have increased provide prior to his radiation.  He will be seen once again in 3 months time for his next injection.  We can hold off on giving him any additional medication at this time.  All questions were answered to the best of my ability and to his apparent satisfaction.

## 2024-07-24 NOTE — REASON FOR VISIT
[Follow-Up Visit] : a follow-up [FreeTextEntry2] : prostate cancer... met castrate resistant. [Interpreters_FullName] : Yumiko Nicholas [Interpreters_Relationshiptopatient] : Cristi [TWNoteComboBox1] : American Sign Language

## 2024-07-24 NOTE — HISTORY OF PRESENT ILLNESS
[Disease: _____________________] : Disease: [unfilled] [T: ___] : T[unfilled] [N: ___] : N[unfilled] [M: ___] : M[unfilled] [AJCC Stage: ____] : AJCC Stage: [unfilled] [de-identified] : Mr. Tukr is seen in consultation on February 11, 2020. In 2017, he was found to have a markedly elevated PSA. PSA was 30.8.  He had a history of prior negative prostate biopsy in (2015). He had an MRI performed which revealed clinically significant disease. 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 Tumor was grade group 5 involving 80% of one core. Another biopsy in the same zone was grade group for involving 25% of one core. The left apex showed adenocarcinoma of prostate, grade group 4 involving 95% and 30%, of 2 out of 2 cores. All other cores were negative. CT-guided core biopsy was performed of the right iliac bone, which did not reveal evidence of metastases. He underwent a radical prostatectomy in the latter part of 2017. Adenocarcinoma of the prostate, prognostic grade group for (John 4+4 equals 8) with a tertiary pattern 5 was present. Disease was confined to the gland. One regional lymph node of 3 resected was positive for tumor, measuring 9 mm in size. His PSA was not undetectable in the postoperative setting, and he was referred for radiation therapy. This was administered from January 22 of 2018 until March 16, 2018. The total dose was 7000 cGy. he received a 6 month Lupron injection post op, (12/12/17) planned for 3 years of treatment, but insurance changes and apparently denied.  On January 7, 2020, he had a PSA of 2.64. He was referred for a PET scan, and referred for medical oncology consultation. PSA values that are available revealed 24.42 on March 28, 2017. On June 19, 2017 it was 28.86 in August 15, 2017 it was 30.84. Postoperatively, the PSA was 1.69 on December 5, 2017 and 1.06 on September 9, 2019. It was 1.53 on October 1, 2019 and 2.64 on January 7, 2020. He had a PET CT done, revealed no evidence of mets. Feels well, no pains noted. Urine flow is good, has incontinence. No blood, no dysuria. Nocturia x 2. Wears a pad during the day. He has been recommended a Cunningham  clamp.   5/19/20...Feels fine at this time, last seen 3 months ago. No pains. Urine flow is good, better sitting compared with standing. He has urgency, no incontinence. No blood, no dysuria. Nocturia 2-3 times, senses some frequency during the day. No back pains, no weakness. No edema of the legs. Appetite is normal, weight is stable. No fevers, nom chills, no recent infections. No fatigue. No cough, no GRAFF.   8/18/20...Feels well. No pains. No hot flushes. No fatigue. Urine flow is good. has incontinence with change in positions. wears a pad. No blood, no dysuria. Nocturia x 1-3 variable at times. he feels that if he eats protein for dinner he gets up more. No cough, No GRAFF, No edema. Appetite has been good, weight stable. No coronavirus infection. Lives with a roommate. Would like to have a COVID antibody test.   12/15/20...Due Shayla today. , live. He was seen by Dr Metz via video link. I believe this as for an artificial sphincter discussion, but he does not have good understanding. No pains. Appetite is good, no weight loss. No hot flushes. No fatigue. Urine stream is weak and urine sprays, has to sit to void. Nocturia  occasionally. Wears a pad. NO blood, no dysuria. No edema. No pains, no cough, no dyspnea. No fevers, no chills. Independent in ADls.   3/10/21 - radical prostatectomy followed by RT for Cromwell 9 prostate cancer, 2017. he had an artificial sphincter revision in January. Flow is better, with full control at this time. Nocturia is less, now at 2-3, prior 5. No dysuria. no blood. Appetite is good, no weight loss. Weight is up 3 pounds form last visit. No coronavirus vaccine as yet, unable to get an appt. No edema.no cough, no GRAFF. No fevers, no chills. No pains in the bones. No hot flushes. getting his Eligard today. No N/V/D/C. had colonoscopy and EGD, no issues.   6/9/21 - Eligard today. Feels well. seeing cardiologist next week for a check up, no issues. Has some knee arthritis, mostly when he is seated, not an issue with walking. Present gradually since 2013. NO meds used. Had CV vaccine. No hot flushes from the Eligard. No edema. no chest pains/palpitations. no chest pressure. Appetite is good, weight is stable. Urine flow is good, Has a new artificial sphincter, with no issues at all. No dysuria, no blood. No incontinence. Nocturia variable.   9/28/21 - went to derm as hr had some lesions removed. he then had cryo of the lesions./ Feels well overall. NO pains. Urine flow is improved when he sits, not strong when standing. Nocturia x 2, no incontinence. . No blood, no dysuria. No hot flushes, no significant fatigue. Appetite is good. No edema . No cough, no GRAFF. No chest pains/pressure. Derm note reviewed, they were SKs.    2/2/22, rescheduled from 12/28/21...Using [place] video . "I'm fine". No pains. No fatigue. No hot flushes. Appetite is good, weight is stable. Urine flow is good, no incontinence unless with lifting. Nocturia x 1-2, improved. No blood, no dysuria. No edema. No chest pain/pressure. No cough, no GRAFF. No fevers, no chills. He did have a fever last month and cancelled his appt one month ago. Had his Covid booster shot. he was not tested, had rhinorrhea and fever with cough. No pains noted.   5/11/22...had shingles in April. Started on atenolol. rash resolved, has residual pain except for an episode of stabbing pain last week for one day. He feels fine. However, he does experience some pain at night in the area, a pinch like discomfort. C/W post herpetic neuralgia. Appetite is good, dropped a few pounds, trying to lose weight. No hot flushes noted. Urine flow is good, sits to void, no incontinence he says, then describes a Cunningham clamp. Nocturia 4-6 times, more since the pain in the right thigh area. feels somewhat different after the shingles, feels he has more pain when sitting and when more active. No cough, no GRAFF. No chest pain/pressure/palpitations. No edema. No pains in the bones. No N/V/D/C.   8/10/22...summary to date: John 9 disease documented in early 2017.  He had a prior negative biopsy.  His PSA was up to 30.  An MRI was performed.  He underwent a radical prostatectomy and this revealed Cromwell 8 (4+4) with tertiary pattern 5.  1 of 3 lymph nodes resected was positive for tumor with a metastatic deposit of 9 mm in size.  In the postoperative setting, his PSA was detectable and he was referred for radiation therapy.  He was initially started on Lupron in addition to radiation therapy with a plan for 3 years of treatment, but insurance denied that plan and he received at least 1 dose for 6 months.  In January 2020, he had a PSA of 2.64 and he had a PET scan performed.  He remains on Eligard and his PSA has been undetectable since December 15, 2020.  He has not had any recent imaging since the PET scan in January 2020.  2 areas of focal activity was seen, 1 in each inguinal region associated with normal-sized lymph nodes.  This was an [facility] PET/CT..  There was no clear evidence of metastatic disease.  utilized.  He reports that he feels well.  He has no pains.  He is due for Lupron today.  He does not have any hot flushes.  He denies any fatigue.  There were no headaches or dizziness.  His appetite is good and there is no weight loss.  He has no cough or shortness of breath.  There were no GI complaints.  Urinary flow is normal.  Nocturia occurs approximately once per night.  There is no incontinence or urgency.  There is no dysuria or hematuria.  He reports no edema.  There is no chest pain chest pressure or palpitations.  11/9/22 - continues on Lupron monotherapy.  not available for today's visit but pt able to read lips well. Pt states is feeling well. Offers no complaints. Nocturia 1-2. .  He denies any hot flushes.  He denies any fatigue.  There were no headaches or dizziness.  His appetite is good and there is no weight loss.  He has no cough or shortness of breath.  There were no GI complaints.  Urinary flow is normal.  Nocturia occurs approximately once per night.  There is no incontinence or urgency.  There is no dysuria or hematuria.  He reports no edema.  There is no chest pain chest pressure or palpitations. No new medications or changes. States he has started traveling and has been enjoying visiting family.  2/1/23 - continues on Lupron monotherapy. Cromwell 8, bone mets. PSA undetectable since December 2020. No recent imaging, last DEXA August 2022, normal. Overall feeling "good". Notes intermittent dizziness and vertigo approx twice a year when he feels "everything is moving" and very off balance, he has to close his eyes and lie down, it resolves after a day or so. urine flow is normal, nocturia x 2. No incontinence, NO blood, no dysuria. No hot flushes, no fatigue. Appetite is good, no weight loss. No headaches. No paresthesias. No N/V/D./C. No edema. No chest pain/pressure/palpitations.  Has some acid reflux at times. NO fatigue  4/25/23 remains on Lupron monotherapy. Cromwell 8, bone mets. PSA undetectable since December 2020. No recent imaging, last DEXA  August 2022, normal.  Lupron today.  feels well, feet are much better, he was having pains i his ankles, resolved. saw a podiatrist, had some injections. No gout. pains resolved with the shots. Appetite is good, no weight loss. Hot flushes do not occur. No fatigue. No headaches, no dizziness. NO chest pain/pressure/palpitations.  No cough, No GRAFF. Urine flow is normal, nocturia x 1. No blood, no dysuria. NO incontinence, NO urgency. No N/V/D/C. No health issues since last visit. Has artificial sphincter seen in follow up by Dr Metz recently, scoped and all was well, note reviewed.   7/18/23 ..... on Lupron monotherapy. John 8, bone mets. PSA undetectable since December 2020 until recently. . No recent imaging, last DEXA  August 2022, normal.  Lupron today. Feels "fine", no pains noted. urine flow is good. Nocturia x 1-2. NO blood, no dysuria, no urgency, no incontinence. No edema. No headaches, no dizziness, no balance issues.  No cough, no GRAFF. No chest pain/pressure/palpitations. Appetite is good, weight stable. No hot flushes.   10/17/23 - continues on Lupron monotherapy. John 8, bone mets. Overall feeling well. 3 weeks ago he had a cyst removed from his back and last week he had another cyst removed. Appetite is good. Urine flow is "normal", nocturia 0-2x/night. Denies fever, chills, night sweats, hot flashes, headache, dizziness, balance issues, chest pain, palpitations, SOB, cough, nausea/vomiting, diarrhea/constipation, abdominal pain, dysuria, hematuria, urgency, incontinence, LE edema, bleeding, muscle or joint pain/weakness.  1/22/24... continues on Lupron monotherapy. John 8, bone mets. Feels well, no pains. Appetite is good, no weight loss. No cough, no GRAFF. No chest pain/pressure/palpitatiosn. No edema. NO HA, no dizziness. Urine flow is normal, nocturia x 0-2. No urgency, no incontinence. No blood, no dysuria. No fatigue. Occ hot flushes, mostly in the AM.   4/23/24... continues on Lupron monotherapy. John 8, no clear bone mets. Small LNs noted. Feels fine. Rare hot flushes, usually in the AM, brief. NO fatigue noted. NO pains. Appetite is good, changed diet. Weight stable. Urine flow is "normal', nocturia x 0-2. No incontinence, no blood, no dysuria. NO edema, no chest pains, pressure/palpitations. No N/V/D/C.   5/14/24... continues on Lupron monotherapy. Cromwell 8, no clear bone mets. Had repeat PSMA PET, called in to discuss the results. Communicated with Dr Lizz Tse about the feasibility if SBRT to the one clear site of disease. occ minor pain in the right groin area. Not daily, resolves after he empties his bladder.  here to discuss result of PSMA PET with . No other changes since last visit.  [de-identified] : John 4+4, tertiary pattern 5 at prostatectomy [de-identified] : [facility] liquid biopsy performed January 2024 revealed BRCA2 [id number] [de-identified] : 7/23/24... continues on Lupron monotherapy. John 8, no clear bone mets. On RT to oligometastatic prostate cancer with retrocrural LN. Had SBRT to the retro-crural node, only site of disease. 3 fractions, minor fatigue noted, improving. Appetite is good, No N/V/D/C. Urine flow is normal, nocturia x 0-1, no urgency, no incontinence. No recent hot flushes. Feels a lot better overall. Had gone to [facility] for a week on vacation and to [place] as well. The heat made the hot flushes worse. No edema except with very hot weather. NO chest pain/pressure. No cough, no GRAFF. No HA, no dizziness, no numbness.

## 2024-07-24 NOTE — ASSESSMENT
[Palliative Care Plan] : not applicable at this time [FreeTextEntry1] : Isaac is seen in follow-up today.  He had John 8 disease with no evidence of bone metastases.  He is receiving radiation therapy for oligometastatic prostate cancer with a retrocrural lymph node.  It was his only site of disease on a PSMA PET scan.  He had 3 fractions.  He notes some minor fatigue which is improving.  His appetite is good.  There were no GI complaints.  Urinary flow is normal.  Nocturia occurs 0-1 time.  There is no urgency or incontinence.  He has not noted any recent hot flushes.  He feels "better" overall.  He had gone to [facility] for a week on vacation and also the [place] as well.  He said that the heat made his hot flushes worse he denies any respiratory complaints.  On physical examination, he appears well.  His performance status is 0.  His blood pressure was 131/84.  His weight was 96.6 kg.  His oxygen saturation was 96%.  There is no palpable adenopathy present.  The lungs are clear.  The heart examination was normal.  There is no palpable abnormality upon examination of the abdomen.  There is no spinal column or chest wall tenderness to palpation or percussion.  No edema was noted.  The CBC revealed a white blood cell count of 4.6 with a hemoglobin value of 14 g and a platelet count of 199,000.  The differential was normal.  The PSA was 3.08.  It was 3.13 in April.  The chemistry panel was essentially normal.  He recently completed the radiation therapy, and I did not expect him to have a significant drop in his PSA as yet.  In fact his PSA may have increased provide prior to his radiation.  He will be seen once again in 3 months time for his next injection.  We can hold off on giving him any additional medication at this time.  All questions were answered to the best of my ability and to his apparent satisfaction.

## 2024-07-24 NOTE — REVIEW OF SYSTEMS
[Hot Flashes] : hot flashes [Negative] : Gastrointestinal [Fever] : no fever [Chills] : no chills [Fatigue] : no fatigue [Recent Change In Weight] : ~T no recent weight change [Chest Pain] : no chest pain [Palpitations] : no palpitations [Lower Ext Edema] : no lower extremity edema [Cough] : no cough [SOB on Exertion] : no shortness of breath during exertion [Dysuria] : no dysuria [Incontinence] : no incontinence [Joint Pain] : no joint pain [Joint Stiffness] : no joint stiffness [Skin Rash] : no skin rash [Dizziness] : no dizziness [Anxiety] : no anxiety [Depression] : no depression [Muscle Weakness] : no muscle weakness [Easy Bleeding] : no tendency for easy bleeding [Easy Bruising] : no tendency for easy bruising [FreeTextEntry9] : no cramps [de-identified] : No HA, no numbness

## 2024-07-24 NOTE — HISTORY OF PRESENT ILLNESS
[Disease: _____________________] : Disease: [unfilled] [T: ___] : T[unfilled] [N: ___] : N[unfilled] [M: ___] : M[unfilled] [AJCC Stage: ____] : AJCC Stage: [unfilled] [de-identified] : Mr. Turk is seen in consultation on February 11, 2020. In 2017, he was found to have a markedly elevated PSA. PSA was 30.8.  He had a history of prior negative prostate biopsy in (2015). He had an MRI performed which revealed clinically significant disease. He underwent a targeted biopsy which revealed [place] 9 in the left mid zone. Tumor was grade group 5 involving 80% of one core. Another biopsy in the same zone was grade group for involving 25% of one core. The left apex showed adenocarcinoma of prostate, grade group 4 involving 95% and 30%, of 2 out of 2 cores. All other cores were negative. CT-guided core biopsy was performed of the right iliac bone, which did not reveal evidence of metastases. He underwent a radical prostatectomy in the latter part of 2017. Adenocarcinoma of the prostate, prognostic grade group for (John 4+4 equals 8) with a tertiary pattern 5 was present. Disease was confined to the gland. One regional lymph node of 3 resected was positive for tumor, measuring 9 mm in size. His PSA was not undetectable in the postoperative setting, and he was referred for radiation therapy. This was administered from January 22 of 2018 until March 16, 2018. The total dose was 7000 cGy. he received a 6 month Lupron injection post op, (12/12/17) planned for 3 years of treatment, but insurance changes and apparently denied.  On January 7, 2020, he had a PSA of 2.64. He was referred for a PET scan, and referred for medical oncology consultation. PSA values that are available revealed 24.42 on March 28, 2017. On June 19, 2017 it was 28.86 in August 15, 2017 it was 30.84. Postoperatively, the PSA was 1.69 on December 5, 2017 and 1.06 on September 9, 2019. It was 1.53 on October 1, 2019 and 2.64 on January 7, 2020. He had a PET CT done, revealed no evidence of mets. Feels well, no pains noted. Urine flow is good, has incontinence. No blood, no dysuria. Nocturia x 2. Wears a pad during the day. He has been recommended a Cunningham  clamp.   5/19/20...Feels fine at this time, last seen 3 months ago. No pains. Urine flow is good, better sitting compared with standing. He has urgency, no incontinence. No blood, no dysuria. Nocturia 2-3 times, senses some frequency during the day. No back pains, no weakness. No edema of the legs. Appetite is normal, weight is stable. No fevers, nom chills, no recent infections. No fatigue. No cough, no GRAFF.   8/18/20...Feels well. No pains. No hot flushes. No fatigue. Urine flow is good. has incontinence with change in positions. wears a pad. No blood, no dysuria. Nocturia x 1-3 variable at times. he feels that if he eats protein for dinner he gets up more. No cough, No GRAFF, No edema. Appetite has been good, weight stable. No coronavirus infection. Lives with a roommate. Would like to have a COVID antibody test.   12/15/20...Due Shayla today. , live. He was seen by Dr Metz via video link. I believe this as for an artificial sphincter discussion, but he does not have good understanding. No pains. Appetite is good, no weight loss. No hot flushes. No fatigue. Urine stream is weak and urine sprays, has to sit to void. Nocturia  occasionally. Wears a pad. NO blood, no dysuria. No edema. No pains, no cough, no dyspnea. No fevers, no chills. Independent in ADls.   3/10/21 - radical prostatectomy followed by RT for Newark 9 prostate cancer, 2017. he had an artificial sphincter revision in January. Flow is better, with full control at this time. Nocturia is less, now at 2-3, prior 5. No dysuria. no blood. Appetite is good, no weight loss. Weight is up 3 pounds form last visit. No coronavirus vaccine as yet, unable to get an appt. No edema.no cough, no GRAFF. No fevers, no chills. No pains in the bones. No hot flushes. getting his Eligard today. No N/V/D/C. had colonoscopy and EGD, no issues.   6/9/21 - Eligard today. Feels well. seeing cardiologist next week for a check up, no issues. Has some knee arthritis, mostly when he is seated, not an issue with walking. Present gradually since 2013. NO meds used. Had CV vaccine. No hot flushes from the Eligard. No edema. no chest pains/palpitations. no chest pressure. Appetite is good, weight is stable. Urine flow is good, Has a new artificial sphincter, with no issues at all. No dysuria, no blood. No incontinence. Nocturia variable.   9/28/21 - went to derm as hr had some lesions removed. he then had cryo of the lesions./ Feels well overall. NO pains. Urine flow is improved when he sits, not strong when standing. Nocturia x 2, no incontinence. . No blood, no dysuria. No hot flushes, no significant fatigue. Appetite is good. No edema . No cough, no GRAFF. No chest pains/pressure. Derm note reviewed, they were SKs.    2/2/22, rescheduled from 12/28/21...Using [place] video . "I'm fine". No pains. No fatigue. No hot flushes. Appetite is good, weight is stable. Urine flow is good, no incontinence unless with lifting. Nocturia x 1-2, improved. No blood, no dysuria. No edema. No chest pain/pressure. No cough, no GRAFF. No fevers, no chills. He did have a fever last month and cancelled his appt one month ago. Had his Covid booster shot. he was not tested, had rhinorrhea and fever with cough. No pains noted.   5/11/22...had shingles in April. Started on atenolol. rash resolved, has residual pain except for an episode of stabbing pain last week for one day. He feels fine. However, he does experience some pain at night in the area, a pinch like discomfort. C/W post herpetic neuralgia. Appetite is good, dropped a few pounds, trying to lose weight. No hot flushes noted. Urine flow is good, sits to void, no incontinence he says, then describes a Cunningham clamp. Nocturia 4-6 times, more since the pain in the right thigh area. feels somewhat different after the shingles, feels he has more pain when sitting and when more active. No cough, no GRAFF. No chest pain/pressure/palpitations. No edema. No pains in the bones. No N/V/D/C.   8/10/22...summary to date: John 9 disease documented in early 2017.  He had a prior negative biopsy.  His PSA was up to 30.  An MRI was performed.  He underwent a radical prostatectomy and this revealed [place] 8 (4+4) with tertiary pattern 5.  1 of 3 lymph nodes resected was positive for tumor with a metastatic deposit of 9 mm in size.  In the postoperative setting, his PSA was detectable and he was referred for radiation therapy.  He was initially started on Lupron in addition to radiation therapy with a plan for 3 years of treatment, but insurance denied that plan and he received at least 1 dose for 6 months.  In January 2020, he had a PSA of 2.64 and he had a PET scan performed.  He remains on Eligard and his PSA has been undetectable since December 15, 2020.  He has not had any recent imaging since the PET scan in January 2020.  2 areas of focal activity was seen, 1 in each inguinal region associated with normal-sized lymph nodes.  This was an Ax[facility] PET/CT..  There was no clear evidence of metastatic disease.  utilized.  He reports that he feels well.  He has no pains.  He is due for Lupron today.  He does not have any hot flushes.  He denies any fatigue.  There were no headaches or dizziness.  His appetite is good and there is no weight loss.  He has no cough or shortness of breath.  There were no GI complaints.  Urinary flow is normal.  Nocturia occurs approximately once per night.  There is no incontinence or urgency.  There is no dysuria or hematuria.  He reports no edema.  There is no chest pain chest pressure or palpitations.  11/9/22 - continues on Lupron monotherapy.  not available for today's visit but pt able to read lips well. Pt states is feeling well. Offers no complaints. Nocturia 1-2. .  He denies any hot flushes.  He denies any fatigue.  There were no headaches or dizziness.  His appetite is good and there is no weight loss.  He has no cough or shortness of breath.  There were no GI complaints.  Urinary flow is normal.  Nocturia occurs approximately once per night.  There is no incontinence or urgency.  There is no dysuria or hematuria.  He reports no edema.  There is no chest pain chest pressure or palpitations. No new medications or changes. States he has started traveling and has been enjoying visiting family.  2/1/23 - continues on Lupron monotherapy. Newark 8, bone mets. PSA undetectable since December 2020. No recent imaging, last DEXA August 2022, normal. Overall feeling "good". Notes intermittent dizziness and vertigo approx twice a year when he feels "everything is moving" and very off balance, he has to close his eyes and lie down, it resolves after a day or so. urine flow is normal, nocturia x 2. No incontinence, NO blood, no dysuria. No hot flushes, no fatigue. Appetite is good, no weight loss. No headaches. No paresthesias. No N/V/D./C. No edema. No chest pain/pressure/palpitations.  Has some acid reflux at times. NO fatigue  4/25/23 remains on Lupron monotherapy. Newark 8, bone mets. PSA undetectable since December 2020. No recent imaging, last DEXA  August 2022, normal.  Lupron today.  feels well, feet are much better, he was having pains i his ankles, resolved. saw a podiatrist, had some injections. No gout. pains resolved with the shots. Appetite is good, no weight loss. Hot flushes do not occur. No fatigue. No headaches, no dizziness. NO chest pain/pressure/palpitations.  No cough, No GRAFF. Urine flow is normal, nocturia x 1. No blood, no dysuria. NO incontinence, NO urgency. No N/V/D/C. No health issues since last visit. Has artificial sphincter seen in follow up by Dr Metz recently, scoped and all was well, note reviewed.   7/18/23 ..... on Lupron monotherapy. John 8, bone mets. PSA undetectable since December 2020 until recently. . No recent imaging, last DEXA  August 2022, normal.  Lupron today. Feels "fine", no pains noted. urine flow is good. Nocturia x 1-2. NO blood, no dysuria, no urgency, no incontinence. No edema. No headaches, no dizziness, no balance issues.  No cough, no GRAFF. No chest pain/pressure/palpitations. Appetite is good, weight stable. No hot flushes.   10/17/23 - continues on Lupron monotherapy. John 8, bone mets. Overall feeling well. 3 weeks ago he had a cyst removed from his back and last week he had another cyst removed. Appetite is good. Urine flow is "normal", nocturia 0-2x/night. Denies fever, chills, night sweats, hot flashes, headache, dizziness, balance issues, chest pain, palpitations, SOB, cough, nausea/vomiting, diarrhea/constipation, abdominal pain, dysuria, hematuria, urgency, incontinence, LE edema, bleeding, muscle or joint pain/weakness.  1/22/24... continues on Lupron monotherapy. John 8, bone mets. Feels well, no pains. Appetite is good, no weight loss. No cough, no GRAFF. No chest pain/pressure/palpitatiosn. No edema. NO HA, no dizziness. Urine flow is normal, nocturia x 0-2. No urgency, no incontinence. No blood, no dysuria. No fatigue. Occ hot flushes, mostly in the AM.   4/23/24... continues on Lupron monotherapy. John 8, no clear bone mets. Small LNs noted. Feels fine. Rare hot flushes, usually in the AM, brief. NO fatigue noted. NO pains. Appetite is good, changed diet. Weight stable. Urine flow is "normal', nocturia x 0-2. No incontinence, no blood, no dysuria. NO edema, no chest pains, pressure/palpitations. No N/V/D/C.   5/14/24... continues on Lupron monotherapy. Newark 8, no clear bone mets. Had repeat PSMA PET, called in to discuss the results. Communicated with Dr Lizz Tse about the feasibility if SBRT to the one clear site of disease. occ minor pain in the right groin area. Not daily, resolves after he empties his bladder.  here to discuss result of PSMA PET with . No other changes since last visit.  [de-identified] : John 4+4, tertiary pattern 5 at prostatectomy [de-identified] : [facility] liquid biopsy performed January 2024 revealed BRCA2 [id number] [de-identified] : 7/23/24... continues on Lupron monotherapy. John 8, no clear bone mets. On RT to oligometastatic prostate cancer with retrocrural LN. Had SBRT to the retro-crural node, only site of disease. 3 fractions, minor fatigue noted, improving. Appetite is good, No N/V/D/C. Urine flow is normal, nocturia x 0-1, no urgency, no incontinence. No recent hot flushes. Feels a lot better overall. Had gone to [facility] for a week on vacation and to [place] as well. The heat made the hot flushes worse. No edema except with very hot weather. NO chest pain/pressure. No cough, no GRAFF. No HA, no dizziness, no numbness.

## 2024-07-26 ENCOUNTER — RX RENEWAL (OUTPATIENT)
Age: 69
End: 2024-07-26

## 2024-07-30 ENCOUNTER — NON-APPOINTMENT (OUTPATIENT)
Age: 69
End: 2024-07-30

## 2024-08-08 ENCOUNTER — APPOINTMENT (OUTPATIENT)
Dept: INTERNAL MEDICINE | Facility: CLINIC | Age: 69
End: 2024-08-08

## 2024-08-08 PROBLEM — R05.9 COUGH: Status: ACTIVE | Noted: 2024-08-08

## 2024-08-08 PROCEDURE — 99214 OFFICE O/P EST MOD 30 MIN: CPT

## 2024-08-08 PROCEDURE — G2211 COMPLEX E/M VISIT ADD ON: CPT

## 2024-08-08 NOTE — PHYSICAL EXAM
[Normal] : no carotid or abdominal bruits heard, no varicosities, pedal pulses are present, no peripheral edema, no extremity clubbing or cyanosis and no palpable aorta [Clear to Auscultation] : lungs were clear to auscultation bilaterally Pt able to ambulate ~200' without AD independently, no LOB noted, and good safety awareness throughout.

## 2024-08-08 NOTE — ADDENDUM
[FreeTextEntry1] : I, Courtney Aguilar, acted as a scribe on behalf of Dr. Obinna Masters MD, on 08/08/2024.   All medical entries made by the scribe were at my, Dr. Obinna Masters MD, direction and personally dictated by me on 08/08/2024. I have reviewed the chart and agree that the record accurately reflects my personal performance of the history, physical exam, assessment and plan. I have also personally directed, reviewed, and agreed with the chart.

## 2024-08-08 NOTE — REVIEW OF SYSTEMS
[Cough] : cough [Negative] : ENT [Fever] : no fever [Wheezing] : no wheezing [FreeTextEntry6] : throat irritation [FreeTextEntry7] : upper GI, chest congestion

## 2024-08-08 NOTE — HISTORY OF PRESENT ILLNESS
[FreeTextEntry1] : Patient presents for a follow-up visit.  [de-identified] : Patient is a 69 yr old male present today for a follow-up visit.  used entire time for visit.   Patient c/o cough with phlegm that feels stuck in chest since 7/30, unsure if allergies. Took Mucinex before bedtime, ineffective, felt too strong of med. Reports sneezing, denies wheezing, fever, and heartburn. Try to blow nose yet mucus is appreciated in throat. Hydrates adequately and drinks tea. Other than persistent cough, feels fine overall, breathing okay. Seen by Dr. Osvaldo Villatoro for prostate cancer. Went for radiation therapy on July 15, 17, 19th on retrocrural lymph node. Seen by Dr. Rafael Salter, for nasal endoscopy given dryness. Compliant with pantoprazole, discussed how reflux may be correlated to mucus in throat. Remains active with walks and puzzles. Patient reports everything is stable. Denies any CP, chest tightness or SOB. Denies any abdominal pain, urinary symptom, or change in bowel habits. Denies any fever, chills, or night sweats.

## 2024-08-08 NOTE — ASSESSMENT
[FreeTextEntry1] : Follow-up Visit: cough, HTN - BP is stable. Continue current management. - Rx fluticasone propionate 50mcg/act nasal suspension, pantoprazole sodium 40mg, renew atenolol 50mg, valsartan- hydrochlorothiazide 160-12.5mg. -Previous CT scan reviewed, mucoid impaction symptoms did precede the mucoid impaction.  Possibly reflux postnasal drip medication induced versus radiation pneumonitis patient has no wheezing on exam will attempt to double pantoprazole and take Flonase and reassess in 1 to 2 weeks.  Advised to follow-up with ENT ENT consult reviewed.  Patient states more throat discomfort. Patient presents for longitudinal chronic care management.  - Discussed pantoprazole 2x/day for one week treatment.    - RTO in 3 months    Pt verbalized understanding and will reach should any questions/concerns occur.

## 2024-08-20 ENCOUNTER — TRANSCRIPTION ENCOUNTER (OUTPATIENT)
Age: 69
End: 2024-08-20

## 2024-08-22 ENCOUNTER — APPOINTMENT (OUTPATIENT)
Dept: OTOLARYNGOLOGY | Facility: CLINIC | Age: 69
End: 2024-08-22
Payer: MEDICARE

## 2024-08-22 VITALS
HEIGHT: 70 IN | DIASTOLIC BLOOD PRESSURE: 81 MMHG | BODY MASS INDEX: 30.06 KG/M2 | SYSTOLIC BLOOD PRESSURE: 140 MMHG | HEART RATE: 62 BPM | WEIGHT: 210 LBS | TEMPERATURE: 97.7 F

## 2024-08-22 DIAGNOSIS — K21.9 GASTRO-ESOPHAGEAL REFLUX DISEASE W/OUT ESOPHAGITIS: ICD-10-CM

## 2024-08-22 DIAGNOSIS — H90.3 SENSORINEURAL HEARING LOSS, BILATERAL: ICD-10-CM

## 2024-08-22 DIAGNOSIS — H91.3 DEAF NONSPEAKING, NOT ELSEWHERE CLASSIFIED: ICD-10-CM

## 2024-08-22 DIAGNOSIS — R09.82 POSTNASAL DRIP: ICD-10-CM

## 2024-08-22 DIAGNOSIS — J34.2 DEVIATED NASAL SEPTUM: ICD-10-CM

## 2024-08-22 PROCEDURE — 31231 NASAL ENDOSCOPY DX: CPT

## 2024-08-22 PROCEDURE — 99214 OFFICE O/P EST MOD 30 MIN: CPT | Mod: 25

## 2024-08-22 RX ORDER — IPRATROPIUM BROMIDE 21 UG/1
0.03 SPRAY NASAL 3 TIMES DAILY
Qty: 1 | Refills: 2 | Status: ACTIVE | COMMUNITY
Start: 2024-08-22 | End: 1900-01-01

## 2024-08-22 RX ORDER — OMEPRAZOLE 40 MG/1
40 CAPSULE, DELAYED RELEASE ORAL TWICE DAILY
Qty: 180 | Refills: 0 | Status: ACTIVE | COMMUNITY
Start: 2024-08-22 | End: 1900-01-01

## 2024-08-22 NOTE — PROCEDURE
[FreeTextEntry6] : Procedure performed: Nasal Endoscopy- Diagnostic Pre-op indication(s): nasal congestion Post-op indication(s): nasal congestion  Verbal and/or written consent obtained from patient Anterior rhinoscopy insufficient to account for symptoms Scope #: 3,  flexible fiber optic telescope  The scope was introduced in the nasal passage between the middle and inferior turbinates to exam the inferior portion of the middle meatus and the fontanelle, as well as the maxillary ostia.  Next, the scope was passed medically and posteriorly to the middle turbinates to examine the sphenoethmoid recess and the superior turbinate region. Upon visualization the finders are as follows: Nasal Septum: R DNS Bilateral - Mucosa: boggy turbinates, Mucous: scant, Polyp: not seen, Inferior Turbinate: boggy, Middle Turbinate: normal, Superior Turbinate: normal, Inferior Meatus: narrow, Middle Meatus: narrow, Super Meatus:normal, Sphenoethmoidal Recess: clear [de-identified] : Procedure performed: laryngeal Endoscopy- Diagnostic Pre-op/post op indication: dysphonia Verbal and/or written consent obtained from patient, Patient was unable to cooperate with mirror Scope #: 3, flexible fiber optic telescope used  Scope was introduced through the nose passed on the floor of the nose to the nasopharynx and then followed down the soft palate to the lower pharynx. The tongue Base, Larynx, Hypopharynx were examined. Base of tongue was symmetric, vallecular was clear, epiglottis was not deformed, subglottis/ pyriform and posterior pharyngeal walls were clear. No erythema, edema, pooling of secretions, masses or lesions. Airway patent, no foreign body visualized. Postcricoid area with moderate erythema and mild edema. + intra-artenoid bar. No pooling of secretions. True vocal cords, vestibular folds, ventricles, pyriform sinuses, and aryepiglottic folds appear normal bilaterally. Vocal cords mobile with good contact b/l. redundant tissue

## 2024-08-22 NOTE — HISTORY OF PRESENT ILLNESS
[de-identified] : 68 year old male presents for evaluation of ears, nose and throat, utilized . States his mouth and throat are frequently dry. Denies difficulty swallowing. States dryness is worse in the morning.   States 30-40 years ago got surgery for his sinuses, since then doing well. States has a lymph node near colon which reacted when got a PET scan for prostate cancer, will be getting radiation for lymph node. 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 Patient states he was born normal and got sick, states thinks abx at that time made him loose hearing. then went deaf and lost voice. has not noticed recent changes. no Vertigo, tinnitus, pain, drainage or facial weakness. [FreeTextEntry1] : patient following up with constant mucus and phlegm in his throat. Has been drinking hot teas to cough up the mucus. After last visit had an intermittent cough and lost his voice for 5 days which has improved but still with sensation of mucus dripping back into his throat.   ears doing ok

## 2024-08-22 NOTE — REASON FOR VISIT
[Subsequent Evaluation] : a subsequent evaluation for [FreeTextEntry2] : ears, nose and throat [Interpreters_IDNumber] : [id number]

## 2024-08-22 NOTE — CONSULT LETTER
[FreeTextEntry1] : Dear Dr. ALINA BERMUDEZ  I had the pleasure of evaluating your patient MARY BROWN, thank you for allowing us to participate in their care. please see full note detailing our visit below. If you have any questions, please do not hesitate to call me and I would be happy to discuss further.   Rafael Salter M.D. Attending Physician,   Department of Otolaryngology - Head and Neck [facility]  Office: [phone] Fax: [phone] '

## 2024-08-22 NOTE — ASSESSMENT
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 Benign ear exam.  - audio performed and reviewed 06/03/ - reviewed with pt Discussed with patient and family options for treatment of hearing loss. Discussed how it is functionally limiting their ADL's and social enjoyment and engagement. At this junction they would like to consider hearing appliance to aid in hearing and improve function. Placed a referral to audiology for a hearing aid evaluation and education on options.  Also with phlegm in his throat. On exam, erythema and edema consistent with acid reflux, some redundant tissue.  - will continue lifestyle regiment to reduce overproduction of acid and reduce laryngeal reflux including avoiding caffein, alcohol, eating before bed, spicy and fatty foods, and head elevation at night etc. Handout detailing regiment also given - PPI BID  Also presents for evaluation of his nose, hx of sinus surgery 30 years ago. On exam, R DNS. No polyps seen on scope. copious but clear secretions  - will do trial of Atrovent nasal spray to use as needed to see if will improve symptoms  follow up in 6 weeks

## 2024-09-16 ENCOUNTER — APPOINTMENT (OUTPATIENT)
Dept: RADIATION ONCOLOGY | Facility: CLINIC | Age: 69
End: 2024-09-16
Payer: MEDICARE

## 2024-09-16 VITALS
SYSTOLIC BLOOD PRESSURE: 156 MMHG | BODY MASS INDEX: 30.28 KG/M2 | OXYGEN SATURATION: 97 % | TEMPERATURE: 97.8 F | HEART RATE: 60 BPM | DIASTOLIC BLOOD PRESSURE: 82 MMHG | WEIGHT: 211 LBS | RESPIRATION RATE: 17 BRPM

## 2024-09-16 PROCEDURE — 99213 OFFICE O/P EST LOW 20 MIN: CPT

## 2024-09-16 NOTE — DISEASE MANAGEMENT
[>20] : >20 ng/mL [Biopsy with Fusion] : Patient had a biopsy with fusion on [9] : Fusion Biopsy John Score: 9 [] : Patient had a Prostate MRI [5] : 5 [IV] : IV [Radical Prostatectomy] : Radical Prostatectomy [Radiation Therapy] : Radiation  Therapy [Patient had a radical prostatectomy] : Patient had a radical prostatectomy  [8] : Richmond Score 8 [Positive] : Positive margins [2] : T2 [1] : N1 [X] : MX [Treatment with radiation therapy] : Treatment with radiation therapy [EBRT] : EBRT [Pathological] : TNM Stage: p [BiopsyDate] : 9/5/2017 [MeasuredProstateVolume] : 40 [TotalCores] : 10 [TotalPositiveCores] : 4 [MaxCoreInvolvement] : 95 [RadicalProstatectomyDate] : 10/30/2017 [TotalNumberofnodesresected] : 3 [PositiveNodes] : 1 [RadiationCompletedDate] : 3/16/2018 [EBRTDose] : 7020cGy [EBRTFractions] : 39 [FreeTextEntry1] : See HPI for details.  [TTNM] : x [NTNM] : x [MTNM] : 1

## 2024-09-16 NOTE — REVIEW OF SYSTEMS
[Loss of Hearing] : loss of hearing [IPSS Score (0-40): ___] : IPSS score: [unfilled] [EPIC-CP Score (0-60): ___] : EPIC-CP score: [unfilled] [Negative] : Heme/Lymph [Constipation: Grade 0] : Constipation: Grade 0 [Diarrhea: Grade 0] : Diarrhea: Grade 0 [Dysphagia: Grade 0] : Dysphagia: Grade 0 [Esophagitis: Grade 0] : Esophagitis: Grade 0 [Nausea: Grade 0] : Nausea: Grade 0 [Vomiting: Grade 0] : Vomiting: Grade 0 [FreeTextEntry4] : deaf since childhood  [FreeTextEntry8] : nocturia of 0-1x, QOL 0

## 2024-09-16 NOTE — DISEASE MANAGEMENT
[>20] : >20 ng/mL [Biopsy with Fusion] : Patient had a biopsy with fusion on [9] : Fusion Biopsy John Score: 9 [] : Patient had a Prostate MRI [5] : 5 [IV] : IV [Radical Prostatectomy] : Radical Prostatectomy [Radiation Therapy] : Radiation  Therapy [Patient had a radical prostatectomy] : Patient had a radical prostatectomy  [8] : Luna Score 8 [Positive] : Positive margins [2] : T2 [1] : N1 [Treatment with radiation therapy] : Treatment with radiation therapy [X] : MX [EBRT] : EBRT [Pathological] : TNM Stage: p [BiopsyDate] : 9/5/2017 [MeasuredProstateVolume] : 40 [TotalCores] : 10 [TotalPositiveCores] : 4 [MaxCoreInvolvement] : 95 [RadicalProstatectomyDate] : 10/30/2017 [TotalNumberofnodesresected] : 3 [PositiveNodes] : 1 [RadiationCompletedDate] : 3/16/2018 [EBRTDose] : 7020cGy [EBRTFractions] : 39 [FreeTextEntry1] : See HPI for details.  [TTNM] : x [NTNM] : x [MTNM] : 1

## 2024-09-16 NOTE — REASON FOR VISIT
[Post-Treatment Evaluation] : post-treatment evaluation for prostate cancer [Other: ______] : provided by ANJANA [Interpreters_FullName] : Yumiko Nicholas  [TWNoteComboBox1] : American Sign Language

## 2024-09-16 NOTE — HISTORY OF PRESENT ILLNESS
[FreeTextEntry1] : Mr. Turk is a 69-year-old male who presents for post treatment evaluation appointment.  He is accompanied by  for today's visit.   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   Foundation medicine liquid biopsy performed January 2024 revealed BRCA2 [id number] and [id number].  History of RADIATION Therapy: 1/22/18 - 3/16/18: Received RADIATION Therapy to Prostate Bed/Pelvic LN, 7020 cGy in 39 fx  (Dr. Del Rio) with ADT x 6 months due to insurance reason.  7/15/24 - 7/19/24: Received RADIATION Therapy to Retrocrural Lymph Node, 3600 cGy in 3 fx  PSA Trend: 6/19/17 - 28.86 ng/mL *at diagnosis*  8/15/17 - 30.84  12/5/17 - 1.69 *post op* 9/13/18 - 0.1 *post radiation therapy, ADT* 3/11/19 - 0.2 10/1/19 - 1.53 1/7/20 - 2.64 2/11/20 - 3.74 2/19/20 - started on Lupron injections (Dr. Villatoro - [facility])  5/19/20 - 0.04 12/15/20 - 11/9/22: PSA remained < 0.01 ng/mL  2/1/23 - 0.03 4/25/23 - 0.10  718/23 - 0.34 10/17/23 - 1.22 1/22/24 - 2.24 4/23/24 - 3.13 7/23/24 - 3.08  HPI:  Mr. Turk was referred to urology in July 2017 for an elevated PSA of 28.86 ng/mL.   8/3/17 - MRI Pelvis: Prostate volume 40 cc. Lesion #1, low apex central gland at the midline. Lesion in in the very low apex where there is no fibrous capsule. PIRADS 5 No seminal vesicle involvement, no pelvic adenopathy, and no suspicious bone lesions.   9/5/17 - underwent Prostate Biopsy: Pathology - Adenocarcinoma of Prostate in 2/7 sites and 4/10 cores.  Llano score 4+5=9 in 1 core and John score 4+4=8 in 3 cores. 95% max. involvement.    10/4/17 - CT A/P: Enlarged prostate. Small sclerotic lesion in the right iliac bone is indeterminate. No lymphadenopathy.  10/4/17 - Bone Scan: Small, nonspecific foci of increased tracer accumulation in the spinous processes of L3 and L4 vertebrae and in the right iliac bone adjacent to the right sacroiliac joint. While these findings may represent post traumatic or degenerative changes, metastatic disease cannot be excluded.  Degenerative disease in the major joints.   10/23/17 - MRI Pelvis Bony: A 1.2 cm round low signal lesion adjacent to the right S1 joint within the right iliac bone that does not demonstrate internal fat and has mild peripheral edema signal on T2 fat-suppressed imaging. CT biopsy is warranted to rule out metastatic disease.    10/27/17 - underwent RIGHT Iliac Bone Biopsy:  Pathology - Negative for malignant cells.   10/30/17 - underwent Radical Prostatectomy with Dr. High (urology): Pathology - Adenocarcinoma of Prostate, Llano score 4+4=8, no extraprostatic extension or seminal vesicle involvement, margins involved by invasive carcinoma (left anterior apex focal and right posterior apex focal). Metastatic carcinoma in 1/3 lymph nodes.  pT2 pN1   12/5/17 - Post op PSA 1.69 ng/mL.  Referred for Post op salvage Radiation therapy.  1/22/18 - 3/16/18: Received RADIATION Therapy to Prostate Bed/Pelvic LN, 7020 cGy in 39 fx  (Dr. Del Rio) with ADT x 6 months due to insurance reason.   1/2020 - PSA now 2.64 ng/mL. PET scan ordered and referral made to medical oncology.   2/3/20 - PET Scan: No clear evidence of metastatic disease is identified. A small amount of activity is seen in a normal sized lymph node in each inguinal region.    2/11/20 - saw Dr. Villatoro ([facility]) in consultation. Despite the fact that there were no findings of note on the PET scan, the elevated PSA indicates that there are malignant cells growing, but they are not detectable at this particular time. Discussed the role of androgen deprivation therapy.  2/19/20 - Started on Lupron injections with Dr. Villatoro ([facility])  11/3/20 - PET Scan: 1. No scan evidence of recurrent disease in prostate bed. 2. Small PSMA-positive right retrocrural lymph node is compatible with metastasis. The intensity of radiotracer activity suggests intermediate PSMA expression. 3. Minimally avid 1 cm sclerotic density, posterior right iliac bone, unchanged on CT since 2017, probably is benign. 4. Branching nodular opacities and right middle lobe, slightly increased since 1/31/2020 and small, nonavid left lower lobe opacity, similar in appearance since 7/25/2023. A three-month follow-up with dedicated CT of chest is recommended for further evaluation.  12/15/20 - 11/9/22: PSA remained < 0.01 ng/mL.  Starting in February 2023 PSA slowly started to rise.  He continued on Lupron injections and follow up with Dr. Villatoro ([facility]).   7/25/23 - Bone Scan:  Findings suspicious for osseous metastasis involving the RIGHT iliac bone. Findings seen elsewhere in the spine largely appear to be due to degenerative change. 7/25/23 - CT A/P: Branching nodular opacities in the right middle lobe are increased from 1/31/2020. New left lower lobe 0.5 cm nodule. Sclerotic lesion in the right iliac bone, suspicious for osseous metastasis.  1017/23 - PSA 1.22 ng/mL. PSMA PET ordered.   11/3/23 - PSMA PET: 1. No scan evidence of recurrent disease in prostate bed. 2. Small PSMA-positive right retrocrural lymph node is compatible with metastasis. The intensity of radiotracer activity suggests intermediate PSMA expression. 3. Minimally avid 1 cm sclerotic density, posterior right iliac bone, unchanged on CT since 2017, probably is benign. 4. Branching nodular opacities and right middle lobe, slightly increased since 1/31/2020 and small, nonavid left lower lobe opacity, similar in appearance since 7/25/2023. A three-month follow-up with dedicated CT of chest is recommended for further evaluation.  Foundation medicine liquid biopsy performed January 2024 revealed BRCA2 [id number] and [id number].  2/8/24 - CT Chest: Unchanged small tubular branching opacities in the lower lobes and middle lobe, likely secondary to distal airway impaction. Recommend three-month follow-up to assess stability. Increased size of 1 cm right retrocrural lymph node, compatible with metastasis based on prior PET.  4/23/24 - saw Dr. Villatoro ([facility]) in follow up.  PSA now 3.13 ng/mL. PSMA PET ordered. He had a liquid biopsy performed revealing a BRCA2 mutation. Explained the meaning of this. He does not have any children, but he has a sister, and for this reason he should have germline testing performed. He says that his mother had breast cancer in the past. A referral was made to the genetic counselor. If treatment is to begin, he would be started on abiraterone and prednisone along with Olaparib as well.   5/9/24 - PET Scan: 1. No scan evidence of recurrent disease in prostate bed. 2. A PSMA-positive right retrocrural lymph node is increased in size and avidity, compatible with metastasis. The intensity of radiotracer activity suggests high PSMA expression. 3. New tiny focus of increased radiotracer activity in the lateral aspect left seventh rib, without corresponding abnormality on CT, is indeterminate, possibly early bone metastasis (SUV 5.0; image 167). Correlate clinically for history of trauma. 4. Tubular branching opacity in right middle lobe, not significantly changed, compatible with mucoid impaction. Previously noted nonavid left lower lobe opacity is not well seen. Consider 6-month follow-up with dedicated CT of chest.  5/14/24 - saw Dr. Villatoro ([facility]) in follow up.  Continues on Lupron monotherapy. Had repeat PSMA PET.  Communicated with radiation oncology about the feasibility if SBRT to the one clear site of disease. occ minor pain in the right groin area. As only one site of disease and if he has good control via SBRT, he may not require a change in his systemic therapy at this time based on what his PSA does subsequent to SBRT. Referral to radiation oncology made.   5/31/24 - presented in consultation to discuss radiation therapy options.  Mr. Turk is feeling well today, has no complaints.  Denies any pain.  No urinary or bowel complaints.  IPSS 1 / QOL 0 / EPIC-CP 1 (ED portion not completed) He looks forward to next steps in his treatment.  He states he will be going away the end of June into July, Dr. Villatoro ([facility]) aware. Discussed the role of radiation to the right retrocrural node using an ablative technique of SBRT over 3- 5 treatments in order to treat with maximal dose intensity. We have discussed he may progress outside of the radiation field. We have discussed logistics and possible acute and late side effects in detail. He has signed consent and will return for treatment. He is planning to travel at the end of the month so will most likely have treatment on his return.  7/15/24 - 7/19/24: Received RADIATION Therapy to Retrocrural Lymph Node, 3600 cGy in 3 fx  7/23/24 - saw Dr. Villatoro ([facility]) in follow up, continues on Lupron monotherapy. Completed RT to oligometastatic prostate cancer with retrocrural LN. Had SBRT to the retro-crural node, only site of disease.  Recently completed the radiation therapy, and did not expect him to have a significant drop in his PSA as yet. In fact, his PSA may have increased provide prior to his radiation. He will be seen once again in 3 months' time for his next injection. Hold off on giving him any additional medication at this time. Follow up on 10/8/24.   9/16/24 - presents for post treatment evaluation follow up.  Mr. Turk is feeling well today, has no complaints. He did well after the radiation, had some fatigue but that has improved.  Denies any sore throat or SOB.  Urinary symptoms are stable, nocturia of 0-1x.  IPSS 3 / QOL 0 / EPIC-CP 2 (ED portion not completed) He is to follow up with Dr. Villatoro's office ([facility]) on 10/8/24, PSA to be done at that time.  Continues on Lupron/Eligard injections, next one on 10/8 as well.

## 2024-09-16 NOTE — HISTORY OF PRESENT ILLNESS
[FreeTextEntry1] : Mr. Turk is a 69-year-old male who presents for post treatment evaluation appointment.  He is accompanied by  for today's visit.   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   Foundation medicine liquid biopsy performed January 2024 revealed BRCA2 [id number] and [id number].  History of RADIATION Therapy: 1/22/18 - 3/16/18: Received RADIATION Therapy to Prostate Bed/Pelvic LN, 7020 cGy in 39 fx  (Dr. Del Rio) with ADT x 6 months due to insurance reason.  7/15/24 - 7/19/24: Received RADIATION Therapy to Retrocrural Lymph Node, 3600 cGy in 3 fx  PSA Trend: 6/19/17 - 28.86 ng/mL *at diagnosis*  8/15/17 - 30.84  12/5/17 - 1.69 *post op* 9/13/18 - 0.1 *post radiation therapy, ADT* 3/11/19 - 0.2 10/1/19 - 1.53 1/7/20 - 2.64 2/11/20 - 3.74 2/19/20 - started on Lupron injections (Dr. Villatoro - [facility])  5/19/20 - 0.04 12/15/20 - 11/9/22: PSA remained < 0.01 ng/mL  2/1/23 - 0.03 4/25/23 - 0.10  718/23 - 0.34 10/17/23 - 1.22 1/22/24 - 2.24 4/23/24 - 3.13 7/23/24 - 3.08  HPI:  Mr. Turk was referred to urology in July 2017 for an elevated PSA of 28.86 ng/mL.   8/3/17 - MRI Pelvis: Prostate volume 40 cc. Lesion #1, low apex central gland at the midline. Lesion in in the very low apex where there is no fibrous capsule. PIRADS 5 No seminal vesicle involvement, no pelvic adenopathy, and no suspicious bone lesions.   9/5/17 - underwent Prostate Biopsy: Pathology - Adenocarcinoma of Prostate in 2/7 sites and 4/10 cores.  Drifton score 4+5=9 in 1 core and John score 4+4=8 in 3 cores. 95% max. involvement.    10/4/17 - CT A/P: Enlarged prostate. Small sclerotic lesion in the right iliac bone is indeterminate. No lymphadenopathy.  10/4/17 - Bone Scan: Small, nonspecific foci of increased tracer accumulation in the spinous processes of L3 and L4 vertebrae and in the right iliac bone adjacent to the right sacroiliac joint. While these findings may represent post traumatic or degenerative changes, metastatic disease cannot be excluded.  Degenerative disease in the major joints.   10/23/17 - MRI Pelvis Bony: A 1.2 cm round low signal lesion adjacent to the right S1 joint within the right iliac bone that does not demonstrate internal fat and has mild peripheral edema signal on T2 fat-suppressed imaging. CT biopsy is warranted to rule out metastatic disease.    10/27/17 - underwent RIGHT Iliac Bone Biopsy:  Pathology - Negative for malignant cells.   10/30/17 - underwent Radical Prostatectomy with Dr. High (urology): Pathology - Adenocarcinoma of Prostate, Drifton score 4+4=8, no extraprostatic extension or seminal vesicle involvement, margins involved by invasive carcinoma (left anterior apex focal and right posterior apex focal). Metastatic carcinoma in 1/3 lymph nodes.  pT2 pN1   12/5/17 - Post op PSA 1.69 ng/mL.  Referred for Post op salvage Radiation therapy.  1/22/18 - 3/16/18: Received RADIATION Therapy to Prostate Bed/Pelvic LN, 7020 cGy in 39 fx  (Dr. Del Rio) with ADT x 6 months due to insurance reason.   1/2020 - PSA now 2.64 ng/mL. PET scan ordered and referral made to medical oncology.   2/3/20 - PET Scan: No clear evidence of metastatic disease is identified. A small amount of activity is seen in a normal sized lymph node in each inguinal region.    2/11/20 - saw Dr. Villatoro ([facility]) in consultation. Despite the fact that there were no findings of note on the PET scan, the elevated PSA indicates that there are malignant cells growing, but they are not detectable at this particular time. Discussed the role of androgen deprivation therapy.  2/19/20 - Started on Lupron injections with Dr. Villatoro ([facility])  11/3/20 - PET Scan: 1. No scan evidence of recurrent disease in prostate bed. 2. Small PSMA-positive right retrocrural lymph node is compatible with metastasis. The intensity of radiotracer activity suggests intermediate PSMA expression. 3. Minimally avid 1 cm sclerotic density, posterior right iliac bone, unchanged on CT since 2017, probably is benign. 4. Branching nodular opacities and right middle lobe, slightly increased since 1/31/2020 and small, nonavid left lower lobe opacity, similar in appearance since 7/25/2023. A three-month follow-up with dedicated CT of chest is recommended for further evaluation.  12/15/20 - 11/9/22: PSA remained < 0.01 ng/mL.  Starting in February 2023 PSA slowly started to rise.  He continued on Lupron injections and follow up with Dr. Villatoro ([facility]).   7/25/23 - Bone Scan:  Findings suspicious for osseous metastasis involving the RIGHT iliac bone. Findings seen elsewhere in the spine largely appear to be due to degenerative change. 7/25/23 - CT A/P: Branching nodular opacities in the right middle lobe are increased from 1/31/2020. New left lower lobe 0.5 cm nodule. Sclerotic lesion in the right iliac bone, suspicious for osseous metastasis.  1017/23 - PSA 1.22 ng/mL. PSMA PET ordered.   11/3/23 - PSMA PET: 1. No scan evidence of recurrent disease in prostate bed. 2. Small PSMA-positive right retrocrural lymph node is compatible with metastasis. The intensity of radiotracer activity suggests intermediate PSMA expression. 3. Minimally avid 1 cm sclerotic density, posterior right iliac bone, unchanged on CT since 2017, probably is benign. 4. Branching nodular opacities and right middle lobe, slightly increased since 1/31/2020 and small, nonavid left lower lobe opacity, similar in appearance since 7/25/2023. A three-month follow-up with dedicated CT of chest is recommended for further evaluation.  Foundation medicine liquid biopsy performed January 2024 revealed BRCA2 [id number] and [id number].  2/8/24 - CT Chest: Unchanged small tubular branching opacities in the lower lobes and middle lobe, likely secondary to distal airway impaction. Recommend three-month follow-up to assess stability. Increased size of 1 cm right retrocrural lymph node, compatible with metastasis based on prior PET.  4/23/24 - saw Dr. Villatoro ([facility]) in follow up.  PSA now 3.13 ng/mL. PSMA PET ordered. He had a liquid biopsy performed revealing a BRCA2 mutation. Explained the meaning of this. He does not have any children, but he has a sister, and for this reason he should have germline testing performed. He says that his mother had breast cancer in the past. A referral was made to the genetic counselor. If treatment is to begin, he would be started on abiraterone and prednisone along with Olaparib as well.   5/9/24 - PET Scan: 1. No scan evidence of recurrent disease in prostate bed. 2. A PSMA-positive right retrocrural lymph node is increased in size and avidity, compatible with metastasis. The intensity of radiotracer activity suggests high PSMA expression. 3. New tiny focus of increased radiotracer activity in the lateral aspect left seventh rib, without corresponding abnormality on CT, is indeterminate, possibly early bone metastasis (SUV 5.0; image 167). Correlate clinically for history of trauma. 4. Tubular branching opacity in right middle lobe, not significantly changed, compatible with mucoid impaction. Previously noted nonavid left lower lobe opacity is not well seen. Consider 6-month follow-up with dedicated CT of chest.  5/14/24 - saw Dr. Villatoro ([facility]) in follow up.  Continues on Lupron monotherapy. Had repeat PSMA PET.  Communicated with radiation oncology about the feasibility if SBRT to the one clear site of disease. occ minor pain in the right groin area. As only one site of disease and if he has good control via SBRT, he may not require a change in his systemic therapy at this time based on what his PSA does subsequent to SBRT. Referral to radiation oncology made.   5/31/24 - presented in consultation to discuss radiation therapy options.  Mr. Turk is feeling well today, has no complaints.  Denies any pain.  No urinary or bowel complaints.  IPSS 1 / QOL 0 / EPIC-CP 1 (ED portion not completed) He looks forward to next steps in his treatment.  He states he will be going away the end of June into July, Dr. Villatoro ([facility]) aware. Discussed the role of radiation to the right retrocrural node using an ablative technique of SBRT over 3- 5 treatments in order to treat with maximal dose intensity. We have discussed he may progress outside of the radiation field. We have discussed logistics and possible acute and late side effects in detail. He has signed consent and will return for treatment. He is planning to travel at the end of the month so will most likely have treatment on his return.  7/15/24 - 7/19/24: Received RADIATION Therapy to Retrocrural Lymph Node, 3600 cGy in 3 fx  7/23/24 - saw Dr. Villatoro ([facility]) in follow up, continues on Lupron monotherapy. Completed RT to oligometastatic prostate cancer with retrocrural LN. Had SBRT to the retro-crural node, only site of disease.  Recently completed the radiation therapy, and did not expect him to have a significant drop in his PSA as yet. In fact, his PSA may have increased provide prior to his radiation. He will be seen once again in 3 months' time for his next injection. Hold off on giving him any additional medication at this time. Follow up on 10/8/24.   9/16/24 - presents for post treatment evaluation follow up.  Mr. Turk is feeling well today, has no complaints. He did well after the radiation, had some fatigue but that has improved.  Denies any sore throat or SOB.  Urinary symptoms are stable, nocturia of 0-1x.  IPSS 3 / QOL 0 / EPIC-CP 2 (ED portion not completed) He is to follow up with Dr. Villatoro's office ([facility]) on 10/8/24, PSA to be done at that time.  Continues on Lupron/Eligard injections, next one on 10/8 as well.

## 2024-09-26 ENCOUNTER — OUTPATIENT (OUTPATIENT)
Dept: OUTPATIENT SERVICES | Facility: HOSPITAL | Age: 69
LOS: 1 days | Discharge: HOME | End: 2024-09-26

## 2024-09-26 DIAGNOSIS — C61 MALIGNANT NEOPLASM OF PROSTATE: ICD-10-CM

## 2024-09-26 DIAGNOSIS — K40.90 UNILATERAL INGUINAL HERNIA, WITHOUT OBSTRUCTION OR GANGRENE, NOT SPECIFIED AS RECURRENT: Chronic | ICD-10-CM

## 2024-09-30 ENCOUNTER — APPOINTMENT (OUTPATIENT)
Dept: OTOLARYNGOLOGY | Facility: CLINIC | Age: 69
End: 2024-09-30
Payer: MEDICARE

## 2024-09-30 VITALS
HEART RATE: 61 BPM | BODY MASS INDEX: 30.21 KG/M2 | HEIGHT: 70 IN | WEIGHT: 211 LBS | DIASTOLIC BLOOD PRESSURE: 70 MMHG | SYSTOLIC BLOOD PRESSURE: 123 MMHG

## 2024-09-30 DIAGNOSIS — J34.2 DEVIATED NASAL SEPTUM: ICD-10-CM

## 2024-09-30 DIAGNOSIS — R09.81 NASAL CONGESTION: ICD-10-CM

## 2024-09-30 DIAGNOSIS — K21.9 GASTRO-ESOPHAGEAL REFLUX DISEASE W/OUT ESOPHAGITIS: ICD-10-CM

## 2024-09-30 DIAGNOSIS — J34.3 HYPERTROPHY OF NASAL TURBINATES: ICD-10-CM

## 2024-09-30 DIAGNOSIS — H91.3 DEAF NONSPEAKING, NOT ELSEWHERE CLASSIFIED: ICD-10-CM

## 2024-09-30 PROCEDURE — 99214 OFFICE O/P EST MOD 30 MIN: CPT | Mod: 25

## 2024-09-30 PROCEDURE — 31231 NASAL ENDOSCOPY DX: CPT

## 2024-09-30 NOTE — REASON FOR VISIT
[Subsequent Evaluation] : a subsequent evaluation for [[employer] ] : provided by [employer]   [FreeTextEntry2] : ears, nose and throat [Interpreters_IDNumber] : [id number]

## 2024-09-30 NOTE — ASSESSMENT
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 Benign ear exam.  - audio performed and reviewed 06/03/ - reviewed with pt Discussed with patient and family options for treatment of hearing loss. Discussed how it is functionally limiting their ADL's and social enjoyment and engagement. At this junction they would like to consider hearing appliance to aid in hearing and improve function. Placed a referral to audiology for a hearing aid evaluation and education on options.  Also with phlegm in his throat. On exam, erythema and edema consistent with acid reflux, some redundant tissue.  - will continue lifestyle regiment to reduce overproduction of acid and reduce laryngeal reflux including avoiding caffein, alcohol, eating before bed, spicy and fatty foods, and head elevation at night etc. Handout detailing regiment also given sx have improved  Also presents for evaluation of his nose, hx of sinus surgery 30 years ago. On exam, R DNS, right caudal deflection, BITH. No polyps seen on scope.  Will start regiment to see if may improve symptoms and escalate if needed    - Will start Flonase. A topical steroid reduce mucosal swelling, illustrated appropriate use and how to reduce the risk of bleeding    - start azelastine  - Nasal irrigation and showed how to use it to maximize effectiveness - Atrovent nasal spray - can discontinue if sx improved with nasal sprays   follow up in 6 weeks

## 2024-09-30 NOTE — CONSULT LETTER
[FreeTextEntry1] : Dear Dr. ALINA BERMUDEZ  I had the pleasure of evaluating your patient MARY BROWN, thank you for allowing us to participate in their care. please see full note detailing our visit below. If you have any questions, please do not hesitate to call me and I would be happy to discuss further.   Rafael Salter M.D. Attending Physician,   Department of Otolaryngology - Head and Neck Surgery [facility]  Office: [phone] Fax: [phone] '

## 2024-09-30 NOTE — PROCEDURE
[FreeTextEntry6] : Procedure performed: Nasal Endoscopy- Diagnostic Pre-op indication(s): nasal congestion Post-op indication(s): nasal congestion  Verbal and/or written consent obtained from patient Anterior rhinoscopy insufficient to account for symptoms Scope #: 3,  flexible fiber optic telescope  The scope was introduced in the nasal passage between the middle and inferior turbinates to exam the inferior portion of the middle meatus and the fontanelle, as well as the maxillary ostia.  Next, the scope was passed medically and posteriorly to the middle turbinates to examine the sphenoethmoid recess and the superior turbinate region. Upon visualization the finders are as follows: Nasal Septum: R DNS Bilateral - Mucosa: boggy turbinates, Mucous: scant, Polyp: not seen, Inferior Turbinate: boggy, Middle Turbinate: normal, Superior Turbinate: normal, Inferior Meatus: narrow, Middle Meatus: narrow, Super Meatus:normal, Sphenoethmoidal Recess: clear [de-identified] : Procedure performed: laryngeal Endoscopy- Diagnostic Pre-op/post op indication: dysphonia Verbal and/or written consent obtained from patient, Patient was unable to cooperate with mirror Scope #: 3, flexible fiber optic telescope used  Scope was introduced through the nose passed on the floor of the nose to the nasopharynx and then followed down the soft palate to the lower pharynx. The tongue Base, Larynx, Hypopharynx were examined. Base of tongue was symmetric, vallecular was clear, epiglottis was not deformed, subglottis/ pyriform and posterior pharyngeal walls were clear. No erythema, edema, pooling of secretions, masses or lesions. Airway patent, no foreign body visualized. Postcricoid area with moderate erythema and mild edema. + intra-artenoid bar. No pooling of secretions. True vocal cords, vestibular folds, ventricles, pyriform sinuses, and aryepiglottic folds appear normal bilaterally. Vocal cords mobile with good contact b/l. redundant tissue

## 2024-09-30 NOTE — HISTORY OF PRESENT ILLNESS
[de-identified] : 68 year old male presents for evaluation of ears, nose and throat, utilized . States his mouth and throat are frequently dry. Denies difficulty swallowing. States dryness is worse in the morning.   States 30-40 years ago got surgery for his sinuses, since then doing well. States has a lymph node near colon which reacted when got a PET scan for prostate cancer, will be getting radiation for lymph node. 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 Patient states he was born normal and got sick, states thinks abx at that time made him loose hearing. then went deaf and lost voice. has not noticed recent changes. no Vertigo, tinnitus, pain, drainage or facial weakness.  patient following up with constant mucus and phlegm in his throat. Has been drinking hot teas to cough up the mucus. After last visit had an intermittent cough and lost his voice for 5 days which has improved but still with sensation of mucus dripping back into his throat.   ears doing ok  [FreeTextEntry1] : Patient is following up for excess mucus in nose and throat,  has used nasal spray 3 times a day which is helping significantly. Also taking acid reflux medication,  does not feel phelgm in throat anymore.    is stuffy and has pressure at his nose. States using atrovent three times a day which helps a lot.  might need something stronger, as its helping but not enough yet. Denies allergies.

## 2024-10-03 ENCOUNTER — APPOINTMENT (OUTPATIENT)
Dept: DERMATOLOGY | Facility: CLINIC | Age: 69
End: 2024-10-03
Payer: MEDICARE

## 2024-10-03 DIAGNOSIS — L71.9 ROSACEA, UNSPECIFIED: ICD-10-CM

## 2024-10-03 DIAGNOSIS — L82.1 OTHER SEBORRHEIC KERATOSIS: ICD-10-CM

## 2024-10-03 PROCEDURE — 99213 OFFICE O/P EST LOW 20 MIN: CPT

## 2024-10-03 NOTE — HISTORY OF PRESENT ILLNESS
[FreeTextEntry1] : Fu rosacea  [de-identified] : MARY BROWN is a 69 year M presenting today for evaluation of the followin. Rosacea; improving with metronidazole cream BID. Not wearing sunscreen regularly.  2. Two bumps on his left dorsal hand as well as one on his right cheek.  [facility] video  ID [id number] used

## 2024-10-03 NOTE — ASSESSMENT
[FreeTextEntry1] : # Rosacea - ETT and papulopustular type, chronic, stable  - Reviewed risks (as well as mitigation strategies for adverse drug events as applicable), benefits, and alternatives of therapy  - cont metronidazole cream BID to AA - sun protection using SPF 30 or higher sunscreen  # Seborrheic Keratosis - These growths are benign - Related to genetics - these lesions run in families; NOT related to sun exposure - No treatment warranted unless inflamed   RTC prn

## 2024-10-08 ENCOUNTER — RESULT REVIEW (OUTPATIENT)
Age: 69
End: 2024-10-08

## 2024-10-08 ENCOUNTER — APPOINTMENT (OUTPATIENT)
Dept: HEMATOLOGY ONCOLOGY | Facility: CLINIC | Age: 69
End: 2024-10-08
Payer: MEDICARE

## 2024-10-08 ENCOUNTER — APPOINTMENT (OUTPATIENT)
Dept: INFUSION THERAPY | Facility: HOSPITAL | Age: 69
End: 2024-10-08

## 2024-10-08 VITALS
OXYGEN SATURATION: 97 % | RESPIRATION RATE: 18 BRPM | DIASTOLIC BLOOD PRESSURE: 91 MMHG | WEIGHT: 214.99 LBS | BODY MASS INDEX: 30.85 KG/M2 | HEART RATE: 61 BPM | SYSTOLIC BLOOD PRESSURE: 155 MMHG | TEMPERATURE: 97.9 F

## 2024-10-08 DIAGNOSIS — R60.0 LOCALIZED EDEMA: ICD-10-CM

## 2024-10-08 DIAGNOSIS — C61 MALIGNANT NEOPLASM OF PROSTATE: ICD-10-CM

## 2024-10-08 DIAGNOSIS — R59.0 LOCALIZED ENLARGED LYMPH NODES: ICD-10-CM

## 2024-10-08 DIAGNOSIS — Z79.818 LONG TERM (CURRENT) USE OF OTHER AGENTS AFFECTING ESTROGEN RECEPTORS AND ESTROGEN LEVELS: ICD-10-CM

## 2024-10-08 LAB
ALBUMIN SERPL ELPH-MCNC: 4.1 G/DL
ALP BLD-CCNC: 73 U/L
ALT SERPL-CCNC: 20 U/L
ANION GAP SERPL CALC-SCNC: 14 MMOL/L
AST SERPL-CCNC: 22 U/L
BASOPHILS # BLD AUTO: 0.04 K/UL — SIGNIFICANT CHANGE UP (ref 0–0.2)
BASOPHILS NFR BLD AUTO: 0.7 % — SIGNIFICANT CHANGE UP (ref 0–2)
BILIRUB SERPL-MCNC: 0.4 MG/DL
BUN SERPL-MCNC: 19 MG/DL
CALCIUM SERPL-MCNC: 9.5 MG/DL
CHLORIDE SERPL-SCNC: 106 MMOL/L
CO2 SERPL-SCNC: 24 MMOL/L
CREAT SERPL-MCNC: 1.14 MG/DL
EGFR: 69 ML/MIN/1.73M2
EOSINOPHIL # BLD AUTO: 0.14 K/UL — SIGNIFICANT CHANGE UP (ref 0–0.5)
EOSINOPHIL NFR BLD AUTO: 2.6 % — SIGNIFICANT CHANGE UP (ref 0–6)
GLUCOSE SERPL-MCNC: 102 MG/DL
HCT VFR BLD CALC: 40.4 % — SIGNIFICANT CHANGE UP (ref 39–50)
HGB BLD-MCNC: 13.6 G/DL — SIGNIFICANT CHANGE UP (ref 13–17)
IMM GRANULOCYTES NFR BLD AUTO: 0.6 % — SIGNIFICANT CHANGE UP (ref 0–0.9)
LYMPHOCYTES # BLD AUTO: 1.74 K/UL — SIGNIFICANT CHANGE UP (ref 1–3.3)
LYMPHOCYTES # BLD AUTO: 32.6 % — SIGNIFICANT CHANGE UP (ref 13–44)
MCHC RBC-ENTMCNC: 31.3 PG — SIGNIFICANT CHANGE UP (ref 27–34)
MCHC RBC-ENTMCNC: 33.7 G/DL — SIGNIFICANT CHANGE UP (ref 32–36)
MCV RBC AUTO: 92.9 FL — SIGNIFICANT CHANGE UP (ref 80–100)
MONOCYTES # BLD AUTO: 0.74 K/UL — SIGNIFICANT CHANGE UP (ref 0–0.9)
MONOCYTES NFR BLD AUTO: 13.9 % — SIGNIFICANT CHANGE UP (ref 2–14)
NEUTROPHILS # BLD AUTO: 2.65 K/UL — SIGNIFICANT CHANGE UP (ref 1.8–7.4)
NEUTROPHILS NFR BLD AUTO: 49.6 % — SIGNIFICANT CHANGE UP (ref 43–77)
NRBC # BLD: 0 /100 WBCS — SIGNIFICANT CHANGE UP (ref 0–0)
NRBC BLD-RTO: 0 /100 WBCS — SIGNIFICANT CHANGE UP (ref 0–0)
PLATELET # BLD AUTO: 182 K/UL — SIGNIFICANT CHANGE UP (ref 150–400)
POTASSIUM SERPL-SCNC: 4.8 MMOL/L
PROT SERPL-MCNC: 7 G/DL
PSA SERPL-MCNC: 1.53 NG/ML
RBC # BLD: 4.35 M/UL — SIGNIFICANT CHANGE UP (ref 4.2–5.8)
RBC # FLD: 12.8 % — SIGNIFICANT CHANGE UP (ref 10.3–14.5)
SODIUM SERPL-SCNC: 143 MMOL/L
WBC # BLD: 5.34 K/UL — SIGNIFICANT CHANGE UP (ref 3.8–10.5)
WBC # FLD AUTO: 5.34 K/UL — SIGNIFICANT CHANGE UP (ref 3.8–10.5)

## 2024-10-08 PROCEDURE — 99214 OFFICE O/P EST MOD 30 MIN: CPT

## 2024-10-08 PROCEDURE — G2211 COMPLEX E/M VISIT ADD ON: CPT

## 2024-10-28 ENCOUNTER — APPOINTMENT (OUTPATIENT)
Dept: OTOLARYNGOLOGY | Facility: CLINIC | Age: 69
End: 2024-10-28

## 2024-10-28 ENCOUNTER — RX RENEWAL (OUTPATIENT)
Age: 69
End: 2024-10-28

## 2024-11-08 ENCOUNTER — TRANSCRIPTION ENCOUNTER (OUTPATIENT)
Age: 69
End: 2024-11-08

## 2025-01-03 ENCOUNTER — APPOINTMENT (OUTPATIENT)
Dept: INTERNAL MEDICINE | Facility: CLINIC | Age: 70
End: 2025-01-03
Payer: MEDICARE

## 2025-01-03 VITALS
HEART RATE: 64 BPM | HEIGHT: 71 IN | WEIGHT: 218 LBS | SYSTOLIC BLOOD PRESSURE: 128 MMHG | BODY MASS INDEX: 30.52 KG/M2 | OXYGEN SATURATION: 96 % | DIASTOLIC BLOOD PRESSURE: 76 MMHG | TEMPERATURE: 97.3 F

## 2025-01-03 DIAGNOSIS — S29.9XXA UNSPECIFIED INJURY OF THORAX, INITIAL ENCOUNTER: ICD-10-CM

## 2025-01-03 PROCEDURE — 99213 OFFICE O/P EST LOW 20 MIN: CPT

## 2025-01-03 PROCEDURE — G2211 COMPLEX E/M VISIT ADD ON: CPT

## 2025-01-03 RX ORDER — IBUPROFEN 800 MG/1
800 TABLET, FILM COATED ORAL 3 TIMES DAILY
Qty: 21 | Refills: 0 | Status: ACTIVE | COMMUNITY
Start: 2025-01-03 | End: 1900-01-01

## 2025-01-09 ENCOUNTER — OUTPATIENT (OUTPATIENT)
Dept: OUTPATIENT SERVICES | Facility: HOSPITAL | Age: 70
LOS: 1 days | End: 2025-01-09
Payer: MEDICARE

## 2025-01-09 ENCOUNTER — APPOINTMENT (OUTPATIENT)
Dept: RADIOLOGY | Facility: IMAGING CENTER | Age: 70
End: 2025-01-09
Payer: MEDICARE

## 2025-01-09 DIAGNOSIS — K40.90 UNILATERAL INGUINAL HERNIA, WITHOUT OBSTRUCTION OR GANGRENE, NOT SPECIFIED AS RECURRENT: Chronic | ICD-10-CM

## 2025-01-09 DIAGNOSIS — S29.9XXA UNSPECIFIED INJURY OF THORAX, INITIAL ENCOUNTER: ICD-10-CM

## 2025-01-09 PROCEDURE — 71100 X-RAY EXAM RIBS UNI 2 VIEWS: CPT | Mod: 26

## 2025-01-09 PROCEDURE — 71100 X-RAY EXAM RIBS UNI 2 VIEWS: CPT

## 2025-01-20 ENCOUNTER — TRANSCRIPTION ENCOUNTER (OUTPATIENT)
Age: 70
End: 2025-01-20

## 2025-01-21 ENCOUNTER — TRANSCRIPTION ENCOUNTER (OUTPATIENT)
Age: 70
End: 2025-01-21

## 2025-01-30 ENCOUNTER — RX RENEWAL (OUTPATIENT)
Age: 70
End: 2025-01-30

## 2025-01-31 ENCOUNTER — APPOINTMENT (OUTPATIENT)
Dept: PODIATRY | Facility: CLINIC | Age: 70
End: 2025-01-31

## 2025-01-31 DIAGNOSIS — M79.673 PAIN IN UNSPECIFIED FOOT: ICD-10-CM

## 2025-01-31 DIAGNOSIS — I83.90 ASYMPTOMATIC VARICOSE VEINS OF UNSPECIFIED LOWER EXTREMITY: ICD-10-CM

## 2025-01-31 PROCEDURE — 99203 OFFICE O/P NEW LOW 30 MIN: CPT | Mod: 25

## 2025-01-31 PROCEDURE — 73630 X-RAY EXAM OF FOOT: CPT | Mod: RT

## 2025-02-06 ENCOUNTER — OUTPATIENT (OUTPATIENT)
Dept: OUTPATIENT SERVICES | Facility: HOSPITAL | Age: 70
LOS: 1 days | Discharge: HOME | End: 2025-02-06

## 2025-02-06 DIAGNOSIS — K40.90 UNILATERAL INGUINAL HERNIA, WITHOUT OBSTRUCTION OR GANGRENE, NOT SPECIFIED AS RECURRENT: Chronic | ICD-10-CM

## 2025-02-06 DIAGNOSIS — C61 MALIGNANT NEOPLASM OF PROSTATE: ICD-10-CM

## 2025-02-06 PROBLEM — M79.673 PAIN, FOOT: Status: ACTIVE | Noted: 2025-02-05

## 2025-02-06 PROBLEM — I83.90 VARICOSE VEINS OF LOWER EXTREMITY: Status: ACTIVE | Noted: 2025-02-05

## 2025-02-10 ENCOUNTER — APPOINTMENT (OUTPATIENT)
Dept: INFUSION THERAPY | Facility: HOSPITAL | Age: 70
End: 2025-02-10

## 2025-02-10 ENCOUNTER — RESULT REVIEW (OUTPATIENT)
Age: 70
End: 2025-02-10

## 2025-02-10 ENCOUNTER — APPOINTMENT (OUTPATIENT)
Dept: HEMATOLOGY ONCOLOGY | Facility: CLINIC | Age: 70
End: 2025-02-10
Payer: MEDICARE

## 2025-02-10 VITALS
SYSTOLIC BLOOD PRESSURE: 125 MMHG | WEIGHT: 216.93 LBS | HEART RATE: 60 BPM | DIASTOLIC BLOOD PRESSURE: 73 MMHG | TEMPERATURE: 97.3 F | BODY MASS INDEX: 30.26 KG/M2 | RESPIRATION RATE: 16 BRPM | OXYGEN SATURATION: 97 %

## 2025-02-10 DIAGNOSIS — C61 MALIGNANT NEOPLASM OF PROSTATE: ICD-10-CM

## 2025-02-10 LAB
ALBUMIN SERPL ELPH-MCNC: 4.2 G/DL
ALP BLD-CCNC: 80 U/L
ALT SERPL-CCNC: 17 U/L
ANION GAP SERPL CALC-SCNC: 12 MMOL/L
AST SERPL-CCNC: 24 U/L
BASOPHILS # BLD AUTO: 0.03 K/UL — SIGNIFICANT CHANGE UP (ref 0–0.2)
BASOPHILS NFR BLD AUTO: 0.6 % — SIGNIFICANT CHANGE UP (ref 0–2)
BILIRUB SERPL-MCNC: 0.4 MG/DL
BUN SERPL-MCNC: 20 MG/DL
CALCIUM SERPL-MCNC: 9.7 MG/DL
CHLORIDE SERPL-SCNC: 106 MMOL/L
CO2 SERPL-SCNC: 25 MMOL/L
CREAT SERPL-MCNC: 1.28 MG/DL
EGFR: 60 ML/MIN/1.73M2
EOSINOPHIL # BLD AUTO: 0.11 K/UL — SIGNIFICANT CHANGE UP (ref 0–0.5)
EOSINOPHIL NFR BLD AUTO: 2 % — SIGNIFICANT CHANGE UP (ref 0–6)
GLUCOSE SERPL-MCNC: 97 MG/DL
HCT VFR BLD CALC: 42.8 % — SIGNIFICANT CHANGE UP (ref 39–50)
HGB BLD-MCNC: 14.2 G/DL — SIGNIFICANT CHANGE UP (ref 13–17)
IMM GRANULOCYTES NFR BLD AUTO: 0.4 % — SIGNIFICANT CHANGE UP (ref 0–0.9)
LYMPHOCYTES # BLD AUTO: 1.75 K/UL — SIGNIFICANT CHANGE UP (ref 1–3.3)
LYMPHOCYTES # BLD AUTO: 32.4 % — SIGNIFICANT CHANGE UP (ref 13–44)
MCHC RBC-ENTMCNC: 31.3 PG — SIGNIFICANT CHANGE UP (ref 27–34)
MCHC RBC-ENTMCNC: 33.2 G/DL — SIGNIFICANT CHANGE UP (ref 32–36)
MCV RBC AUTO: 94.3 FL — SIGNIFICANT CHANGE UP (ref 80–100)
MONOCYTES # BLD AUTO: 0.7 K/UL — SIGNIFICANT CHANGE UP (ref 0–0.9)
MONOCYTES NFR BLD AUTO: 13 % — SIGNIFICANT CHANGE UP (ref 2–14)
NEUTROPHILS # BLD AUTO: 2.79 K/UL — SIGNIFICANT CHANGE UP (ref 1.8–7.4)
NEUTROPHILS NFR BLD AUTO: 51.6 % — SIGNIFICANT CHANGE UP (ref 43–77)
NRBC # BLD: 0 /100 WBCS — SIGNIFICANT CHANGE UP (ref 0–0)
NRBC BLD-RTO: 0 /100 WBCS — SIGNIFICANT CHANGE UP (ref 0–0)
PLATELET # BLD AUTO: 180 K/UL — SIGNIFICANT CHANGE UP (ref 150–400)
POTASSIUM SERPL-SCNC: 5 MMOL/L
PROT SERPL-MCNC: 7 G/DL
PSA SERPL-MCNC: 2.12 NG/ML
RBC # BLD: 4.54 M/UL — SIGNIFICANT CHANGE UP (ref 4.2–5.8)
RBC # FLD: 12.4 % — SIGNIFICANT CHANGE UP (ref 10.3–14.5)
SODIUM SERPL-SCNC: 143 MMOL/L
WBC # BLD: 5.4 K/UL — SIGNIFICANT CHANGE UP (ref 3.8–10.5)
WBC # FLD AUTO: 5.4 K/UL — SIGNIFICANT CHANGE UP (ref 3.8–10.5)

## 2025-02-10 PROCEDURE — 99214 OFFICE O/P EST MOD 30 MIN: CPT

## 2025-03-19 ENCOUNTER — APPOINTMENT (OUTPATIENT)
Dept: INTERNAL MEDICINE | Facility: CLINIC | Age: 70
End: 2025-03-19
Payer: MEDICARE

## 2025-03-19 ENCOUNTER — NON-APPOINTMENT (OUTPATIENT)
Age: 70
End: 2025-03-19

## 2025-03-19 VITALS
HEIGHT: 71 IN | SYSTOLIC BLOOD PRESSURE: 137 MMHG | OXYGEN SATURATION: 97 % | TEMPERATURE: 97.9 F | HEART RATE: 54 BPM | DIASTOLIC BLOOD PRESSURE: 80 MMHG | WEIGHT: 220 LBS | BODY MASS INDEX: 30.8 KG/M2

## 2025-03-19 DIAGNOSIS — Z00.00 ENCOUNTER FOR GENERAL ADULT MEDICAL EXAMINATION W/OUT ABNORMAL FINDINGS: ICD-10-CM

## 2025-03-19 DIAGNOSIS — I49.49 OTHER PREMATURE DEPOLARIZATION: ICD-10-CM

## 2025-03-19 PROCEDURE — 36415 COLL VENOUS BLD VENIPUNCTURE: CPT

## 2025-03-19 PROCEDURE — G0439: CPT

## 2025-03-27 LAB
25(OH)D3 SERPL-MCNC: 33.6 NG/ML
ALBUMIN SERPL ELPH-MCNC: 4.2 G/DL
ALP BLD-CCNC: 76 U/L
ALT SERPL-CCNC: 16 U/L
ANION GAP SERPL CALC-SCNC: 13 MMOL/L
APPEARANCE: CLEAR
AST SERPL-CCNC: 21 U/L
BASOPHILS # BLD AUTO: 0.03 K/UL
BASOPHILS NFR BLD AUTO: 0.7 %
BILIRUB SERPL-MCNC: 0.4 MG/DL
BILIRUBIN URINE: NEGATIVE
BLOOD URINE: NEGATIVE
BUN SERPL-MCNC: 22 MG/DL
CALCIUM SERPL-MCNC: 9.5 MG/DL
CHLORIDE SERPL-SCNC: 106 MMOL/L
CHOLEST SERPL-MCNC: 161 MG/DL
CO2 SERPL-SCNC: 23 MMOL/L
COLOR: YELLOW
CREAT SERPL-MCNC: 1.19 MG/DL
EGFRCR SERPLBLD CKD-EPI 2021: 66 ML/MIN/1.73M2
EOSINOPHIL # BLD AUTO: 0.09 K/UL
EOSINOPHIL NFR BLD AUTO: 2 %
ESTIMATED AVERAGE GLUCOSE: 143 MG/DL
GLUCOSE QUALITATIVE U: NEGATIVE MG/DL
GLUCOSE SERPL-MCNC: 121 MG/DL
HBA1C MFR BLD HPLC: 6.6 %
HCT VFR BLD CALC: 41.4 %
HDLC SERPL-MCNC: 40 MG/DL
HGB BLD-MCNC: 13.8 G/DL
IMM GRANULOCYTES NFR BLD AUTO: 0.2 %
KETONES URINE: NEGATIVE MG/DL
LDLC SERPL-MCNC: 96 MG/DL
LEUKOCYTE ESTERASE URINE: NEGATIVE
LYMPHOCYTES # BLD AUTO: 1.31 K/UL
LYMPHOCYTES NFR BLD AUTO: 29.7 %
MAN DIFF?: NORMAL
MCHC RBC-ENTMCNC: 30.9 PG
MCHC RBC-ENTMCNC: 33.3 G/DL
MCV RBC AUTO: 92.6 FL
MONOCYTES # BLD AUTO: 0.55 K/UL
MONOCYTES NFR BLD AUTO: 12.5 %
NEUTROPHILS # BLD AUTO: 2.42 K/UL
NEUTROPHILS NFR BLD AUTO: 54.9 %
NITRITE URINE: NEGATIVE
NONHDLC SERPL-MCNC: 121 MG/DL
PH URINE: 5.5
PLATELET # BLD AUTO: 181 K/UL
POTASSIUM SERPL-SCNC: 4.4 MMOL/L
PROT SERPL-MCNC: 6.9 G/DL
PROTEIN URINE: NEGATIVE MG/DL
PSA FREE FLD-MCNC: 13 %
PSA FREE SERPL-MCNC: 0.42 NG/ML
PSA SERPL-MCNC: 3.21 NG/ML
RBC # BLD: 4.47 M/UL
RBC # FLD: 12.5 %
SODIUM SERPL-SCNC: 142 MMOL/L
SPECIFIC GRAVITY URINE: 1.02
TRIGL SERPL-MCNC: 142 MG/DL
TSH SERPL-ACNC: 4.09 UIU/ML
UROBILINOGEN URINE: 0.2 MG/DL
VIT B12 SERPL-MCNC: 634 PG/ML
WBC # FLD AUTO: 4.41 K/UL

## 2025-04-01 ENCOUNTER — APPOINTMENT (OUTPATIENT)
Dept: RADIATION ONCOLOGY | Facility: CLINIC | Age: 70
End: 2025-04-01
Payer: MEDICARE

## 2025-04-01 VITALS
HEIGHT: 71 IN | HEART RATE: 53 BPM | SYSTOLIC BLOOD PRESSURE: 115 MMHG | TEMPERATURE: 97 F | WEIGHT: 225.53 LBS | BODY MASS INDEX: 31.57 KG/M2 | OXYGEN SATURATION: 95 % | RESPIRATION RATE: 16 BRPM | DIASTOLIC BLOOD PRESSURE: 72 MMHG

## 2025-04-01 PROCEDURE — 99214 OFFICE O/P EST MOD 30 MIN: CPT

## 2025-04-03 ENCOUNTER — NON-APPOINTMENT (OUTPATIENT)
Age: 70
End: 2025-04-03

## 2025-04-03 ENCOUNTER — APPOINTMENT (OUTPATIENT)
Dept: CARDIOLOGY | Facility: CLINIC | Age: 70
End: 2025-04-03
Payer: MEDICARE

## 2025-04-03 VITALS
DIASTOLIC BLOOD PRESSURE: 70 MMHG | HEART RATE: 69 BPM | OXYGEN SATURATION: 96 % | SYSTOLIC BLOOD PRESSURE: 110 MMHG | BODY MASS INDEX: 30.41 KG/M2 | WEIGHT: 218 LBS

## 2025-04-03 DIAGNOSIS — E78.5 HYPERLIPIDEMIA, UNSPECIFIED: ICD-10-CM

## 2025-04-03 DIAGNOSIS — I49.49 OTHER PREMATURE DEPOLARIZATION: ICD-10-CM

## 2025-04-03 DIAGNOSIS — R94.31 ABNORMAL ELECTROCARDIOGRAM [ECG] [EKG]: ICD-10-CM

## 2025-04-03 DIAGNOSIS — I10 ESSENTIAL (PRIMARY) HYPERTENSION: ICD-10-CM

## 2025-04-03 PROCEDURE — 93224 XTRNL ECG REC UP TO 48 HRS: CPT

## 2025-04-03 PROCEDURE — 99205 OFFICE O/P NEW HI 60 MIN: CPT

## 2025-04-03 PROCEDURE — G2211 COMPLEX E/M VISIT ADD ON: CPT

## 2025-04-03 PROCEDURE — 93000 ELECTROCARDIOGRAM COMPLETE: CPT

## 2025-04-03 PROCEDURE — G0537: CPT

## 2025-04-16 ENCOUNTER — APPOINTMENT (OUTPATIENT)
Dept: CARDIOLOGY | Facility: CLINIC | Age: 70
End: 2025-04-16
Payer: MEDICARE

## 2025-04-16 PROCEDURE — 93306 TTE W/DOPPLER COMPLETE: CPT

## 2025-04-19 ENCOUNTER — RX RENEWAL (OUTPATIENT)
Age: 70
End: 2025-04-19

## 2025-05-02 ENCOUNTER — OUTPATIENT (OUTPATIENT)
Dept: OUTPATIENT SERVICES | Facility: HOSPITAL | Age: 70
LOS: 1 days | Discharge: HOME | End: 2025-05-02

## 2025-05-02 DIAGNOSIS — C61 MALIGNANT NEOPLASM OF PROSTATE: ICD-10-CM

## 2025-05-02 DIAGNOSIS — K40.90 UNILATERAL INGUINAL HERNIA, WITHOUT OBSTRUCTION OR GANGRENE, NOT SPECIFIED AS RECURRENT: Chronic | ICD-10-CM

## 2025-05-12 ENCOUNTER — RESULT REVIEW (OUTPATIENT)
Age: 70
End: 2025-05-12

## 2025-05-12 ENCOUNTER — APPOINTMENT (OUTPATIENT)
Dept: HEMATOLOGY ONCOLOGY | Facility: CLINIC | Age: 70
End: 2025-05-12
Payer: MEDICARE

## 2025-05-12 ENCOUNTER — APPOINTMENT (OUTPATIENT)
Dept: INFUSION THERAPY | Facility: HOSPITAL | Age: 70
End: 2025-05-12

## 2025-05-12 VITALS
OXYGEN SATURATION: 98 % | RESPIRATION RATE: 16 BRPM | DIASTOLIC BLOOD PRESSURE: 82 MMHG | BODY MASS INDEX: 30.41 KG/M2 | SYSTOLIC BLOOD PRESSURE: 138 MMHG | HEART RATE: 55 BPM | TEMPERATURE: 97 F | WEIGHT: 217.99 LBS

## 2025-05-12 DIAGNOSIS — C61 MALIGNANT NEOPLASM OF PROSTATE: ICD-10-CM

## 2025-05-12 LAB
ALBUMIN SERPL ELPH-MCNC: 4.2 G/DL
ALP BLD-CCNC: 71 U/L
ALT SERPL-CCNC: 21 U/L
ANION GAP SERPL CALC-SCNC: 12 MMOL/L
AST SERPL-CCNC: 21 U/L
BASOPHILS # BLD AUTO: 0.02 K/UL — SIGNIFICANT CHANGE UP (ref 0–0.2)
BASOPHILS NFR BLD AUTO: 0.4 % — SIGNIFICANT CHANGE UP (ref 0–2)
BILIRUB SERPL-MCNC: 0.5 MG/DL
BUN SERPL-MCNC: 21 MG/DL
CALCIUM SERPL-MCNC: 9.6 MG/DL
CHLORIDE SERPL-SCNC: 106 MMOL/L
CO2 SERPL-SCNC: 24 MMOL/L
CREAT SERPL-MCNC: 1.32 MG/DL
EGFRCR SERPLBLD CKD-EPI 2021: 58 ML/MIN/1.73M2
EOSINOPHIL # BLD AUTO: 0.1 K/UL — SIGNIFICANT CHANGE UP (ref 0–0.5)
EOSINOPHIL NFR BLD AUTO: 1.8 % — SIGNIFICANT CHANGE UP (ref 0–6)
GLUCOSE SERPL-MCNC: 112 MG/DL
HCT VFR BLD CALC: 41.9 % — SIGNIFICANT CHANGE UP (ref 39–50)
HGB BLD-MCNC: 14 G/DL — SIGNIFICANT CHANGE UP (ref 13–17)
IMM GRANULOCYTES NFR BLD AUTO: 0.4 % — SIGNIFICANT CHANGE UP (ref 0–0.9)
LYMPHOCYTES # BLD AUTO: 1.83 K/UL — SIGNIFICANT CHANGE UP (ref 1–3.3)
LYMPHOCYTES # BLD AUTO: 33.7 % — SIGNIFICANT CHANGE UP (ref 13–44)
MCHC RBC-ENTMCNC: 31.5 PG — SIGNIFICANT CHANGE UP (ref 27–34)
MCHC RBC-ENTMCNC: 33.4 G/DL — SIGNIFICANT CHANGE UP (ref 32–36)
MCV RBC AUTO: 94.2 FL — SIGNIFICANT CHANGE UP (ref 80–100)
MONOCYTES # BLD AUTO: 0.71 K/UL — SIGNIFICANT CHANGE UP (ref 0–0.9)
MONOCYTES NFR BLD AUTO: 13.1 % — SIGNIFICANT CHANGE UP (ref 2–14)
NEUTROPHILS # BLD AUTO: 2.75 K/UL — SIGNIFICANT CHANGE UP (ref 1.8–7.4)
NEUTROPHILS NFR BLD AUTO: 50.6 % — SIGNIFICANT CHANGE UP (ref 43–77)
NRBC BLD AUTO-RTO: 0 /100 WBCS — SIGNIFICANT CHANGE UP (ref 0–0)
PLATELET # BLD AUTO: 180 K/UL — SIGNIFICANT CHANGE UP (ref 150–400)
POTASSIUM SERPL-SCNC: 5.1 MMOL/L
PROT SERPL-MCNC: 6.9 G/DL
PSA SERPL-MCNC: 5.23 NG/ML
RBC # BLD: 4.45 M/UL — SIGNIFICANT CHANGE UP (ref 4.2–5.8)
RBC # FLD: 12.3 % — SIGNIFICANT CHANGE UP (ref 10.3–14.5)
SODIUM SERPL-SCNC: 143 MMOL/L
TESTOST SERPL-MCNC: 7.3 NG/DL
WBC # BLD: 5.43 K/UL — SIGNIFICANT CHANGE UP (ref 3.8–10.5)
WBC # FLD AUTO: 5.43 K/UL — SIGNIFICANT CHANGE UP (ref 3.8–10.5)

## 2025-05-12 PROCEDURE — 99214 OFFICE O/P EST MOD 30 MIN: CPT

## 2025-05-19 ENCOUNTER — TRANSCRIPTION ENCOUNTER (OUTPATIENT)
Age: 70
End: 2025-05-19

## 2025-05-28 ENCOUNTER — OUTPATIENT (OUTPATIENT)
Dept: OUTPATIENT SERVICES | Facility: HOSPITAL | Age: 70
LOS: 1 days | End: 2025-05-28
Payer: MEDICARE

## 2025-05-28 ENCOUNTER — APPOINTMENT (OUTPATIENT)
Dept: NUCLEAR MEDICINE | Facility: IMAGING CENTER | Age: 70
End: 2025-05-28
Payer: MEDICARE

## 2025-05-28 DIAGNOSIS — K40.90 UNILATERAL INGUINAL HERNIA, WITHOUT OBSTRUCTION OR GANGRENE, NOT SPECIFIED AS RECURRENT: Chronic | ICD-10-CM

## 2025-05-28 DIAGNOSIS — C61 MALIGNANT NEOPLASM OF PROSTATE: ICD-10-CM

## 2025-05-28 PROCEDURE — 78816 PET IMAGE W/CT FULL BODY: CPT | Mod: 26,KX

## 2025-05-28 PROCEDURE — 78816 PET IMAGE W/CT FULL BODY: CPT

## 2025-05-28 PROCEDURE — A9608: CPT

## 2025-06-04 DIAGNOSIS — C61 MALIGNANT NEOPLASM OF PROSTATE: ICD-10-CM

## 2025-06-09 ENCOUNTER — APPOINTMENT (OUTPATIENT)
Dept: HEMATOLOGY ONCOLOGY | Facility: CLINIC | Age: 70
End: 2025-06-09

## 2025-06-09 VITALS
WEIGHT: 216.93 LBS | RESPIRATION RATE: 16 BRPM | TEMPERATURE: 98 F | BODY MASS INDEX: 30.26 KG/M2 | SYSTOLIC BLOOD PRESSURE: 116 MMHG | OXYGEN SATURATION: 98 % | DIASTOLIC BLOOD PRESSURE: 76 MMHG | HEART RATE: 62 BPM

## 2025-06-09 PROCEDURE — 99214 OFFICE O/P EST MOD 30 MIN: CPT

## 2025-06-16 RX ORDER — OLAPARIB 150 MG/1
150 TABLET, FILM COATED ORAL
Qty: 120 | Refills: 2 | Status: ACTIVE | COMMUNITY
Start: 2025-06-16 | End: 1900-01-01

## 2025-06-16 RX ORDER — ABIRATERONE ACETATE 250 MG/1
250 TABLET, FILM COATED ORAL
Qty: 120 | Refills: 11 | Status: ACTIVE | COMMUNITY
Start: 2025-06-16 | End: 1900-01-01

## 2025-06-16 RX ORDER — PREDNISONE 5 MG/1
5 TABLET ORAL DAILY
Qty: 1 | Refills: 3 | Status: ACTIVE | COMMUNITY
Start: 2025-06-16 | End: 1900-01-01

## 2025-06-17 ENCOUNTER — TRANSCRIPTION ENCOUNTER (OUTPATIENT)
Age: 70
End: 2025-06-17

## 2025-06-18 ENCOUNTER — RX RENEWAL (OUTPATIENT)
Age: 70
End: 2025-06-18

## 2025-06-19 ENCOUNTER — NON-APPOINTMENT (OUTPATIENT)
Age: 70
End: 2025-06-19

## 2025-07-10 ENCOUNTER — RX RENEWAL (OUTPATIENT)
Age: 70
End: 2025-07-10

## 2025-07-12 ENCOUNTER — OUTPATIENT (OUTPATIENT)
Dept: OUTPATIENT SERVICES | Facility: HOSPITAL | Age: 70
LOS: 1 days | Discharge: HOME | End: 2025-07-12

## 2025-07-12 ENCOUNTER — NON-APPOINTMENT (OUTPATIENT)
Age: 70
End: 2025-07-12

## 2025-07-12 DIAGNOSIS — C61 MALIGNANT NEOPLASM OF PROSTATE: ICD-10-CM

## 2025-07-12 DIAGNOSIS — K40.90 UNILATERAL INGUINAL HERNIA, WITHOUT OBSTRUCTION OR GANGRENE, NOT SPECIFIED AS RECURRENT: Chronic | ICD-10-CM

## 2025-07-14 ENCOUNTER — RESULT REVIEW (OUTPATIENT)
Age: 70
End: 2025-07-14

## 2025-07-14 ENCOUNTER — APPOINTMENT (OUTPATIENT)
Dept: HEMATOLOGY ONCOLOGY | Facility: CLINIC | Age: 70
End: 2025-07-14

## 2025-07-14 LAB
BASOPHILS # BLD AUTO: 0.02 K/UL — SIGNIFICANT CHANGE UP (ref 0–0.2)
BASOPHILS NFR BLD AUTO: 0.4 % — SIGNIFICANT CHANGE UP (ref 0–2)
EOSINOPHIL # BLD AUTO: 0.12 K/UL — SIGNIFICANT CHANGE UP (ref 0–0.5)
EOSINOPHIL NFR BLD AUTO: 2.4 % — SIGNIFICANT CHANGE UP (ref 0–6)
HCT VFR BLD CALC: 42.3 % — SIGNIFICANT CHANGE UP (ref 39–50)
HGB BLD-MCNC: 14 G/DL — SIGNIFICANT CHANGE UP (ref 13–17)
IMM GRANULOCYTES NFR BLD AUTO: 0.2 % — SIGNIFICANT CHANGE UP (ref 0–0.9)
LYMPHOCYTES # BLD AUTO: 1.78 K/UL — SIGNIFICANT CHANGE UP (ref 1–3.3)
LYMPHOCYTES # BLD AUTO: 36.1 % — SIGNIFICANT CHANGE UP (ref 13–44)
MCHC RBC-ENTMCNC: 31.3 PG — SIGNIFICANT CHANGE UP (ref 27–34)
MCHC RBC-ENTMCNC: 33.1 G/DL — SIGNIFICANT CHANGE UP (ref 32–36)
MCV RBC AUTO: 94.6 FL — SIGNIFICANT CHANGE UP (ref 80–100)
MONOCYTES # BLD AUTO: 0.46 K/UL — SIGNIFICANT CHANGE UP (ref 0–0.9)
MONOCYTES NFR BLD AUTO: 9.3 % — SIGNIFICANT CHANGE UP (ref 2–14)
NEUTROPHILS # BLD AUTO: 2.54 K/UL — SIGNIFICANT CHANGE UP (ref 1.8–7.4)
NEUTROPHILS NFR BLD AUTO: 51.6 % — SIGNIFICANT CHANGE UP (ref 43–77)
NRBC BLD AUTO-RTO: 0 /100 WBCS — SIGNIFICANT CHANGE UP (ref 0–0)
PLATELET # BLD AUTO: 176 K/UL — SIGNIFICANT CHANGE UP (ref 150–400)
RBC # BLD: 4.47 M/UL — SIGNIFICANT CHANGE UP (ref 4.2–5.8)
RBC # FLD: 12.2 % — SIGNIFICANT CHANGE UP (ref 10.3–14.5)
WBC # BLD: 4.93 K/UL — SIGNIFICANT CHANGE UP (ref 3.8–10.5)
WBC # FLD AUTO: 4.93 K/UL — SIGNIFICANT CHANGE UP (ref 3.8–10.5)

## 2025-07-21 ENCOUNTER — APPOINTMENT (OUTPATIENT)
Dept: HEMATOLOGY ONCOLOGY | Facility: CLINIC | Age: 70
End: 2025-07-21
Payer: MEDICARE

## 2025-07-21 ENCOUNTER — RESULT REVIEW (OUTPATIENT)
Age: 70
End: 2025-07-21

## 2025-07-21 VITALS
SYSTOLIC BLOOD PRESSURE: 140 MMHG | TEMPERATURE: 97.2 F | RESPIRATION RATE: 16 BRPM | OXYGEN SATURATION: 98 % | HEART RATE: 50 BPM | WEIGHT: 223.99 LBS | DIASTOLIC BLOOD PRESSURE: 68 MMHG | BODY MASS INDEX: 31.24 KG/M2

## 2025-07-21 LAB
ALBUMIN SERPL ELPH-MCNC: 3.8 G/DL
ALP BLD-CCNC: 76 U/L
ALT SERPL-CCNC: 19 U/L
ANION GAP SERPL CALC-SCNC: 11 MMOL/L
AST SERPL-CCNC: 16 U/L
BASOPHILS # BLD AUTO: 0.03 K/UL — SIGNIFICANT CHANGE UP (ref 0–0.2)
BASOPHILS NFR BLD AUTO: 0.5 % — SIGNIFICANT CHANGE UP (ref 0–2)
BILIRUB SERPL-MCNC: 0.5 MG/DL
BUN SERPL-MCNC: 26 MG/DL
CALCIUM SERPL-MCNC: 9.3 MG/DL
CHLORIDE SERPL-SCNC: 109 MMOL/L
CO2 SERPL-SCNC: 24 MMOL/L
CREAT SERPL-MCNC: 1.41 MG/DL
EGFRCR SERPLBLD CKD-EPI 2021: 53 ML/MIN/1.73M2
EOSINOPHIL # BLD AUTO: 0.09 K/UL — SIGNIFICANT CHANGE UP (ref 0–0.5)
EOSINOPHIL NFR BLD AUTO: 1.6 % — SIGNIFICANT CHANGE UP (ref 0–6)
GLUCOSE SERPL-MCNC: 100 MG/DL
HCT VFR BLD CALC: 39.5 % — SIGNIFICANT CHANGE UP (ref 39–50)
HGB BLD-MCNC: 13.2 G/DL — SIGNIFICANT CHANGE UP (ref 13–17)
IMM GRANULOCYTES NFR BLD AUTO: 0.5 % — SIGNIFICANT CHANGE UP (ref 0–0.9)
LYMPHOCYTES # BLD AUTO: 1.69 K/UL — SIGNIFICANT CHANGE UP (ref 1–3.3)
LYMPHOCYTES # BLD AUTO: 29.8 % — SIGNIFICANT CHANGE UP (ref 13–44)
MCHC RBC-ENTMCNC: 31.7 PG — SIGNIFICANT CHANGE UP (ref 27–34)
MCHC RBC-ENTMCNC: 33.4 G/DL — SIGNIFICANT CHANGE UP (ref 32–36)
MCV RBC AUTO: 95 FL — SIGNIFICANT CHANGE UP (ref 80–100)
MONOCYTES # BLD AUTO: 0.42 K/UL — SIGNIFICANT CHANGE UP (ref 0–0.9)
MONOCYTES NFR BLD AUTO: 7.4 % — SIGNIFICANT CHANGE UP (ref 2–14)
NEUTROPHILS # BLD AUTO: 3.41 K/UL — SIGNIFICANT CHANGE UP (ref 1.8–7.4)
NEUTROPHILS NFR BLD AUTO: 60.2 % — SIGNIFICANT CHANGE UP (ref 43–77)
NRBC BLD AUTO-RTO: 0 /100 WBCS — SIGNIFICANT CHANGE UP (ref 0–0)
PLATELET # BLD AUTO: 168 K/UL — SIGNIFICANT CHANGE UP (ref 150–400)
POTASSIUM SERPL-SCNC: 5.1 MMOL/L
PROT SERPL-MCNC: 6.5 G/DL
PSA SERPL-MCNC: 0.7 NG/ML
RBC # BLD: 4.16 M/UL — LOW (ref 4.2–5.8)
RBC # FLD: 12.2 % — SIGNIFICANT CHANGE UP (ref 10.3–14.5)
SODIUM SERPL-SCNC: 144 MMOL/L
TESTOST SERPL-MCNC: <2.5 NG/DL
WBC # BLD: 5.67 K/UL — SIGNIFICANT CHANGE UP (ref 3.8–10.5)
WBC # FLD AUTO: 5.67 K/UL — SIGNIFICANT CHANGE UP (ref 3.8–10.5)

## 2025-07-21 PROCEDURE — 99214 OFFICE O/P EST MOD 30 MIN: CPT

## 2025-07-28 ENCOUNTER — RESULT REVIEW (OUTPATIENT)
Age: 70
End: 2025-07-28

## 2025-07-28 ENCOUNTER — APPOINTMENT (OUTPATIENT)
Dept: HEMATOLOGY ONCOLOGY | Facility: CLINIC | Age: 70
End: 2025-07-28

## 2025-07-28 LAB
ALBUMIN SERPL ELPH-MCNC: 4 G/DL
ALP BLD-CCNC: 77 U/L
ALT SERPL-CCNC: 19 U/L
ANION GAP SERPL CALC-SCNC: 13 MMOL/L
AST SERPL-CCNC: 17 U/L
BASOPHILS # BLD AUTO: 0.02 K/UL — SIGNIFICANT CHANGE UP (ref 0–0.2)
BASOPHILS NFR BLD AUTO: 0.4 % — SIGNIFICANT CHANGE UP (ref 0–2)
BILIRUB SERPL-MCNC: 0.6 MG/DL
BUN SERPL-MCNC: 33 MG/DL
CALCIUM SERPL-MCNC: 9.6 MG/DL
CHLORIDE SERPL-SCNC: 105 MMOL/L
CO2 SERPL-SCNC: 23 MMOL/L
CREAT SERPL-MCNC: 1.47 MG/DL
EGFRCR SERPLBLD CKD-EPI 2021: 51 ML/MIN/1.73M2
EOSINOPHIL # BLD AUTO: 0.08 K/UL — SIGNIFICANT CHANGE UP (ref 0–0.5)
EOSINOPHIL NFR BLD AUTO: 1.5 % — SIGNIFICANT CHANGE UP (ref 0–6)
GLUCOSE SERPL-MCNC: 102 MG/DL
HCT VFR BLD CALC: 39.3 % — SIGNIFICANT CHANGE UP (ref 39–50)
HGB BLD-MCNC: 13.2 G/DL — SIGNIFICANT CHANGE UP (ref 13–17)
IMM GRANULOCYTES NFR BLD AUTO: 0.4 % — SIGNIFICANT CHANGE UP (ref 0–0.9)
LYMPHOCYTES # BLD AUTO: 1.75 K/UL — SIGNIFICANT CHANGE UP (ref 1–3.3)
LYMPHOCYTES # BLD AUTO: 33.4 % — SIGNIFICANT CHANGE UP (ref 13–44)
MCHC RBC-ENTMCNC: 31.6 PG — SIGNIFICANT CHANGE UP (ref 27–34)
MCHC RBC-ENTMCNC: 33.6 G/DL — SIGNIFICANT CHANGE UP (ref 32–36)
MCV RBC AUTO: 94 FL — SIGNIFICANT CHANGE UP (ref 80–100)
MONOCYTES # BLD AUTO: 0.56 K/UL — SIGNIFICANT CHANGE UP (ref 0–0.9)
MONOCYTES NFR BLD AUTO: 10.7 % — SIGNIFICANT CHANGE UP (ref 2–14)
NEUTROPHILS # BLD AUTO: 2.81 K/UL — SIGNIFICANT CHANGE UP (ref 1.8–7.4)
NEUTROPHILS NFR BLD AUTO: 53.6 % — SIGNIFICANT CHANGE UP (ref 43–77)
NRBC BLD AUTO-RTO: 0 /100 WBCS — SIGNIFICANT CHANGE UP (ref 0–0)
PLATELET # BLD AUTO: 153 K/UL — SIGNIFICANT CHANGE UP (ref 150–400)
POTASSIUM SERPL-SCNC: 5.2 MMOL/L
PROT SERPL-MCNC: 6.8 G/DL
PSA SERPL-MCNC: 0.34 NG/ML
RBC # BLD: 4.18 M/UL — LOW (ref 4.2–5.8)
RBC # FLD: 12.8 % — SIGNIFICANT CHANGE UP (ref 10.3–14.5)
SODIUM SERPL-SCNC: 142 MMOL/L
TESTOST SERPL-MCNC: <2.5 NG/DL
WBC # BLD: 5.24 K/UL — SIGNIFICANT CHANGE UP (ref 3.8–10.5)
WBC # FLD AUTO: 5.24 K/UL — SIGNIFICANT CHANGE UP (ref 3.8–10.5)

## 2025-08-04 ENCOUNTER — APPOINTMENT (OUTPATIENT)
Dept: HEMATOLOGY ONCOLOGY | Facility: CLINIC | Age: 70
End: 2025-08-04

## 2025-08-11 ENCOUNTER — RESULT REVIEW (OUTPATIENT)
Age: 70
End: 2025-08-11

## 2025-08-11 ENCOUNTER — APPOINTMENT (OUTPATIENT)
Dept: INFUSION THERAPY | Facility: HOSPITAL | Age: 70
End: 2025-08-11

## 2025-08-11 ENCOUNTER — APPOINTMENT (OUTPATIENT)
Dept: HEMATOLOGY ONCOLOGY | Facility: CLINIC | Age: 70
End: 2025-08-11

## 2025-08-11 VITALS
OXYGEN SATURATION: 99 % | SYSTOLIC BLOOD PRESSURE: 133 MMHG | HEART RATE: 54 BPM | BODY MASS INDEX: 30.69 KG/M2 | RESPIRATION RATE: 16 BRPM | DIASTOLIC BLOOD PRESSURE: 67 MMHG | WEIGHT: 220.02 LBS | TEMPERATURE: 97.3 F

## 2025-08-11 DIAGNOSIS — C61 MALIGNANT NEOPLASM OF PROSTATE: ICD-10-CM

## 2025-08-11 LAB
BASOPHILS # BLD AUTO: 0.02 K/UL — SIGNIFICANT CHANGE UP (ref 0–0.2)
BASOPHILS NFR BLD AUTO: 0.3 % — SIGNIFICANT CHANGE UP (ref 0–2)
EOSINOPHIL # BLD AUTO: 0.08 K/UL — SIGNIFICANT CHANGE UP (ref 0–0.5)
EOSINOPHIL NFR BLD AUTO: 1.2 % — SIGNIFICANT CHANGE UP (ref 0–6)
HCT VFR BLD CALC: 37.6 % — LOW (ref 39–50)
HGB BLD-MCNC: 12.7 G/DL — LOW (ref 13–17)
IMM GRANULOCYTES NFR BLD AUTO: 0.6 % — SIGNIFICANT CHANGE UP (ref 0–0.9)
LYMPHOCYTES # BLD AUTO: 1.87 K/UL — SIGNIFICANT CHANGE UP (ref 1–3.3)
LYMPHOCYTES # BLD AUTO: 27.4 % — SIGNIFICANT CHANGE UP (ref 13–44)
MCHC RBC-ENTMCNC: 32.1 PG — SIGNIFICANT CHANGE UP (ref 27–34)
MCHC RBC-ENTMCNC: 33.8 G/DL — SIGNIFICANT CHANGE UP (ref 32–36)
MCV RBC AUTO: 94.9 FL — SIGNIFICANT CHANGE UP (ref 80–100)
MONOCYTES # BLD AUTO: 0.81 K/UL — SIGNIFICANT CHANGE UP (ref 0–0.9)
MONOCYTES NFR BLD AUTO: 11.9 % — SIGNIFICANT CHANGE UP (ref 2–14)
NEUTROPHILS # BLD AUTO: 4 K/UL — SIGNIFICANT CHANGE UP (ref 1.8–7.4)
NEUTROPHILS NFR BLD AUTO: 58.6 % — SIGNIFICANT CHANGE UP (ref 43–77)
NRBC BLD AUTO-RTO: 0 /100 WBCS — SIGNIFICANT CHANGE UP (ref 0–0)
PLATELET # BLD AUTO: 150 K/UL — SIGNIFICANT CHANGE UP (ref 150–400)
RBC # BLD: 3.96 M/UL — LOW (ref 4.2–5.8)
RBC # FLD: 13.8 % — SIGNIFICANT CHANGE UP (ref 10.3–14.5)
WBC # BLD: 6.82 K/UL — SIGNIFICANT CHANGE UP (ref 3.8–10.5)
WBC # FLD AUTO: 6.82 K/UL — SIGNIFICANT CHANGE UP (ref 3.8–10.5)

## 2025-08-11 PROCEDURE — 99214 OFFICE O/P EST MOD 30 MIN: CPT

## 2025-09-15 ENCOUNTER — RX RENEWAL (OUTPATIENT)
Age: 70
End: 2025-09-15